# Patient Record
Sex: FEMALE | Race: WHITE | Employment: FULL TIME | URBAN - METROPOLITAN AREA
[De-identification: names, ages, dates, MRNs, and addresses within clinical notes are randomized per-mention and may not be internally consistent; named-entity substitution may affect disease eponyms.]

---

## 2021-07-24 ENCOUNTER — TRANSFERRED RECORDS (OUTPATIENT)
Dept: HEALTH INFORMATION MANAGEMENT | Facility: CLINIC | Age: 41
End: 2021-07-24

## 2021-07-28 ENCOUNTER — DOCUMENTATION ONLY (OUTPATIENT)
Dept: ONCOLOGY | Facility: CLINIC | Age: 41
End: 2021-07-28

## 2021-07-28 ENCOUNTER — PATIENT OUTREACH (OUTPATIENT)
Dept: SURGERY | Facility: CLINIC | Age: 41
End: 2021-07-28

## 2021-07-28 DIAGNOSIS — C16.9 GASTRIC CANCER (H): Primary | ICD-10-CM

## 2021-07-28 NOTE — PROGRESS NOTES
Action    Action Taken 7/28/2021 2:55pm HUEY   I called [facility] [phone] option 4 - requested the CT 7/24/2021 ( image was done at the [facility] in [place]) - they will send the CT soon!     I called [facility] to have an MRI pushed from April Ph: [phone]     I called [facility] to request the MRI IMG - they will send it over soon!    3:14pm HUEY   I resolved IMG in PACS.

## 2021-07-28 NOTE — PROGRESS NOTES
New Patient Oncology Nurse Navigator Note     Referring provider: Dr. Yaneth Oliveira     Referring Clinic/Organization: [facility]      Referred to: Surgical Oncology      Requested provider (if applicable): Dr. Jose Cruz Waters    Referral Received: 07/28/21       Evaluation for : Metastatic gastric cancer      Clinical Assessment / Barriers to Care (Per Nurse):    None at this time.     Records Location:     [facility] Everywhere     Records Needed:     ABDOMINAL IMAGING (CT SCANS, MRI, US)  DATING BACK TO 2021      Additional testing needed prior to consult:     NONE AT THIS TIME    Referral updates and Plan:       Consult with Surgical Oncology   Medical Oncology Consult     07/28/2021 2:43 PM - called and spoke with patients  regarding appointment and plan for consults with medical oncology and surgery. Informed him that we are working on finalized appointments but also need to make sure she is discharged prior to visits as well. Per previous dsicussion patient was to be discharged on Thursday or Friday pending no issues arise prior to that. Informed him I would connect with him by end of the day tomorrow to finalized appointment details.     Called and spoke with patients  and informed him I was able to confirm an appointment with Dr. Hernandez on Friday 7/30 at 2 pm for a video consult. Informed him that we would also continue with plan for consult with Dr. Waters when he returns to the office. Informed him that we would likely need the MRI of the liver to further evaluate. He stated they are scheduled at [facility] next week and have imaging scheduled as well. Informed him we will wait until we see what imaging is completed at [facility] and be sure to get that information prior to the visit with Dr. Waters but proceed with establishing care with oncology ASAP with Dr. Hernandez. Informed him our team would confirm appointment ASAP. He agreed with plan and was very appreciative of information and coordinating of  patients care.     Wanda Osorio, RN, BSN   Surgical Oncology New Patient Nurse Navigator  [facility]  [phone]

## 2021-07-29 NOTE — TELEPHONE ENCOUNTER
RECORDS STATUS - ALL OTHER DIAGNOSIS      RECORDS RECEIVED FROM: [facility]   DATE RECEIVED:    NOTES STATUS DETAILS   OFFICE NOTE from referring provider [facility]    OFFICE NOTE from medical oncologist     DISCHARGE SUMMARY from hospital [facility] 7/24/21   DISCHARGE REPORT from the ER     OPERATIVE REPORT [facility] 7/24/21: Exploratory Laporotomy, EGD w/. Bx of Gastric Mass   MEDICATION LIST [facility]   CLINICAL TRIAL TREATMENTS TO DATE     LABS     PATHOLOGY REPORTS [facility], Report in CE, Slides received 8/3 7/24/21: [id number]   ANYTHING RELATED TO DIAGNOSIS Epic/ 7/29/21   GENONOMIC TESTING     TYPE: Received 8/3    IMAGING (NEED IMAGES & REPORT)     CT SCANS PACS 7/24/21: [facility]   MRI Pacs 4/19/21: [facility]   MAMMO     ULTRASOUND     PET

## 2021-07-30 ENCOUNTER — PATIENT OUTREACH (OUTPATIENT)
Dept: ONCOLOGY | Facility: CLINIC | Age: 41
End: 2021-07-30

## 2021-07-30 ENCOUNTER — VIRTUAL VISIT (OUTPATIENT)
Dept: ONCOLOGY | Facility: CLINIC | Age: 41
End: 2021-07-30
Attending: INTERNAL MEDICINE
Payer: COMMERCIAL

## 2021-07-30 ENCOUNTER — PRE VISIT (OUTPATIENT)
Dept: ONCOLOGY | Facility: CLINIC | Age: 41
End: 2021-07-30

## 2021-07-30 DIAGNOSIS — C78.7 LIVER METASTASES: ICD-10-CM

## 2021-07-30 DIAGNOSIS — C78.6 PERITONEAL METASTASES: ICD-10-CM

## 2021-07-30 DIAGNOSIS — C16.2 MALIGNANT NEOPLASM OF BODY OF STOMACH (H): ICD-10-CM

## 2021-07-30 PROBLEM — K65.0 PERITONITIS, ACUTE GENERALIZED (H): Status: ACTIVE | Noted: 2021-07-24

## 2021-07-30 PROBLEM — K65.0 PERITONITIS, ACUTE GENERALIZED (H): Status: RESOLVED | Noted: 2021-07-24 | Resolved: 2021-07-30

## 2021-07-30 PROCEDURE — 99417 PROLNG OP E/M EACH 15 MIN: CPT | Performed by: INTERNAL MEDICINE

## 2021-07-30 PROCEDURE — 99205 OFFICE O/P NEW HI 60 MIN: CPT | Mod: 95 | Performed by: INTERNAL MEDICINE

## 2021-07-30 NOTE — NURSING NOTE
Patient has medications to add to chart as well as would like to discuss making  a medical proxy.

## 2021-07-30 NOTE — PROGRESS NOTES
Shira is a 41 year old who is being evaluated via a billable video visit.      How would you like to obtain your AVS? MyChart  If the video visit is dropped, the invitation should be resent by: Text to cell phone: [phone]  Will anyone else be joining your video visit? Yes: . How would they like to receive their invitation? Text to cell phone:         Shira Joy is a new patient with newly diagnosed metastatic gastric cancer.    She is a 41-year-old woman who last week had the fairly acute onset of abdominal pain and shortness of breath that led to hospitalization for a perforated stomach.  She underwent an urgent laparotomy for repair of the perforation and was identified to have tumor in the lesser curvature of her stomach with mesenteric nodularity including a small nodule in the left pelvic sidewall which on biopsy proved to be poorly differentiated adenocarcinoma.  She tells me that in retrospect she probably had 2 to 4 weeks of fatigue and vague abdominal discomfort leading up to this, but otherwise has been asymptomatic.  Several months ago on routine physical exam she was identified to have a large pelvic mass and underwent evaluation and this was felt to represent fibroids for which she was scheduled for hysterectomy this fall.  She has recovered quite quickly from her surgery and is back home now.  She is still ramping up her diet but is eating fairly well.    She has a history of a number of cancers in her family.  Her father had a melanoma diagnosed at about the age of 70.  Her paternal grandmother had colon cancer in her mid 70s.  On her mother's side of her family her grandmother had breast cancer at about the age of 45.  3 maternal aunts and 1 maternal uncle all had lung cancer-all of them were heavy smokers.  She is unaware of any other cancers in more distant relatives.    Ms. Joy is  and lives in [place] with her  who accompanies her on today's visit.  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  She has a 19-year-old stepson and a 10 and 8-year-old with her .  She is mostly a non-smoker having smoked only a little bit, quitting more than 20 years ago.    GENERAL: Healthy, alert and no distress  EYES: Eyes grossly normal to inspection.  No discharge or erythema, or obvious scleral/conjunctival abnormalities.  RESP: No audible wheeze, cough, or visible cyanosis.  No visible retractions or increased work of breathing.    SKIN: Visible skin clear. No significant rash, abnormal pigmentation or lesions.  NEURO: Cranial nerves grossly intact.  Mentation and speech appropriate for age.  PSYCH: Mentation appears normal, affect normal/bright, judgement and insight intact, normal speech and appearance well-groomed.    I personally reviewed her outside CT scan done preoperatively and reviewed the images with the patient and her .  In addition to the ascites and free air she has marked thickening of the stomach wall and mesenteric nodularity.  There is a 2 cm lesion in the right lobe of her liver consistent with metastases.  She has large complex mass(es) associated with her uterus.  I do not see any abnormality in the visualized lung bases.  Her outside lab work shows a CEA level of 205.  Her labs at discharge show mild normocytic anemia and otherwise normal blood counts.  Her electrolytes and renal function were normal.  She had a depressed albumin and normal liver enzymes.    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  The bulky disease in her pelvis may well be uterine fibroids rather than metastatic disease, and if so she otherwise has relatively minimal peritoneal disease.  I'm fairly sure the liver lesion represents a metastasis but will need a little further evaluation to clarify that.    I had a long discussion with the patient and her   discussing the status of her disease, her prognosis and treatment options I explained the need to complete her staging to clarify the extent of her disease.  She clearly has stage IV disease but I would like PET scanning to clarify the nature of her liver lesion and completion CT to clarify the status of the rest of her lungs.  I explained that regardless she has disease that I would consider incurable.  If she truly has only small volume peritoneal disease and no other metastatic disease it would be within the realm of reasonable choices to resect her stomach and peritoneal disease with HIPEC, but I told her I did not think that was probably going to be the case.    We spent most of our time discussing the need for systemic therapy to control her disease with the intent to maintain her quality of life and prolong her survival.  She wanted to know about her prognosis and I explained that without therapy the expectation is time measured in months with few people making it past a year.  I explained that with our best therapies we can extend the average survival out past a year with some doing better for much longer but also with some not having much of a response to treatment.  She still has molecular profiling pending on her tumor which will help guide our treatment choices and so we discussed only in general terms what some of the options might be.  We discussed the overall strategy of starting systemic therapy, making adjustments for side effects, and then continuing therapy so long as it was effective without excess toxicity, with evaluations every couple of months for response.  We discussed that there would likely be several different lines of therapy that would be appropriate for her.    We discussed in general terms the availability of clinical trials, and I explained I do not currently have an open frontline treatment trial would be appropriate for her, but that we would certainly have some trial  opportunities with subsequent lines of therapy.  We talked about her young age and the possibility of a familial cancer syndrome and I recommended genetic counseling and testing.    At the end of our long discussion she and her  had all their immediate questions answered.  She still needs some recovery time from her recent acute event and surgery before we get started on treatment.  She has a visit for another opinion down at [facility] next week and they have already scheduled her for PET scanning, which is the only additional testing that I think we need (along with the pending molecular profile.)  We will plan on further discussion in about 10 days or so once we have the results of the additional testing and she has had a visit with surgical oncology.    Total time by me today inclusive of the above visit, review of her outside imaging and records, coordination of care and documentation was approximately 90 minutes.

## 2021-07-30 NOTE — PROGRESS NOTES
RN Care Coordination Note  Placed call to patient and . Introduced self and role of RN Care Coordinator at [facility]. Provided my contact information, [facility] phone number (which has options to talk with a Nurse available 24/7 - triage and RNCC via this option during business hours).     Reviewed current adjustments in clinic flow due to Covid-19 pandemic including addition of telemedicine visits, visitor restrictions, RNCCs working remotely at varying intervals, and Covid testing.    Reviewed [facility] care team members including midlevel providers in oncology dept and Dr. Hernandez's usual clinic hours.    Patient questions if she can/should have COVID-19 vaccine. Discussed we do recommend patients be vaccinated. Reviewed infection prevention strategies.     Pt voiced understanding and appreciation of above information and denies any further questions.         Norma Connell RN, BSN, OCN   RN Care Coordinator   [facility]

## 2021-07-30 NOTE — LETTER
7/30/2021         RE: Shira Joy  [address]  [place] [address]        Dear Colleague,    Thank you for referring your patient, Shira Joy, to the [facility]. Please see a copy of my visit note below.    Shira is a 41 year old who is being evaluated via a billable video visit.      How would you like to obtain your AVS? MyChart  If the video visit is dropped, the invitation should be resent by: Text to cell phone: [phone]  Will anyone else be joining your video visit? Yes: . How would they like to receive their invitation? Text to cell phone:         Shira Joy is a new patient with newly diagnosed metastatic gastric cancer.    She is a 41-year-old woman who last week had the fairly acute onset of abdominal pain and shortness of breath that led to hospitalization for a perforated stomach.  She underwent an urgent laparotomy for repair of the perforation and was identified to have tumor in the lesser curvature of her stomach with mesenteric nodularity including a small nodule in the left pelvic sidewall which on biopsy proved to be poorly differentiated adenocarcinoma.  She tells me that in retrospect she probably had 2 to 4 weeks of fatigue and vague abdominal discomfort leading up to this, but otherwise has been asymptomatic.  Several months ago on routine physical exam she was identified to have a large pelvic mass and undergone evaluation and this was felt to represent fibroids for which she was scheduled for hysterectomy this fall.  She has recovered quite quickly from her surgery and is back home now.  She is still ramping up her diet but is eating fairly well.    She has a history of a number of cancers in her family.  Her father had a melanoma diagnosed at about the age of 70.  Her paternal grandmother had colon cancer in her mid 70s.  On her mother's side of her family her grandmother had breast cancer at about the age of 45.  3 maternal aunts  and 1 maternal uncle all had lung cancer-all of them were heavy smokers.  She is unaware of any other cancers in more distant relatives.    Ms. Joy is  and lives in [place] with her  who accompanies her on today's visit.  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  She has a 19-year-old stepson and a 10 and 8-year-old with her .  She is mostly a non-smoker having smoked only a little bit, quitting more than 20 years ago.    GENERAL: Healthy, alert and no distress  EYES: Eyes grossly normal to inspection.  No discharge or erythema, or obvious scleral/conjunctival abnormalities.  RESP: No audible wheeze, cough, or visible cyanosis.  No visible retractions or increased work of breathing.    SKIN: Visible skin clear. No significant rash, abnormal pigmentation or lesions.  NEURO: Cranial nerves grossly intact.  Mentation and speech appropriate for age.  PSYCH: Mentation appears normal, affect normal/bright, judgement and insight intact, normal speech and appearance well-groomed.    I personally her outside CT scan done preoperatively and reviewed the images with the patient and her .  In addition to the ascites and free air she has marked thickening of the stomach wall and mesenteric nodularity.  There is a 2 cm lesion in the right lobe of her liver consistent with metastases.  She has large complex mass(es) associated with her uterus.  I do not see any abnormality in the visualized lung bases.  Her outside lab work shows a CEA level of 205.  Her labs at discharge show mild normocytic anemia and otherwise normal blood counts.  Her electrolytes and renal function were normal.  She had a depressed albumin and normal liver enzymes.    Assessment/plan: Adenocarcinoma the stomach with peritoneal and probably liver metastases in a young woman with a normal performance status.  The bulky disease in her pelvis may well be uterine fibroids rather than metastatic disease,  and if so she otherwise has relatively minimal peritoneal disease.  In fairly sure the liver lesion represents a metastasis but will need a little further evaluation to clarify that.    I had a long discussion with the patient and her  discussing the status of her disease her prognosis and treatment options I explained the need to complete her staging to clarify the extent of her disease.  She clearly has stage IV disease there would like PET scanning to clarify the nature of her liver lesion and completion CT to clarify the status of the rest of her lungs.  I explained that regardless she has disease that I would consider incurable.  If she truly has only small volume peritoneal disease and no other metastatic disease it would be within the realm of reasonable choices to resect her stomach and peritoneal disease with HIPEC, but I told her I did not think that was probably going to be the case.    We spent most of our time discussing the need for systemic therapy to control her disease with the intent to maintain her quality of life and prolong her survival.  She wanted to know about her prognosis and I explained that without therapy the expectation is time measured in months with few people making it past a year.  I explained that with her best therapies we can extend the average survival out past a year with some doing better for much longer but also with some not having much of a response to treatment.  She still has molecular profiling pending on her tumor which will help guide her treatment choices and so we discussed only in general terms what some of the options might be.  We discussed the overall strategy of starting systemic therapy, making adjustments for side effects, and then continuing therapy so long as it was effective without excess toxicity with evaluations every couple of months for response.  We discussed that there would likely be several different lines of therapy that would be  appropriate for her.    We discussed in general terms the availability of clinical trials, and I explained I do not currently have an open frontline treatment trial would be appropriate for but that we would certainly have some trial opportunities with subsequent lines of therapy.  We talked about her young age and the possibility of a familial cancer syndrome and I recommended genetic counseling and testing.    At the end of our long discussion she and her  had all their immediate questions answered.  She still need some recovery time from her recent acute event and surgery before we get started on treatment.  She has a visit for another opinion done at [facility] next week and they have already scheduled her for PET scanning, which is the only additional testing that I think we need (along with the pending molecular profile.)  We will plan on further discussion in about 10 days or so once we have the results of the additional testing and she has had a visit with surgical oncology.    Total time by me today inclusive of the above visit, review of her outside imaging and records, coordination of care and documentation was approximately 90 minutes.      Again, thank you for allowing me to participate in the care of your patient.        Sincerely,        Kevin Hernandez MD

## 2021-08-05 ENCOUNTER — LAB (OUTPATIENT)
Dept: LAB | Facility: CLINIC | Age: 41
End: 2021-08-05
Payer: COMMERCIAL

## 2021-08-05 ENCOUNTER — TRANSFERRED RECORDS (OUTPATIENT)
Dept: HEALTH INFORMATION MANAGEMENT | Facility: CLINIC | Age: 41
End: 2021-08-05

## 2021-08-05 DIAGNOSIS — C16.9 GASTRIC CANCER (H): Primary | ICD-10-CM

## 2021-08-05 LAB
PATH REPORT.COMMENTS IMP SPEC: ABNORMAL
PATH REPORT.COMMENTS IMP SPEC: YES
PATH REPORT.FINAL DX SPEC: ABNORMAL
PATH REPORT.GROSS SPEC: ABNORMAL
PATH REPORT.MICROSCOPIC SPEC OTHER STN: ABNORMAL
PATH REPORT.RELEVANT HX SPEC: ABNORMAL
PATH REPORT.RELEVANT HX SPEC: ABNORMAL
PATH REPORT.SITE OF ORIGIN SPEC: ABNORMAL

## 2021-08-05 PROCEDURE — 88321 CONSLTJ&REPRT SLD PREP ELSWR: CPT | Performed by: PATHOLOGY

## 2021-08-13 ENCOUNTER — ONCOLOGY VISIT (OUTPATIENT)
Dept: ONCOLOGY | Facility: CLINIC | Age: 41
End: 2021-08-13
Attending: SURGERY
Payer: COMMERCIAL

## 2021-08-13 VITALS
WEIGHT: 102.8 LBS | HEIGHT: 63 IN | BODY MASS INDEX: 18.21 KG/M2 | DIASTOLIC BLOOD PRESSURE: 81 MMHG | RESPIRATION RATE: 16 BRPM | OXYGEN SATURATION: 98 % | SYSTOLIC BLOOD PRESSURE: 113 MMHG | TEMPERATURE: 97.5 F | HEART RATE: 84 BPM

## 2021-08-13 DIAGNOSIS — C16.9 GASTRIC CANCER (H): ICD-10-CM

## 2021-08-13 PROCEDURE — 99205 OFFICE O/P NEW HI 60 MIN: CPT | Performed by: SURGERY

## 2021-08-13 PROCEDURE — G0463 HOSPITAL OUTPT CLINIC VISIT: HCPCS

## 2021-08-13 RX ORDER — OXYCODONE HYDROCHLORIDE 5 MG/1
TABLET ORAL
Status: ON HOLD | COMMUNITY
Start: 2021-08-06 | End: 2021-12-07

## 2021-08-13 RX ORDER — ONDANSETRON 4 MG/1
TABLET, ORALLY DISINTEGRATING ORAL
Status: ON HOLD | COMMUNITY
Start: 2021-07-29 | End: 2021-12-07

## 2021-08-13 RX ORDER — ACETAMINOPHEN 500 MG
1000 TABLET ORAL EVERY 6 HOURS PRN
Status: ON HOLD | COMMUNITY
Start: 2021-07-29 | End: 2021-12-18

## 2021-08-13 RX ORDER — PANTOPRAZOLE SODIUM 40 MG/1
40 TABLET, DELAYED RELEASE ORAL DAILY
Status: ON HOLD | COMMUNITY
Start: 2021-07-29 | End: 2021-12-18

## 2021-08-13 ASSESSMENT — PAIN SCALES - GENERAL: PAINLEVEL: NO PAIN (0)

## 2021-08-13 ASSESSMENT — MIFFLIN-ST. JEOR: SCORE: 1114.04

## 2021-08-13 NOTE — LETTER
8/13/2021         RE: Shira Joy  [address]  [place] [address]        Dear Colleague,    Thank you for referring your patient, Shira Joy, to the [facility]. Please see a copy of my visit note below.    HISTORY OF PRESENT ILLNESS:  Today, I had an in-person visit with Shira Joy.  I was asked to see her at the request of Dr. Yaneth Oliveira for evaluation of stage IV gastric cancer.  The patient was in her usual state of good health until she developed severe abdominal pain and free air.  She was taken to surgery in [place] where she was found to have a perforation in her stomach with advanced gastric cancer.  She had a nodule along her gastrocolic ligament that was removed.  She had a lesion in her right pericolic gutter that was removed and another omental nodule that was removed.  These were all positive for poorly differentiated adenocarcinoma.  She had a Frank patch closure of her stomach and then had endoscopy, which demonstrated a mass in her lesser curvature, which was also positive for metastatic disease.  She had a PET/CT scan, which demonstrated a thickened stomach, particularly on the lesser curvature aspect and evidence of peritoneal nodules.  She had a 1.8 cm right liver mass that was consistent with metastasis and a 6 cm right ovarian mass that was also consistent with metastasis.  She has recovered well from her surgery.  She is eating better, has more energy and appetite.    PAST MEDICAL HISTORY:  Significant for no other major medical problems.    FAMILY HISTORY:  Significant for a maternal uncle who had gastric cancer and a maternal grandmother who had breast cancer.  She is undergoing genetic testing evaluation at [facility].    IMPRESSION:  Stage IV gastric cancer.    PLAN:  I talked to her about the various treatment options.  I told her that surgery is not indicated now and probably never.  I have recommended that she proceed with  systemic chemotherapy.  She has a couple of other opinions.  She has another pending at [facility] and another pending at [facility].  She has seen Dr. Bhupinder Hernandez here in Medical Oncology as well.  If she requires any palliative surgical procedures in the future I will be glad to take care of her, but there is no role for cancer directed therapy for her situation.    Total time 60 minutes, which included reviewing her imaging and then an in-person consultation with her and her .          Again, thank you for allowing me to participate in the care of your patient.        Sincerely,        Jose Cruz Waters MD

## 2021-08-13 NOTE — NURSING NOTE
"Oncology Rooming Note    August 13, 2021 11:24 AM   Shira Joy is a 41 year old female who presents for:    Chief Complaint   Patient presents with     Oncology Clinic Visit     GASTRIC CANCER     Initial Vitals: /81 (BP Location: Right arm, Patient Position: Sitting, Cuff Size: Adult Regular)   Pulse 84   Temp 97.5  F (36.4  C) (Oral)   Resp 16   Ht 1.606 m (5' 3.23\")   Wt 46.6 kg (102 lb 12.8 oz)   SpO2 98%   BMI 18.08 kg/m   Estimated body mass index is 18.08 kg/m  as calculated from the following:    Height as of this encounter: 1.606 m (5' 3.23\").    Weight as of this encounter: 46.6 kg (102 lb 12.8 oz). Body surface area is 1.44 meters squared.  No Pain (0) Comment: Data Unavailable   No LMP recorded.  Allergies reviewed: Yes  Medications reviewed: Yes    Medications: Medication refills not needed today.  Pharmacy name entered into [employer]: [facility] #[id number] - [place], [place] - [address] AT [address]     Clinical concerns: Intermittent sharp abdominal pain.       Wendy Mcgovern [facility]              "

## 2021-08-13 NOTE — PROGRESS NOTES
HISTORY OF PRESENT ILLNESS:  Today, I had an in-person visit with Shira Joy.  I was asked to see her at the request of Dr. Yaneth Oliveira for evaluation of stage IV gastric cancer.  The patient was in her usual state of good health until she developed severe abdominal pain and free air.  She was taken to surgery in [place] where she was found to have a perforation in her stomach with advanced gastric cancer.  She had a nodule along her gastrocolic ligament that was removed.  She had a lesion in her right pericolic gutter that was removed and another omental nodule that was removed.  These were all positive for poorly differentiated adenocarcinoma.  She had a Frank patch closure of her stomach and then had endoscopy, which demonstrated a mass in her lesser curvature, which was also positive for metastatic disease.  She had a PET/CT scan, which demonstrated a thickened stomach, particularly on the lesser curvature aspect and evidence of peritoneal nodules.  She had a 1.8 cm right liver mass that was consistent with metastasis and a 6 cm right ovarian mass that was also consistent with metastasis.  She has recovered well from her surgery.  She is eating better, has more energy and appetite.    PAST MEDICAL HISTORY:  Significant for no other major medical problems.    FAMILY HISTORY:  Significant for a maternal uncle who had gastric cancer and a maternal grandmother who had breast cancer.  She is undergoing genetic testing evaluation at [facility].    IMPRESSION:  Stage IV gastric cancer.    PLAN:  I talked to her about the various treatment options.  I told her that surgery is not indicated now and probably never.  I have recommended that she proceed with systemic chemotherapy.  She has a couple of other opinions.  She has another pending at [facility] and another pending at [facility].  She has seen Dr. Bhupinder Hernandez here in [facility] as well.  If she requires any palliative surgical procedures in the  future I will be glad to take care of her, but there is no role for cancer directed therapy for her situation.    Total time 60 minutes, which included reviewing her imaging and then an in-person consultation with her and her .

## 2021-08-22 ENCOUNTER — HEALTH MAINTENANCE LETTER (OUTPATIENT)
Age: 41
End: 2021-08-22

## 2021-10-14 ENCOUNTER — TRANSFERRED RECORDS (OUTPATIENT)
Dept: HEALTH INFORMATION MANAGEMENT | Facility: CLINIC | Age: 41
End: 2021-10-14
Payer: COMMERCIAL

## 2021-10-17 ENCOUNTER — HEALTH MAINTENANCE LETTER (OUTPATIENT)
Age: 41
End: 2021-10-17

## 2021-12-06 ENCOUNTER — APPOINTMENT (OUTPATIENT)
Dept: GENERAL RADIOLOGY | Facility: CLINIC | Age: 41
End: 2021-12-06
Attending: EMERGENCY MEDICINE
Payer: COMMERCIAL

## 2021-12-06 ENCOUNTER — HOSPITAL ENCOUNTER (INPATIENT)
Facility: CLINIC | Age: 41
LOS: 3 days | Discharge: HOME | End: 2021-12-09
Attending: INTERNAL MEDICINE | Admitting: HOSPITALIST
Payer: COMMERCIAL

## 2021-12-06 ENCOUNTER — APPOINTMENT (OUTPATIENT)
Dept: CT IMAGING | Facility: CLINIC | Age: 41
End: 2021-12-06
Attending: INTERNAL MEDICINE
Payer: COMMERCIAL

## 2021-12-06 DIAGNOSIS — C78.6 SECONDARY MALIGNANT NEOPLASM OF RETROPERITONEUM AND PERITONEUM (H): ICD-10-CM

## 2021-12-06 DIAGNOSIS — C16.9 MALIGNANT NEOPLASM OF STOMACH, UNSPECIFIED LOCATION (H): ICD-10-CM

## 2021-12-06 DIAGNOSIS — E08.9 DIABETES MELLITUS DUE TO UNDERLYING CONDITION WITHOUT COMPLICATION, UNSPECIFIED WHETHER LONG TERM INSULIN USE (H): Primary | ICD-10-CM

## 2021-12-06 DIAGNOSIS — K56.609 SBO (SMALL BOWEL OBSTRUCTION) (H): ICD-10-CM

## 2021-12-06 DIAGNOSIS — C78.6 PERITONEAL CARCINOMATOSIS (H): ICD-10-CM

## 2021-12-06 DIAGNOSIS — K56.609 SMALL BOWEL OBSTRUCTION (H): ICD-10-CM

## 2021-12-06 DIAGNOSIS — Z20.822 CONTACT WITH AND (SUSPECTED) EXPOSURE TO COVID-19: ICD-10-CM

## 2021-12-06 DIAGNOSIS — C78.7 SECONDARY MALIGNANT NEOPLASM OF LIVER (H): ICD-10-CM

## 2021-12-06 DIAGNOSIS — C79.60 MALIGNANT NEOPLASM METASTATIC TO OVARY, UNSPECIFIED LATERALITY (H): ICD-10-CM

## 2021-12-06 DIAGNOSIS — R10.84 ABDOMINAL PAIN, GENERALIZED: ICD-10-CM

## 2021-12-06 DIAGNOSIS — C78.7 LIVER METASTASES: ICD-10-CM

## 2021-12-06 LAB
ABO/RH(D): NORMAL
ALBUMIN SERPL-MCNC: 3 G/DL (ref 3.4–5)
ALP SERPL-CCNC: 84 U/L (ref 40–150)
ALT SERPL W P-5'-P-CCNC: 65 U/L (ref 0–50)
ANION GAP SERPL CALCULATED.3IONS-SCNC: 8 MMOL/L (ref 3–14)
ANTIBODY SCREEN: NEGATIVE
AST SERPL W P-5'-P-CCNC: 28 U/L (ref 0–45)
BASOPHILS # BLD AUTO: 0 10E3/UL (ref 0–0.2)
BASOPHILS NFR BLD AUTO: 0 %
BILIRUB SERPL-MCNC: 0.3 MG/DL (ref 0.2–1.3)
BUN SERPL-MCNC: 11 MG/DL (ref 7–30)
CALCIUM SERPL-MCNC: 8.9 MG/DL (ref 8.5–10.1)
CHLORIDE BLD-SCNC: 102 MMOL/L (ref 94–109)
CO2 SERPL-SCNC: 26 MMOL/L (ref 20–32)
CREAT SERPL-MCNC: 0.47 MG/DL (ref 0.52–1.04)
EOSINOPHIL # BLD AUTO: 0.1 10E3/UL (ref 0–0.7)
EOSINOPHIL NFR BLD AUTO: 1 %
ERYTHROCYTE [DISTWIDTH] IN BLOOD BY AUTOMATED COUNT: 14.6 % (ref 10–15)
GFR SERPL CREATININE-BSD FRML MDRD: >90 ML/MIN/1.73M2
GLUCOSE BLD-MCNC: 167 MG/DL (ref 70–99)
HCO3 BLDV-SCNC: 25 MMOL/L (ref 21–28)
HCT VFR BLD AUTO: 32.5 % (ref 35–47)
HGB BLD-MCNC: 10.7 G/DL (ref 11.7–15.7)
HOLD SPECIMEN: NORMAL
IMM GRANULOCYTES # BLD: 0.1 10E3/UL
IMM GRANULOCYTES NFR BLD: 1 %
INR PPP: 1.04 (ref 0.85–1.15)
LACTATE BLD-SCNC: 1.3 MMOL/L
LIPASE SERPL-CCNC: 113 U/L (ref 73–393)
LYMPHOCYTES # BLD AUTO: 1.1 10E3/UL (ref 0.8–5.3)
LYMPHOCYTES NFR BLD AUTO: 13 %
MCH RBC QN AUTO: 29.3 PG (ref 26.5–33)
MCHC RBC AUTO-ENTMCNC: 32.9 G/DL (ref 31.5–36.5)
MCV RBC AUTO: 89 FL (ref 78–100)
MONOCYTES # BLD AUTO: 0.3 10E3/UL (ref 0–1.3)
MONOCYTES NFR BLD AUTO: 3 %
NEUTROPHILS # BLD AUTO: 7.1 10E3/UL (ref 1.6–8.3)
NEUTROPHILS NFR BLD AUTO: 82 %
NRBC # BLD AUTO: 0 10E3/UL
NRBC BLD AUTO-RTO: 0 /100
PCO2 BLDV: 37 MM HG (ref 40–50)
PH BLDV: 7.44 [PH] (ref 7.32–7.43)
PLATELET # BLD AUTO: 304 10E3/UL (ref 150–450)
PO2 BLDV: 34 MM HG (ref 25–47)
POTASSIUM BLD-SCNC: 3.6 MMOL/L (ref 3.4–5.3)
PROT SERPL-MCNC: 6.7 G/DL (ref 6.8–8.8)
RADIOLOGIST FLAGS: ABNORMAL
RBC # BLD AUTO: 3.65 10E6/UL (ref 3.8–5.2)
SAO2 % BLDV: 68 % (ref 94–100)
SARS-COV-2 RNA RESP QL NAA+PROBE: NEGATIVE
SODIUM SERPL-SCNC: 136 MMOL/L (ref 133–144)
SPECIMEN EXPIRATION DATE: NORMAL
WBC # BLD AUTO: 8.7 10E3/UL (ref 4–11)

## 2021-12-06 PROCEDURE — 250N000011 HC RX IP 250 OP 636: Performed by: INTERNAL MEDICINE

## 2021-12-06 PROCEDURE — 36415 COLL VENOUS BLD VENIPUNCTURE: CPT | Performed by: INTERNAL MEDICINE

## 2021-12-06 PROCEDURE — 74177 CT ABD & PELVIS W/CONTRAST: CPT

## 2021-12-06 PROCEDURE — 74177 CT ABD & PELVIS W/CONTRAST: CPT | Mod: 26 | Performed by: RADIOLOGY

## 2021-12-06 PROCEDURE — 86900 BLOOD TYPING SEROLOGIC ABO: CPT | Performed by: INTERNAL MEDICINE

## 2021-12-06 PROCEDURE — 250N000009 HC RX 250: Performed by: INTERNAL MEDICINE

## 2021-12-06 PROCEDURE — 74018 RADEX ABDOMEN 1 VIEW: CPT | Mod: 26 | Performed by: RADIOLOGY

## 2021-12-06 PROCEDURE — 96375 TX/PRO/DX INJ NEW DRUG ADDON: CPT | Performed by: INTERNAL MEDICINE

## 2021-12-06 PROCEDURE — 83690 ASSAY OF LIPASE: CPT | Performed by: EMERGENCY MEDICINE

## 2021-12-06 PROCEDURE — 85610 PROTHROMBIN TIME: CPT | Performed by: INTERNAL MEDICINE

## 2021-12-06 PROCEDURE — 96376 TX/PRO/DX INJ SAME DRUG ADON: CPT | Performed by: INTERNAL MEDICINE

## 2021-12-06 PROCEDURE — 96374 THER/PROPH/DIAG INJ IV PUSH: CPT | Mod: 59 | Performed by: INTERNAL MEDICINE

## 2021-12-06 PROCEDURE — 258N000003 HC RX IP 258 OP 636: Performed by: INTERNAL MEDICINE

## 2021-12-06 PROCEDURE — 93005 ELECTROCARDIOGRAM TRACING: CPT | Performed by: INTERNAL MEDICINE

## 2021-12-06 PROCEDURE — 250N000011 HC RX IP 250 OP 636: Performed by: EMERGENCY MEDICINE

## 2021-12-06 PROCEDURE — 258N000003 HC RX IP 258 OP 636: Performed by: HOSPITALIST

## 2021-12-06 PROCEDURE — 96361 HYDRATE IV INFUSION ADD-ON: CPT | Performed by: INTERNAL MEDICINE

## 2021-12-06 PROCEDURE — 99285 EMERGENCY DEPT VISIT HI MDM: CPT | Performed by: INTERNAL MEDICINE

## 2021-12-06 PROCEDURE — 99223 1ST HOSP IP/OBS HIGH 75: CPT | Mod: AI | Performed by: HOSPITALIST

## 2021-12-06 PROCEDURE — 99285 EMERGENCY DEPT VISIT HI MDM: CPT | Mod: 25 | Performed by: INTERNAL MEDICINE

## 2021-12-06 PROCEDURE — 120N000002 HC R&B MED SURG/OB UMMC

## 2021-12-06 PROCEDURE — 99221 1ST HOSP IP/OBS SF/LOW 40: CPT | Performed by: SURGERY

## 2021-12-06 PROCEDURE — 85025 COMPLETE CBC W/AUTO DIFF WBC: CPT | Performed by: INTERNAL MEDICINE

## 2021-12-06 PROCEDURE — 999N000065 XR ABDOMEN PORT 1 VIEWS

## 2021-12-06 PROCEDURE — U0003 INFECTIOUS AGENT DETECTION BY NUCLEIC ACID (DNA OR RNA); SEVERE ACUTE RESPIRATORY SYNDROME CORONAVIRUS 2 (SARS-COV-2) (CORONAVIRUS DISEASE [COVID-19]), AMPLIFIED PROBE TECHNIQUE, MAKING USE OF HIGH THROUGHPUT TECHNOLOGIES AS DESCRIBED BY CMS-2020-01-R: HCPCS | Performed by: EMERGENCY MEDICINE

## 2021-12-06 PROCEDURE — 82803 BLOOD GASES ANY COMBINATION: CPT

## 2021-12-06 PROCEDURE — 80053 COMPREHEN METABOLIC PANEL: CPT | Performed by: INTERNAL MEDICINE

## 2021-12-06 RX ORDER — SODIUM CHLORIDE 9 MG/ML
INJECTION, SOLUTION INTRAVENOUS CONTINUOUS
Status: DISCONTINUED | OUTPATIENT
Start: 2021-12-06 | End: 2021-12-06

## 2021-12-06 RX ORDER — ONDANSETRON 2 MG/ML
4 INJECTION INTRAMUSCULAR; INTRAVENOUS ONCE
Status: COMPLETED | OUTPATIENT
Start: 2021-12-06 | End: 2021-12-06

## 2021-12-06 RX ORDER — MORPHINE SULFATE 4 MG/ML
4 INJECTION, SOLUTION INTRAMUSCULAR; INTRAVENOUS ONCE
Status: COMPLETED | OUTPATIENT
Start: 2021-12-06 | End: 2021-12-06

## 2021-12-06 RX ORDER — HYDROMORPHONE HYDROCHLORIDE 1 MG/ML
0.5 INJECTION, SOLUTION INTRAMUSCULAR; INTRAVENOUS; SUBCUTANEOUS ONCE
Status: COMPLETED | OUTPATIENT
Start: 2021-12-06 | End: 2021-12-06

## 2021-12-06 RX ORDER — HYDROMORPHONE HYDROCHLORIDE 1 MG/ML
0.5 INJECTION, SOLUTION INTRAMUSCULAR; INTRAVENOUS; SUBCUTANEOUS
Status: DISCONTINUED | OUTPATIENT
Start: 2021-12-06 | End: 2021-12-09 | Stop reason: HOSPADM

## 2021-12-06 RX ORDER — SODIUM CHLORIDE, SODIUM LACTATE, POTASSIUM CHLORIDE, CALCIUM CHLORIDE 600; 310; 30; 20 MG/100ML; MG/100ML; MG/100ML; MG/100ML
INJECTION, SOLUTION INTRAVENOUS CONTINUOUS
Status: DISCONTINUED | OUTPATIENT
Start: 2021-12-06 | End: 2021-12-09 | Stop reason: HOSPADM

## 2021-12-06 RX ORDER — METOCLOPRAMIDE HYDROCHLORIDE 5 MG/ML
5 INJECTION INTRAMUSCULAR; INTRAVENOUS ONCE
Status: DISCONTINUED | OUTPATIENT
Start: 2021-12-06 | End: 2021-12-06

## 2021-12-06 RX ORDER — IOPAMIDOL 755 MG/ML
57 INJECTION, SOLUTION INTRAVASCULAR ONCE
Status: COMPLETED | OUTPATIENT
Start: 2021-12-06 | End: 2021-12-06

## 2021-12-06 RX ORDER — MORPHINE SULFATE 4 MG/ML
4 INJECTION, SOLUTION INTRAMUSCULAR; INTRAVENOUS
Status: COMPLETED | OUTPATIENT
Start: 2021-12-06 | End: 2021-12-07

## 2021-12-06 RX ADMIN — SODIUM CHLORIDE: 900 INJECTION, SOLUTION INTRAVENOUS at 16:05

## 2021-12-06 RX ADMIN — HYDROMORPHONE HYDROCHLORIDE 0.5 MG: 1 INJECTION, SOLUTION INTRAMUSCULAR; INTRAVENOUS; SUBCUTANEOUS at 16:50

## 2021-12-06 RX ADMIN — SODIUM CHLORIDE, POTASSIUM CHLORIDE, SODIUM LACTATE AND CALCIUM CHLORIDE: 600; 310; 30; 20 INJECTION, SOLUTION INTRAVENOUS at 23:34

## 2021-12-06 RX ADMIN — SODIUM CHLORIDE, PRESERVATIVE FREE 64 ML: 5 INJECTION INTRAVENOUS at 19:27

## 2021-12-06 RX ADMIN — SODIUM CHLORIDE 1000 ML: 900 INJECTION, SOLUTION INTRAVENOUS at 15:05

## 2021-12-06 RX ADMIN — MORPHINE SULFATE 4 MG: 4 INJECTION INTRAVENOUS at 21:56

## 2021-12-06 RX ADMIN — ONDANSETRON 4 MG: 2 INJECTION INTRAMUSCULAR; INTRAVENOUS at 16:55

## 2021-12-06 RX ADMIN — ONDANSETRON 4 MG: 2 INJECTION INTRAMUSCULAR; INTRAVENOUS at 19:16

## 2021-12-06 RX ADMIN — HYDROMORPHONE HYDROCHLORIDE 0.5 MG: 1 INJECTION, SOLUTION INTRAMUSCULAR; INTRAVENOUS; SUBCUTANEOUS at 17:59

## 2021-12-06 RX ADMIN — PROCHLORPERAZINE EDISYLATE 5 MG: 5 INJECTION INTRAMUSCULAR; INTRAVENOUS at 23:34

## 2021-12-06 RX ADMIN — MORPHINE SULFATE 4 MG: 4 INJECTION, SOLUTION INTRAMUSCULAR; INTRAVENOUS at 20:01

## 2021-12-06 RX ADMIN — HYDROMORPHONE HYDROCHLORIDE 0.5 MG: 1 INJECTION, SOLUTION INTRAMUSCULAR; INTRAVENOUS; SUBCUTANEOUS at 15:37

## 2021-12-06 RX ADMIN — IOPAMIDOL 57 ML: 755 INJECTION, SOLUTION INTRAVENOUS at 19:27

## 2021-12-06 ASSESSMENT — ENCOUNTER SYMPTOMS
ABDOMINAL DISTENTION: 1
SHORTNESS OF BREATH: 0
EYE REDNESS: 0
HEADACHES: 0
ABDOMINAL PAIN: 1
NECK STIFFNESS: 0
CONFUSION: 0
FEVER: 0
COLOR CHANGE: 0
DIFFICULTY URINATING: 0
ARTHRALGIAS: 0

## 2021-12-06 ASSESSMENT — MIFFLIN-ST. JEOR: SCORE: 1065.98

## 2021-12-06 ASSESSMENT — ACTIVITIES OF DAILY LIVING (ADL)
ADLS_ACUITY_SCORE: 7
ADLS_ACUITY_SCORE: 7

## 2021-12-06 NOTE — ED PROVIDER NOTES
ED Provider Note  [facility]      History     Chief Complaint   Patient presents with     Abdominal Pain     The history is provided by the patient, medical records and the spouse.     Shira Joy is a 41 year old female with history of metastatic gastric adenocarcinoma with liver and ovarian mets and peritoneal carcinomatosis and newly diagnosed autoimmune DMI d/t Keytruda presenting to the ED with complaint of abdominal pain. Patient reports new and gradually worsening abdominal pain in the last week. Her last chemo treatment was 1 week ago today. She has never had pain like this after treatment. She describes it as a constant, throbbing pain. It is diffuse, but worse on the left side. She reports that her abdomen becomes hard and warm, especially after eating. Her  notes that it becomes visibly distended. BMs do not relieve pain. She had blood in her stool last night. She had a normal, non-bloody BM this morning. No fevers. She notes that she took 5 units of insulin prior to eating today and was unable to eat much, so her BG is likely low. She was just diagnosed with DM on Thanksgiving.      Past Medical History  No past medical history on file.  No past surgical history on file.  acetaminophen 500 MG CAPS  ondansetron (ZOFRAN-ODT) 4 MG ODT tab  oxyCODONE (ROXICODONE) 5 MG tablet  pantoprazole (PROTONIX) 40 MG EC tablet      No Known Allergies  Family History  No family history on file.  Social History   Social History     Tobacco Use     Smoking status: Never Smoker     Smokeless tobacco: Never Used   Substance Use Topics     Alcohol use: Not on file     Drug use: Not on file      Past medical history, past surgical history, medications, allergies, family history, and social history were reviewed with the patient. No additional pertinent items.       Review of Systems   Constitutional: Negative for fever.   HENT: Negative for congestion.    Eyes: Negative for redness.  "  Respiratory: Negative for shortness of breath.    Cardiovascular: Negative for chest pain.   Gastrointestinal: Positive for abdominal distention and abdominal pain.   Genitourinary: Negative for difficulty urinating.   Musculoskeletal: Negative for arthralgias and neck stiffness.   Skin: Negative for color change.   Neurological: Negative for headaches.   Psychiatric/Behavioral: Negative for confusion.     A complete review of systems was performed with pertinent positives and negatives noted in the HPI, and all other systems negative.    Physical Exam   BP: 106/85  Pulse: 103  Temp: 98  F (36.7  C)  Resp: 16  Height: 160 cm (5' 3\")  Weight: 42.2 kg (93 lb)  SpO2: 99 %  Physical Exam  Constitutional:       General: She is not in acute distress.     Appearance: She is not diaphoretic.   HENT:      Head: Atraumatic.      Mouth/Throat:      Mouth: Mucous membranes are dry.      Pharynx: No oropharyngeal exudate.   Eyes:      General: No scleral icterus.     Pupils: Pupils are equal, round, and reactive to light.   Cardiovascular:      Rate and Rhythm: Regular rhythm. Tachycardia present.      Heart sounds: Normal heart sounds. No murmur heard.  No friction rub. No gallop.    Pulmonary:      Effort: Pulmonary effort is normal. No respiratory distress.      Breath sounds: Normal breath sounds. No stridor. No wheezing, rhonchi or rales.   Chest:      Chest wall: No tenderness.   Abdominal:      General: Bowel sounds are normal. There is distension.      Palpations: Abdomen is soft.      Tenderness: There is generalized abdominal tenderness. There is no right CVA tenderness, left CVA tenderness, guarding or rebound. Negative signs include Ba's sign, Rovsing's sign, McBurney's sign, psoas sign and obturator sign.      Hernia: No hernia is present.       Musculoskeletal:         General: No tenderness.      Cervical back: Neck supple.   Skin:     General: Skin is warm.      Findings: No rash.   Neurological:      " General: No focal deficit present.      Cranial Nerves: No cranial nerve deficit.         ED Course      Procedures                Results for orders placed or performed during the hospital encounter of 12/06/21   INR     Status: Normal   Result Value Ref Range    INR 1.04 0.85 - 1.15   Comprehensive metabolic panel     Status: Abnormal   Result Value Ref Range    Sodium 136 133 - 144 mmol/L    Potassium 3.6 3.4 - 5.3 mmol/L    Chloride 102 94 - 109 mmol/L    Carbon Dioxide (CO2) 26 20 - 32 mmol/L    Anion Gap 8 3 - 14 mmol/L    Urea Nitrogen 11 7 - 30 mg/dL    Creatinine 0.47 (L) 0.52 - 1.04 mg/dL    Calcium 8.9 8.5 - 10.1 mg/dL    Glucose 167 (H) 70 - 99 mg/dL    Alkaline Phosphatase 84 40 - 150 U/L    AST 28 0 - 45 U/L    ALT 65 (H) 0 - 50 U/L    Protein Total 6.7 (L) 6.8 - 8.8 g/dL    Albumin 3.0 (L) 3.4 - 5.0 g/dL    Bilirubin Total 0.3 0.2 - 1.3 mg/dL    GFR Estimate >90 >60 mL/min/1.73m2   CBC with platelets and differential     Status: Abnormal   Result Value Ref Range    WBC Count 8.7 4.0 - 11.0 10e3/uL    RBC Count 3.65 (L) 3.80 - 5.20 10e6/uL    Hemoglobin 10.7 (L) 11.7 - 15.7 g/dL    Hematocrit 32.5 (L) 35.0 - 47.0 %    MCV 89 78 - 100 fL    MCH 29.3 26.5 - 33.0 pg    MCHC 32.9 31.5 - 36.5 g/dL    RDW 14.6 10.0 - 15.0 %    Platelet Count 304 150 - 450 10e3/uL    % Neutrophils 82 %    % Lymphocytes 13 %    % Monocytes 3 %    % Eosinophils 1 %    % Basophils 0 %    % Immature Granulocytes 1 %    NRBCs per 100 WBC 0 <1 /100    Absolute Neutrophils 7.1 1.6 - 8.3 10e3/uL    Absolute Lymphocytes 1.1 0.8 - 5.3 10e3/uL    Absolute Monocytes 0.3 0.0 - 1.3 10e3/uL    Absolute Eosinophils 0.1 0.0 - 0.7 10e3/uL    Absolute Basophils 0.0 0.0 - 0.2 10e3/uL    Absolute Immature Granulocytes 0.1 <=0.4 10e3/uL    Absolute NRBCs 0.0 10e3/uL   Extra Red Top Tube     Status: None   Result Value Ref Range    Hold Specimen JIC    iStat Gases (lactate) venous, POCT     Status: Abnormal   Result Value Ref Range    Lactic Acid  POCT 1.3 <=2.0 mmol/L    Bicarbonate Venous POCT 25 21 - 28 mmol/L    O2 Sat, Venous POCT 68 (L) 94 - 100 %    pCO2V Venous POCT 37 (L) 40 - 50 mm Hg    pH Venous POCT 7.44 (H) 7.32 - 7.43    pO2 Venous POCT 34 25 - 47 mm Hg   CBC with platelets differential     Status: Abnormal    Narrative    The following orders were created for panel order CBC with platelets differential.  Procedure                               Abnormality         Status                     ---------                               -----------         ------                     CBC with platelets and d...[[id number]]  Abnormal            Final result                 Please view results for these tests on the individual orders.   ABO/Rh type and screen     Status: None (In process)    Narrative    The following orders were created for panel order ABO/Rh type and screen.  Procedure                               Abnormality         Status                     ---------                               -----------         ------                     Adult Type and Screen[[id number]]                            In process                   Please view results for these tests on the individual orders.   [place] Draw     Status: None    Narrative    The following orders were created for panel order [place] Draw.  Procedure                               Abnormality         Status                     ---------                               -----------         ------                     Extra Red Top Tube[[id number]]                               Final result                 Please view results for these tests on the individual orders.     Medications   0.9% sodium chloride BOLUS (has no administration in time range)     Followed by   sodium chloride 0.9% infusion (has no administration in time range)   CT scan flush (has no administration in time range)   iopamidol (ISOVUE-370) solution 57 mL (has no administration in time range)   HYDROmorphone (PF) (DILAUDID)  injection 0.5 mg (has no administration in time range)   ondansetron (ZOFRAN) injection 4 mg (has no administration in time range)   HYDROmorphone (PF) (DILAUDID) injection 0.5 mg (0.5 mg Intravenous Given 12/6/21 2057)        Assessments & Plan (with Medical Decision Making)  Increasing generalized abd pain in the pt with peritoneal carcinomatosis from gastric ca on chemo per [facility] but [facility] here, labs ok but intractable pain-two doses of dilaudid so far, awaiting CT. Will sign off to incoming EP.       I have reviewed the nursing notes. I have reviewed the findings, diagnosis, plan and need for follow up with the patient.    New Prescriptions    No medications on file       Final diagnoses:   Abdominal pain, generalized   Peritoneal carcinomatosis (H)   I, Marzena Domingo, am serving as a trained medical scribe to document services personally performed by Mario Ma Md, based on the provider's statements to me.     I, Mario Ma Md, was physically present and have reviewed and verified the accuracy of this note documented by Marzena Domingo.      --  Mario Ma Md  [facility] EMERGENCY DEPARTMENT  12/6/2021     Mario Ma MD  12/06/21 1743

## 2021-12-06 NOTE — ED TRIAGE NOTES
Triage Assessment & Note:    Patient presents with: PT reports abdominal pain that increases after PO intake. PT is being treated for stomach CA. PT reports the pain has been increasing over the last week.     Home Treatments/Remedies: None    Febrile / Afebrile? Afebrile     Duration of C/o:  1 week     Romie Azul RN  December 6, 2021

## 2021-12-06 NOTE — ED NOTES
Bed: [facility]  Expected date:   Expected time:   Means of arrival:   Comments:  Avail - stretcher

## 2021-12-07 ENCOUNTER — APPOINTMENT (OUTPATIENT)
Dept: GENERAL RADIOLOGY | Facility: CLINIC | Age: 41
End: 2021-12-07
Attending: HOSPITALIST
Payer: COMMERCIAL

## 2021-12-07 LAB
ANION GAP SERPL CALCULATED.3IONS-SCNC: 9 MMOL/L (ref 3–14)
ATRIAL RATE - MUSE: 80 BPM
BUN SERPL-MCNC: 13 MG/DL (ref 7–30)
CALCIUM SERPL-MCNC: 8.9 MG/DL (ref 8.5–10.1)
CHLORIDE BLD-SCNC: 100 MMOL/L (ref 94–109)
CO2 SERPL-SCNC: 26 MMOL/L (ref 20–32)
CREAT SERPL-MCNC: 0.6 MG/DL (ref 0.52–1.04)
DIASTOLIC BLOOD PRESSURE - MUSE: NORMAL MMHG
ERYTHROCYTE [DISTWIDTH] IN BLOOD BY AUTOMATED COUNT: 15 % (ref 10–15)
GFR SERPL CREATININE-BSD FRML MDRD: >90 ML/MIN/1.73M2
GLUCOSE BLD-MCNC: 256 MG/DL (ref 70–99)
GLUCOSE BLDC GLUCOMTR-MCNC: 119 MG/DL (ref 70–99)
GLUCOSE BLDC GLUCOMTR-MCNC: 138 MG/DL (ref 70–99)
GLUCOSE BLDC GLUCOMTR-MCNC: 189 MG/DL (ref 70–99)
GLUCOSE BLDC GLUCOMTR-MCNC: 215 MG/DL (ref 70–99)
GLUCOSE BLDC GLUCOMTR-MCNC: 242 MG/DL (ref 70–99)
GLUCOSE BLDC GLUCOMTR-MCNC: 274 MG/DL (ref 70–99)
GLUCOSE BLDC GLUCOMTR-MCNC: 292 MG/DL (ref 70–99)
GLUCOSE BLDC GLUCOMTR-MCNC: 298 MG/DL (ref 70–99)
HBA1C MFR BLD: 7.1 % (ref 0–5.6)
HCT VFR BLD AUTO: 29.9 % (ref 35–47)
HGB BLD-MCNC: 9.7 G/DL (ref 11.7–15.7)
HOLD SPECIMEN: NORMAL
INTERPRETATION ECG - MUSE: NORMAL
MCH RBC QN AUTO: 29.4 PG (ref 26.5–33)
MCHC RBC AUTO-ENTMCNC: 32.4 G/DL (ref 31.5–36.5)
MCV RBC AUTO: 91 FL (ref 78–100)
P AXIS - MUSE: 79 DEGREES
PLATELET # BLD AUTO: 283 10E3/UL (ref 150–450)
POTASSIUM BLD-SCNC: 4 MMOL/L (ref 3.4–5.3)
PR INTERVAL - MUSE: 140 MS
QRS DURATION - MUSE: 74 MS
QT - MUSE: 374 MS
QTC - MUSE: 431 MS
R AXIS - MUSE: 79 DEGREES
RBC # BLD AUTO: 3.3 10E6/UL (ref 3.8–5.2)
SODIUM SERPL-SCNC: 135 MMOL/L (ref 133–144)
SYSTOLIC BLOOD PRESSURE - MUSE: NORMAL MMHG
T AXIS - MUSE: 68 DEGREES
VENTRICULAR RATE- MUSE: 80 BPM
WBC # BLD AUTO: 5.4 10E3/UL (ref 4–11)

## 2021-12-07 PROCEDURE — 85027 COMPLETE CBC AUTOMATED: CPT | Performed by: STUDENT IN AN ORGANIZED HEALTH CARE EDUCATION/TRAINING PROGRAM

## 2021-12-07 PROCEDURE — 120N000002 HC R&B MED SURG/OB UMMC

## 2021-12-07 PROCEDURE — 36591 DRAW BLOOD OFF VENOUS DEVICE: CPT | Performed by: HOSPITALIST

## 2021-12-07 PROCEDURE — 36592 COLLECT BLOOD FROM PICC: CPT | Performed by: HOSPITALIST

## 2021-12-07 PROCEDURE — 80048 BASIC METABOLIC PNL TOTAL CA: CPT | Performed by: STUDENT IN AN ORGANIZED HEALTH CARE EDUCATION/TRAINING PROGRAM

## 2021-12-07 PROCEDURE — 250N000011 HC RX IP 250 OP 636: Performed by: HOSPITALIST

## 2021-12-07 PROCEDURE — 250N000011 HC RX IP 250 OP 636: Performed by: EMERGENCY MEDICINE

## 2021-12-07 PROCEDURE — 87040 BLOOD CULTURE FOR BACTERIA: CPT | Performed by: HOSPITALIST

## 2021-12-07 PROCEDURE — 99223 1ST HOSP IP/OBS HIGH 75: CPT | Mod: GC | Performed by: STUDENT IN AN ORGANIZED HEALTH CARE EDUCATION/TRAINING PROGRAM

## 2021-12-07 PROCEDURE — 74018 RADEX ABDOMEN 1 VIEW: CPT | Mod: 26 | Performed by: RADIOLOGY

## 2021-12-07 PROCEDURE — 99233 SBSQ HOSP IP/OBS HIGH 50: CPT | Performed by: STUDENT IN AN ORGANIZED HEALTH CARE EDUCATION/TRAINING PROGRAM

## 2021-12-07 PROCEDURE — 999N000065 XR ABDOMEN PORT 1 VIEWS

## 2021-12-07 PROCEDURE — 99223 1ST HOSP IP/OBS HIGH 75: CPT | Performed by: INTERNAL MEDICINE

## 2021-12-07 PROCEDURE — 83036 HEMOGLOBIN GLYCOSYLATED A1C: CPT | Performed by: HOSPITALIST

## 2021-12-07 PROCEDURE — 99233 SBSQ HOSP IP/OBS HIGH 50: CPT | Performed by: SURGERY

## 2021-12-07 PROCEDURE — 250N000012 HC RX MED GY IP 250 OP 636 PS 637: Performed by: HOSPITALIST

## 2021-12-07 PROCEDURE — 36415 COLL VENOUS BLD VENIPUNCTURE: CPT | Performed by: HOSPITALIST

## 2021-12-07 PROCEDURE — 258N000003 HC RX IP 258 OP 636: Performed by: HOSPITALIST

## 2021-12-07 RX ORDER — ONDANSETRON 2 MG/ML
4 INJECTION INTRAMUSCULAR; INTRAVENOUS EVERY 6 HOURS PRN
Status: DISCONTINUED | OUTPATIENT
Start: 2021-12-07 | End: 2021-12-09 | Stop reason: HOSPADM

## 2021-12-07 RX ORDER — NICOTINE POLACRILEX 4 MG
15-30 LOZENGE BUCCAL
Status: DISCONTINUED | OUTPATIENT
Start: 2021-12-07 | End: 2021-12-07

## 2021-12-07 RX ORDER — INSULIN LISPRO 100 [IU]/ML
0-30 INJECTION, SOLUTION INTRAVENOUS; SUBCUTANEOUS
Status: ON HOLD | COMMUNITY
End: 2021-12-18

## 2021-12-07 RX ORDER — PROCHLORPERAZINE MALEATE 5 MG
5 TABLET ORAL EVERY 6 HOURS PRN
Status: DISCONTINUED | OUTPATIENT
Start: 2021-12-07 | End: 2021-12-09 | Stop reason: HOSPADM

## 2021-12-07 RX ORDER — DEXTROSE MONOHYDRATE 25 G/50ML
25-50 INJECTION, SOLUTION INTRAVENOUS
Status: DISCONTINUED | OUTPATIENT
Start: 2021-12-07 | End: 2021-12-09 | Stop reason: HOSPADM

## 2021-12-07 RX ORDER — LORAZEPAM 0.5 MG/1
0.5 TABLET ORAL EVERY 4 HOURS PRN
COMMUNITY

## 2021-12-07 RX ORDER — ACETAMINOPHEN 325 MG/1
650 TABLET ORAL EVERY 4 HOURS PRN
Status: DISCONTINUED | OUTPATIENT
Start: 2021-12-07 | End: 2021-12-09 | Stop reason: HOSPADM

## 2021-12-07 RX ORDER — NICOTINE POLACRILEX 4 MG
15-30 LOZENGE BUCCAL
Status: DISCONTINUED | OUTPATIENT
Start: 2021-12-07 | End: 2021-12-09 | Stop reason: HOSPADM

## 2021-12-07 RX ORDER — DEXTROSE MONOHYDRATE 25 G/50ML
25-50 INJECTION, SOLUTION INTRAVENOUS
Status: DISCONTINUED | OUTPATIENT
Start: 2021-12-07 | End: 2021-12-07

## 2021-12-07 RX ADMIN — SODIUM CHLORIDE, POTASSIUM CHLORIDE, SODIUM LACTATE AND CALCIUM CHLORIDE: 600; 310; 30; 20 INJECTION, SOLUTION INTRAVENOUS at 14:32

## 2021-12-07 RX ADMIN — INSULIN ASPART 2 UNITS: 100 INJECTION, SOLUTION INTRAVENOUS; SUBCUTANEOUS at 09:12

## 2021-12-07 RX ADMIN — INSULIN ASPART 2 UNITS: 100 INJECTION, SOLUTION INTRAVENOUS; SUBCUTANEOUS at 05:23

## 2021-12-07 RX ADMIN — MORPHINE SULFATE 4 MG: 4 INJECTION INTRAVENOUS at 01:28

## 2021-12-07 RX ADMIN — INSULIN GLARGINE 5 UNITS: 100 INJECTION, SOLUTION SUBCUTANEOUS at 09:09

## 2021-12-07 RX ADMIN — ENOXAPARIN SODIUM 40 MG: 40 INJECTION SUBCUTANEOUS at 09:09

## 2021-12-07 RX ADMIN — SODIUM CHLORIDE, POTASSIUM CHLORIDE, SODIUM LACTATE AND CALCIUM CHLORIDE: 600; 310; 30; 20 INJECTION, SOLUTION INTRAVENOUS at 07:26

## 2021-12-07 RX ADMIN — SODIUM CHLORIDE, POTASSIUM CHLORIDE, SODIUM LACTATE AND CALCIUM CHLORIDE: 600; 310; 30; 20 INJECTION, SOLUTION INTRAVENOUS at 22:15

## 2021-12-07 RX ADMIN — PROCHLORPERAZINE EDISYLATE 5 MG: 5 INJECTION INTRAMUSCULAR; INTRAVENOUS at 01:21

## 2021-12-07 RX ADMIN — INSULIN ASPART 1 UNITS: 100 INJECTION, SOLUTION INTRAVENOUS; SUBCUTANEOUS at 13:18

## 2021-12-07 ASSESSMENT — ACTIVITIES OF DAILY LIVING (ADL)
ADLS_ACUITY_SCORE: 9
ADLS_ACUITY_SCORE: 7
ADLS_ACUITY_SCORE: 9
ADLS_ACUITY_SCORE: 9
ADLS_ACUITY_SCORE: 7
ADLS_ACUITY_SCORE: 9

## 2021-12-07 NOTE — CONSULTS
[facility] Oncology Consultation    Shira Joy MRN# [id number]   Age: 41 year old YOB: 1980     Date of Admission:  12/6/2021    Reason for consult: Metastatic gastric adenocarcinoma, c/f malignant SBO       Requesting physician: Dr. Fofana       Level of consult: Consult, follow and place orders           Assessment and Recommendations:   Assessment:    Ms. Shira Joy is a 42yo with metastatic gastric adenocarcinoma with metastases to the liver, ovaries, and peritoneum most recently treated with FOLFOX + trastuzumab + pembrolizumab who presented with progressive abdominal pain and was found to have evidence of partial SBO.    Diagnoses:  # Metastatic gastric adenocarcinoma  # Malignant bowel obstruction, ?improving  # Peritoneal carcinomatosis  # DM1, related to immunotherapy  # At risk for malnutrition    Unfortunate situation with new malignant bowel obstruction r/t gastric adenocarcinoma c/b peritoneal carcinomatosis. Her oncologic care is through [facility] and [facility]. Her imaging findings at OSH were somewhat mixed with improved hepatic disease burden but possibly worsening peritoneal disease. Our imaging here is difficult to compare given we do not have her 10/2021 imaging, but clinically, this is concerning for progressive disease. Nevertheless, I am somewhat encouraged that her obstruction does appear very much partial and likely improved after NG decompression (perhaps spontaneously). Her pain is completely resolved and she continues to pass flatus (and had been having bowel movements very recently). The hope is that this will resolve spontaneously with conservative management, but certainly she remains at very high risk for recurrent issues.     Recommendations:  - Agree with ongoing conservative management of SBO  - Agree with endocrinology involvement  - Would hope to avoid surgical interventions at this point unless no evidence of resolution in the coming days  - Patient  should continue to maintain appts with [facility] and [facility] on discharge  - Oncology will follow    The patient was seen by and discussed with Dr. Rony Charlton MD PhD  Hematology/Oncology Fellow  Pgr: [phone]         History of Present Illness:     Ms. Shira Joy is a 40yo with metastatic gastric adenocarcinoma with metastases to the liver, ovaries, and peritoneum most recently treated with FOLFOX + trastuzumab + pembrolizumab who presented with progressive abdominal pain and was found to have evidence of partial SBO.    She has had progressive somewhat diffuse abdominal pain over the last week or so. Prior to that, minimal discomfort apart from when eating. She was not using any chronic opioids or other analgesics apart from acetaminophen. She has continued, of late, to pass flatus and have bowel movements. Last BM was yesterday AM, and before that she had a large BM two evenings ago as well. She has not had a BM since, but continues to pass flatus currently. Her abdominal pain has resolved this AM. This occurred after placement of NG for decompression. She does have bilious output, but it appears quite minimal. Her main long-term concern remains ability to tolerate PO intake given ongoing post-prandial pain.    Her CT A/P shows overall a suggestion of progressive disease. She has a RLQ small bowel transition point along with moderate volume ascites. She has had NG placement and conservative management recommended by surgery.     Of note, she recently developed DM1 in the setting pembrolizumab. It does not appear that she has had other immune-related side effects.         Cancer Treatment History:   Oncologists  [place]: Dr. Peterson  [facility]: Dr. Schmidt    Diagnosis: Metastatic gastric adenocarcinoma    Treatment history (copied from last [facility] document in [facility] on 10/14/21)    7/24/2021 Initial Diagnosis   She presented with few weeks of abdominal pain, which she attributed to her  known fibroids. On 07/24/2021, she developed acute onset of severe abdominal pain prompting emergency department evaluation. CT of the abdomen revealed free air concerning for viscus perforation. She underwent exploratory laparotomy which revealed a perforated gastric ulcer. This was repaired with a Frank patch. Additionally, she was noted to have omental and peritoneal nodules which were biopsied. Intraoperative EGD revealed a large gastric mass which was also biopsied.    7/24/2021 Biopsy/Pathology   Gastric biopsy: Poorly differentiated adenocarcinoma with focal Signet ring formation. Immunohistochemical stain for Helicobacter pylori is negative.     Additional biopsies from the mesentery, gastrocolic ligament, and a left pelvic sidewall nodule were all consistent with metastatic poorly differentiated adenocarcinoma.    HER2 FISH was positive for HER2 amplification (HER2/D17Z! Ratio: 2.75)    PD-L1 and MMR testing were pending at the time of initial consultation.     9/3/2021 Biopsy/Pathology   PDL1, mismatch repair, and HER2 IHC on the initial 7/24 biopsy were delayed due to inadvertent cancellation of the tests initially.     Results were ultimately reported 9/2021:     PD-L1 CPS 10 (PD-L1 clone 22C3: tumor cells 0%, TILs 10%)    Mismatch repair intact (IHC)    HER2 1+ (IHC)    9/13/2021 Genetic Testing and Tumor Genotyping   Strong family history esophageal, gastric, colon, and other cancer. Clinical genetics consult obtained, and Common Hereditary Cancers Panel ([facility]) sent     Started on FOLFOX + trastuzumab + pembrolizumab in 08/2021. Plan at 10/14/21 follow-up was to continue this for an additional two months given difficulty in interpreting her restaging scans (given lack of ideal timing for her initial scans).          Past Medical History:   No past medical history on file.     Metastatic gastric adenocarcinoma  DM related to immunotherapy          Past Surgical History:   No past surgical  history on file.          Social History:     Social History     Tobacco Use     Smoking status: Never Smoker     Smokeless tobacco: Never Used   Substance Use Topics     Alcohol use: Not on file             Family History:   No family history on file.            Immunizations:     Immunization History   Administered Date(s) Administered     COVID-19,PF,Pfizer (12+ Yrs) 08/06/2021, 09/02/2021             Allergies:   No Known Allergies          Medications:     Current Facility-Administered Medications   Medication     acetaminophen (TYLENOL) tablet 650 mg     glucose gel 15-30 g    Or     dextrose 50 % injection 25-50 mL    Or     glucagon injection 1 mg     enoxaparin ANTICOAGULANT (LOVENOX) injection 40 mg     HYDROmorphone (PF) (DILAUDID) injection 0.5 mg     insulin aspart (NovoLOG) injection (RAPID ACTING)     insulin glargine (LANTUS PEN) injection 5 Units     lactated ringers infusion     ondansetron (ZOFRAN) injection 4 mg     prochlorperazine (COMPAZINE) tablet 5 mg    Or     prochlorperazine (COMPAZINE) injection 5 mg             Review of Systems:   The Review of Systems is negative other than noted in the HPI          Physical Exam:   Temp: 97.6  F (36.4  C) Temp src: Temporal BP: 114/69 Pulse: 73   Resp: 18 SpO2: 98 % O2 Device: None (Room air)       General: Thin, but reasonably-well-nourished appearing, fatigued but no acute distress.  HEENT: NCAT.Anicteric sclera. MMM. NG in place on LIS with dark bilious output.  Neck: Supple. No LAD.  Lungs: Full and equal expansion. CTAB, no wheezes, rhonchi or rales.  CV: RRR. Normal S1, S2. No m/r/g.  Abd: NABS. Soft, mildly tender in RLQ without rebound or guarding.  Extremities: Warm and well-perfused. No gross malformations. No joint swelling. No edema.  Skin: No rashes or lesions.  Neuro: AOx3, answers questions appropriately. Face symmetric. Moves extremities equally and independently.          Data:     Results for orders placed or performed during the  hospital encounter of 12/06/21 (from the past 24 hour(s))   CBC with platelets differential    Narrative    The following orders were created for panel order CBC with platelets differential.  Procedure                               Abnormality         Status                     ---------                               -----------         ------                     CBC with platelets and d...[[id number]]  Abnormal            Final result                 Please view results for these tests on the individual orders.   ABO/Rh type and screen    Narrative    The following orders were created for panel order ABO/Rh type and screen.  Procedure                               Abnormality         Status                     ---------                               -----------         ------                     Adult Type and Screen[[id number]]                            Final result                 Please view results for these tests on the individual orders.   INR   Result Value Ref Range    INR 1.04 0.85 - 1.15   Comprehensive metabolic panel   Result Value Ref Range    Sodium 136 133 - 144 mmol/L    Potassium 3.6 3.4 - 5.3 mmol/L    Chloride 102 94 - 109 mmol/L    Carbon Dioxide (CO2) 26 20 - 32 mmol/L    Anion Gap 8 3 - 14 mmol/L    Urea Nitrogen 11 7 - 30 mg/dL    Creatinine 0.47 (L) 0.52 - 1.04 mg/dL    Calcium 8.9 8.5 - 10.1 mg/dL    Glucose 167 (H) 70 - 99 mg/dL    Alkaline Phosphatase 84 40 - 150 U/L    AST 28 0 - 45 U/L    ALT 65 (H) 0 - 50 U/L    Protein Total 6.7 (L) 6.8 - 8.8 g/dL    Albumin 3.0 (L) 3.4 - 5.0 g/dL    Bilirubin Total 0.3 0.2 - 1.3 mg/dL    GFR Estimate >90 >60 mL/min/1.73m2   CBC with platelets and differential   Result Value Ref Range    WBC Count 8.7 4.0 - 11.0 10e3/uL    RBC Count 3.65 (L) 3.80 - 5.20 10e6/uL    Hemoglobin 10.7 (L) 11.7 - 15.7 g/dL    Hematocrit 32.5 (L) 35.0 - 47.0 %    MCV 89 78 - 100 fL    MCH 29.3 26.5 - 33.0 pg    MCHC 32.9 31.5 - 36.5 g/dL    RDW 14.6 10.0 - 15.0 %    Platelet  Count 304 150 - 450 10e3/uL    % Neutrophils 82 %    % Lymphocytes 13 %    % Monocytes 3 %    % Eosinophils 1 %    % Basophils 0 %    % Immature Granulocytes 1 %    NRBCs per 100 WBC 0 <1 /100    Absolute Neutrophils 7.1 1.6 - 8.3 10e3/uL    Absolute Lymphocytes 1.1 0.8 - 5.3 10e3/uL    Absolute Monocytes 0.3 0.0 - 1.3 10e3/uL    Absolute Eosinophils 0.1 0.0 - 0.7 10e3/uL    Absolute Basophils 0.0 0.0 - 0.2 10e3/uL    Absolute Immature Granulocytes 0.1 <=0.4 10e3/uL    Absolute NRBCs 0.0 10e3/uL   Adult Type and Screen   Result Value Ref Range    ABO/RH(D) A POS     Antibody Screen Negative Negative    SPECIMEN EXPIRATION DATE [id number]    Lipase   Result Value Ref Range    Lipase 113 73 - 393 U/L   Mobile Draw    Narrative    The following orders were created for panel order Mobile Draw.  Procedure                               Abnormality         Status                     ---------                               -----------         ------                     Extra Red Top Tube[[id number]]                               Final result                 Please view results for these tests on the individual orders.   Extra Red Top Tube   Result Value Ref Range    Hold Specimen JIC    iStat Gases (lactate) venous, POCT   Result Value Ref Range    Lactic Acid POCT 1.3 <=2.0 mmol/L    Bicarbonate Venous POCT 25 21 - 28 mmol/L    O2 Sat, Venous POCT 68 (L) 94 - 100 %    pCO2V Venous POCT 37 (L) 40 - 50 mm Hg    pH Venous POCT 7.44 (H) 7.32 - 7.43    pO2 Venous POCT 34 25 - 47 mm Hg   CT Abdomen Pelvis w Contrast   Result Value Ref Range    Radiologist flags (Urgent)      Small bowel obstruction transition point located in    Narrative    EXAMINATION: CT ABDOMEN PELVIS W CONTRAST, 12/6/2021 7:41 PM    INDICATION: Abdominal distension; Abdominal pain, acute, nonlocalized.  Patient has a HISTORY of metastatic gastric adenocarcinoma to the  liver, ovaries, and peritoneal carcinomatosis.    COMPARISON STUDY: CT 7/24/2021.  PET CT 8/5/2021.    TECHNIQUE: CT scan of the abdomen and pelvis was performed on  multidetector CT scanner using volumetric acquisition technique and  images were reconstructed in multiple planes with variable thickness  and reviewed on dedicated workstations.     CONTRAST: iopamidol (ISOVUE-370) solution 57 mL injected IV     CT scan radiation dose is optimized to minimum requisite dose using  automated dose modulation techniques.    FINDINGS:    Lower thorax: Heart size is normal. No pericardial effusion.  The lung  bases are clear.    Liver: 1.5 x 1.5 cm hypoattenuating mass in hepatic segment 7/8 not  substantially changed from prior. Unchanged hypoattenuating  subcentimeter lesion of the dome of the right hepatic lobe. No  intrahepatic biliary ductal dilation.    Biliary System: Cholelithiasis without secondary evidence of acute  cholecystitis.. The common bile duct measures up to 10 mm, previously  measuring up to 8 mm on 7/24/2021.    Pancreas: No mass or pancreatic ductal dilation.    Adrenal glands: No mass or nodules    Spleen: Normal.    Kidneys: No suspicious mass, obstructing calculus or hydronephrosis.    Gastrointestinal tract: Normal appendix. There are multiple stacked  distended loops of distal small bowel measuring up to 3.7 cm in the  right lower quadrant and central abdomen with focal moderate grade  transition point in the right lower quadrant (series 5 image 319). The  immediate upstream dilated small bowel contains some fecalized  material.  The large bowel is nondilated. There is diffuse  hypoattenuation and thickening of the stomach wall with continued  perigastric soft tissue abnormality along the greater curvature of the  stomach extending inferomedially towards the umbilicus encasing the  transverse colon, increased since 7/24/2021.      Mesentery/peritoneum/retroperitoneum: Moderate volume ascites. No  evidence of free air.    Lymph nodes: No significant  lymphadenopathy.    Vasculature: Patent major abdominal vasculature.    Pelvis: The urinary bladder is displaced to the right pelvis.  Increased multilobulated, heterogeneously enhancing pelvic masses  without definable ovaries, though a new cystic structure in the right  pelvis may represent an ovarian cyst. Increased heterogeneous  enhancement of the myometrium, likely related to tumor involvement.  Mild narrowing of the rectosigmoid junction related to adjacent soft  tissue abnormality.    Osseous structures: No aggressive or acute osseous lesion.      Soft tissues: Within normal limits.  The bilateral breast implants.        Impression    IMPRESSION: This patient with a history of metastatic gastric  adenocarcinoma:  1. Distal small bowel obstruction with transition point in the right  lower quadrant, potentially due to extrinsic narrowing of the small  bowel related to peritoneal disease.  2. Increased perigastric soft tissue abnormality and metastatic  ovarian/pelvic lesions and no significant change in at least one liver  metastasis.   3. Moderate volume ascites.  4. Cholelithiasis and dilated common bile duct without evidence of  acute cholecystitis.      [Urgent Result: Small bowel obstruction transition point located in  the left upper quadrant.]    Finding was identified on 12/6/2021 7:44 PM.     Dr. Ma was contacted by Dr. Berman at 12/6/2021 8:02 PM and  verbalized understanding of the urgent finding.     I have personally reviewed the examination and initial interpretation  and I agree with the findings.    KATELYN SILVA DO         SYSTEM ID:  [id number]   XR Abdomen Port 1 View    Narrative    XR ABDOMEN PORT 1 VIEWS on 12/6/2021 9:09 PM.    INDICATION: NG tube placement verification.    COMPARISON: CT dated 12/6/2021.    FINDINGS:   Portable AP upright radiograph of the abdomen. Gastric tube sidehole  projects immediately inferior to the gastroesophageal junction. There  are dilated loops of  small bowel in the low central abdomen. No  definite pneumatosis or portal venous gas. No intra-abdominal free  air. Excreted contrast within the renal collecting systems  bilaterally. The lung bases are clear.      Impression    IMPRESSION:   Gastric tube sidehole projects immediately inferior to the  gastroesophageal junction. Consider advancing.    I have personally reviewed the examination and initial interpretation  and I agree with the findings.    KATELYN SILVA DO         SYSTEM ID:  [id number]   Asymptomatic COVID-19 Virus (Coronavirus) by PCR Nasopharyngeal    Specimen: Nasopharyngeal; Swab   Result Value Ref Range    SARS CoV2 PCR Negative Negative, Testing sent to reference lab. Results will be returned via unsolicited result    Narrative    Testing was performed using the Xpert Xpress SARS-CoV-2 Assay on the  Cepheid Gene-Xpert Instrument Systems. Additional information about  this Emergency Use Authorization (EUA) assay can be found via the Lab  Guide. This test should be ordered for the detection of SARS-CoV-2 in  individuals who meet SARS-CoV-2 clinical and/or epidemiological  criteria. Test performance is unknown in asymptomatic patients. This  test is for in vitro diagnostic use under the FDA EUA for  laboratories certified under CLIA to perform high complexity testing.  This test has not been FDA cleared or approved. A negative result  does not rule out the presence of PCR inhibitors in the specimen or  target RNA in concentration below the limit of detection for the  assay. The possibility of a false negative should be considered if  the patient's recent exposure or clinical presentation suggests  COVID-19. This test was validated by the [facility]. This laboratory is certified under  the Clinical Laboratory Improvement Amendments of 1988 (CLIA-88) as  qualified to perform high complexity laboratory testing.     EKG 12-lead, tracing only   Result  Value Ref Range    Systolic Blood Pressure  mmHg    Diastolic Blood Pressure  mmHg    Ventricular Rate 80 BPM    Atrial Rate 80 BPM    NY Interval 140 ms    QRS Duration 74 ms     ms    QTc 431 ms    P Axis 79 degrees    R AXIS 79 degrees    T Axis 68 degrees    Interpretation ECG       Sinus rhythm  Normal ECG  Unconfirmed report - interpretation of this ECG is computer generated - see medical record for final interpretation  Confirmed by - EMERGENCY ROOM, PHYSICIAN ([id number]),  JUANA MARIE ([id number]) on 12/7/2021 8:13:03 AM     XR Abdomen Port 1 View    Narrative    EXAM: XR ABDOMEN PORT 1 VIEWS, 12/7/2021 2:39 AM    INDICATION: NG advancement    COMPARISON: Abdominal x-ray 12/6/2021, abdomen/pelvis CT 12/6/2021    FINDINGS  Technique: Upright AP view of the abdomen.      Devices: Gastric tube passes below the left hemidiaphragm with tip and  side port projected over the left upper quadrant. Partially visualized  catheter projected over the high right atrium.    No dilated bowel or definite pneumatosis visualized.      Impression    IMPRESSION:   Gastric tube tip and sidehole project over the stomach.    I have personally reviewed the examination and initial interpretation  and I agree with the findings.    OZ LANDIS MD         SYSTEM ID:  [id number]   Glucose by meter   Result Value Ref Range    GLUCOSE BY METER POCT 292 (H) 70 - 99 mg/dL   Glucose by meter   Result Value Ref Range    GLUCOSE BY METER POCT 298 (H) 70 - 99 mg/dL   Hemoglobin A1c   Result Value Ref Range    Hemoglobin A1C 7.1 (H) 0.0 - 5.6 %   Extra Tube    Narrative    The following orders were created for panel order Extra Tube.  Procedure                               Abnormality         Status                     ---------                               -----------         ------                     Extra Green Top (Lithium...[[id number]]                      Final result                 Please view results for these tests on the  individual orders.   Extra Green Top (Lithium Heparin) Tube   Result Value Ref Range    Hold Specimen JIC    CBC with platelets   Result Value Ref Range    WBC Count 5.4 4.0 - 11.0 10e3/uL    RBC Count 3.30 (L) 3.80 - 5.20 10e6/uL    Hemoglobin 9.7 (L) 11.7 - 15.7 g/dL    Hematocrit 29.9 (L) 35.0 - 47.0 %    MCV 91 78 - 100 fL    MCH 29.4 26.5 - 33.0 pg    MCHC 32.4 31.5 - 36.5 g/dL    RDW 15.0 10.0 - 15.0 %    Platelet Count 283 150 - 450 10e3/uL   Basic metabolic panel   Result Value Ref Range    Sodium 135 133 - 144 mmol/L    Potassium 4.0 3.4 - 5.3 mmol/L    Chloride 100 94 - 109 mmol/L    Carbon Dioxide (CO2) 26 20 - 32 mmol/L    Anion Gap 9 3 - 14 mmol/L    Urea Nitrogen 13 7 - 30 mg/dL    Creatinine 0.60 0.52 - 1.04 mg/dL    Calcium 8.9 8.5 - 10.1 mg/dL    Glucose 256 (H) 70 - 99 mg/dL    GFR Estimate >90 >60 mL/min/1.73m2   Glucose by meter   Result Value Ref Range    GLUCOSE BY METER POCT 242 (H) 70 - 99 mg/dL

## 2021-12-07 NOTE — H&P
[facility]    History and Physical - Hospitalist Service, Gold Night 2       Date of Admission:  12/6/2021    Assessment & Plan      Shira Joy is a 41 year old female admitted on 12/6/2021. She has PMHx of metastatic gastric adenocarcinoma with liver and ovarian metastases and peritoneal carcinomatosis (diagnosed 7/2021, on treatment with FOLFOX plus trastuzumab and keydtruda since 8/25/21). She is admitted with abdominal pain.      #Small Mitali Obstruction  #Gastric Adenocarcinoma  CT abdomen showed distal small bowel obstruction with transition point in the right lower quadrant, potentially due to extrinsic narrowing of the small  bowel related to peritoneal disease. Cholelithiasis and dilated common bile duct without evidence of acute cholecystitis. Lactic acid normal. Hemodynamically stable. Surgery recommendation appreciated.  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 A/W autoimmune related hypothalamic, adrenal, thyroid, diabetes.  Normal adrenal, thyroid labs on last admission at [facility]. Discharged on lantus 15 once daily at bedtime, humalog ICR 10, ISF 50, NPH 5 units PRN with steroids chemotherapy. Patient has CGM and gets low alarms.  - Starting on 5 units Lantus with ISS will adjust as needed       Diet: NPO for Medical/Clinical Reasons Except for: Meds    DVT Prophylaxis: Enoxaparin (Lovenox) SQ  Magana Catheter: Not present  Central Lines: None  Code Status: Full Code      Clinically Significant Risk Factors Present on Admission                   Disposition Plan   Expected discharge:  2-3 recommended to prior living arrangement once adequate pain management/ tolerating PO medications.     The patient's care was discussed with the Patient.    Hemanth Fofana MD  [facility]  Securely message with the Vocera Web Console (learn more here)  Text page via [facility] Paging/Directory    Please see sign in/sign out for up to date coverage information    ______________________________________________________________________    Chief Complaint   Abdominal pain, N/V    History is obtained from the patient    History of Present Illness   Shira Joy is a 41 year old female who with history of metastatic gastric adenocarcinoma (diagnosed 7/2021, on treatment with FOLFOX plus trastuzumab and pembrolizumab (Keytruda) since 8/25/21 with liver and ovarian mets and peritoneal carcinomatosis and newly diagnosed autoimmune DMI d/t Keytruda presenting to the ED with complaint of abdominal pain.      Recently seen by Dr. Waters in clinic in 7/21 and deemed not a surgical candidate, also recently admitted to [facility] for nausea/vomitting/DM ketosis presumed Autoimmune type 1 DM. She is currently on Lantus 15 but do get hypoglycemic at times. She also endorsed weight loss and fatigue from chemotherapy.     Last BM this AM. Passing gas. Nausea / dry heaving. Gradually worsening abdominal pain in the last week. Her last chemo treatment was 1 week ago today, but has had recurrent abdominal pain over the past few months. She describes it as a constant, throbbing,diffuse pain worse on the left side. BMs do not relieve pain. She had blood in her stool last night. She had a normal, non-bloody BM this morning. No fevers. S  Patient states that she is not sure if she would want surgery if needed, but  and her would want to discuss available options, including surgery if needed.     Here in the ED, . Vitals otherwise WNL. Hgb 10.7. INR 1.0. Prior Hgb in October was 10.2. She has no leukocytosis. Electrolytes are within normal limits. ALT is mildly elevated at 65, remainder of LFTs are within normal limits. Lactate is 1.3. UA pending.  CT of the abdomen demonstrates a bowel obstruction with transition  point in the lower right quadrant.  NG tube placed with XRA confirming positioning      Review of Systems    The 10 point Review of Systems is negative other than noted in the HPI or here.     Past Medical History    I have reviewed this patient's medical history and updated it with pertinent information if needed.   No past medical history on file.    Past Surgical History   I have reviewed this patient's surgical history and updated it with pertinent information if needed.  No past surgical history on file.    Social History   I have reviewed this patient's social history and updated it with pertinent information if needed.  Social History     Tobacco Use     Smoking status: Never Smoker     Smokeless tobacco: Never Used   Substance Use Topics     Alcohol use: Not on file     Drug use: Not on file       Family History     Family history of multiple cancers. Lung, Melanoma and breast     Prior to Admission Medications   Prior to Admission Medications   Prescriptions Last Dose Informant Patient Reported? Taking?   acetaminophen 500 MG CAPS   Yes No   Sig: Take 1,000 mg by mouth   ondansetron (ZOFRAN-ODT) 4 MG ODT tab   Yes No   Patient not taking: Reported on 8/13/2021   oxyCODONE (ROXICODONE) 5 MG tablet   Yes No   Patient not taking: Reported on 8/13/2021   pantoprazole (PROTONIX) 40 MG EC tablet   Yes No      Facility-Administered Medications: None     Allergies   No Known Allergies    Physical Exam   Vital Signs: Temp: 97.5  F (36.4  C) Temp src: Oral BP: 115/69 Pulse: 68   Resp: 20 SpO2: 94 % O2 Device: None (Room air)    Weight: 93 lbs 0 oz  General Appearance: Pleasant, NAD, cachectic   Eyes: PERRLA  HEENT: ATNC  Respiratory: CTA B/L, no crackles or wheezing  Cardiovascular: S1, S2, no gallop  GI: Soft, generalized tenderness, no rebound or guarding, mildly distended LLQ, Bowel sounds active in all quadrants  Lymph/Hematologic: Normal  Genitourinary: Normal  Skin: No rashes  Musculoskeletal:  Normal  Neurologic: Gross motor normal  Psychiatric: AAA X 3, normal mood and affect      Data   Data reviewed today: I reviewed all medications, new labs and imaging results over the last 24 hours. I personally reviewed the abdominal CT image(s) showing SBO.    Recent Labs   Lab 12/07/21  0251 12/06/21  1538   WBC  --  8.7   HGB  --  10.7*   MCV  --  89   PLT  --  304   INR  --  1.04   NA  --  136   POTASSIUM  --  3.6   CHLORIDE  --  102   CO2  --  26   BUN  --  11   CR  --  0.47*   ANIONGAP  --  8   JORGE L  --  8.9   * 167*   ALBUMIN  --  3.0*   PROTTOTAL  --  6.7*   BILITOTAL  --  0.3   ALKPHOS  --  84   ALT  --  65*   AST  --  28   LIPASE  --  113

## 2021-12-07 NOTE — PROGRESS NOTES
General Surgery Progress Note  12/07/2021   ------------------------------------------------------------------------------------------------  Subjective:  This AM, patient states she has been passing gas, last BM yesterday. Minimal NG output. Nausea controlled with minimal anti-emetics. Not reporting pain. Overnight, NG tube was repositioned   ------------------------------------------------------------------------------------------------  Objective:  Temp:  [97.5  F (36.4  C)-98  F (36.7  C)] 97.5  F (36.4  C)  Pulse:  [] 68  Resp:  [16-20] 20  BP: (106-133)/() 115/69  SpO2:  [94 %-99 %] 94 %    No intake/output data recorded.      Gen: Awake, interactive, NAD, NG in place  Resp: breathing comfortably on room air  CV: regular rate, appears well perfused  Abd: soft, slightly distended, nontender  Ext: palpable pulses, no edema     Labs:  Recent Labs   Lab 12/06/21  1538   WBC 8.7   HGB 10.7*          Recent Labs   Lab 12/07/21  0508 12/07/21  0251 12/06/21  1538   NA  --   --  136   POTASSIUM  --   --  3.6   CHLORIDE  --   --  102   CO2  --   --  26   BUN  --   --  11   CR  --   --  0.47*   * 292* 167*   JORGE L  --   --  8.9       Imaging:  Results for orders placed or performed during the hospital encounter of 12/06/21   CT Abdomen Pelvis w Contrast   Result Value Ref Range    Radiologist flags (Urgent)      Small bowel obstruction transition point located in    Impression    IMPRESSION: This patient with a history of metastatic gastric  adenocarcinoma:  1. Distal small bowel obstruction with transition point in the right  lower quadrant, potentially due to extrinsic narrowing of the small  bowel related to peritoneal disease.  2. Increased perigastric soft tissue abnormality and metastatic  ovarian/pelvic lesions and no significant change in at least one liver  metastasis.   3. Moderate volume ascites.  4. Cholelithiasis and dilated common bile duct without evidence of  acute  cholecystitis.      [Urgent Result: Small bowel obstruction transition point located in  the left upper quadrant.]    Finding was identified on 12/6/2021 7:44 PM.     Dr. Ma was contacted by Dr. Berman at 12/6/2021 8:02 PM and  verbalized understanding of the urgent finding.     I have personally reviewed the examination and initial interpretation  and I agree with the findings.    KATELYN SILVA DO         SYSTEM ID:  [id number]   XR Abdomen Port 1 View    Impression    IMPRESSION:   Gastric tube sidehole projects immediately inferior to the  gastroesophageal junction. Consider advancing.    I have personally reviewed the examination and initial interpretation  and I agree with the findings.    KATELYN SILVA DO         SYSTEM ID:  [id number]   XR Abdomen Port 1 View    Impression    IMPRESSION:   Gastric tube tip and sidehole project over the stomach.    I have personally reviewed the examination and initial interpretation  and I agree with the findings.    OZ LANDIS MD         SYSTEM ID:  [id number]          =======================================================  Assessment/Plan:   41 year old female with metastatic gastric adenocarcinoma presenting with increasing abdominal pain and hard stools with CT of the abdomen demonstrates a bowel obstruction with transition point in the left upper quadrant, with moderate volume ascites.  Patient has been passing gas, nausea controlled with minimal usage of anti-emetics, pain improving.    - NG tube in place  - Recommend pulling NG, if patient tolerates and passes gas, OK to ADAT  - IVF LR @ 125  - Medical management of SBO per primary team  - Encourage ambulation 4-5x/day  - PPx: Lovenox  - Not a candidate while inpatient for palliative G tube placement given her moderate volume ascites.  - General surgery to continue to follow     Seen, examined, and discussed with chief resident Dr. Georges, who will discuss with staff.    Julia José MD PGY-1  she/her

## 2021-12-07 NOTE — PROVIDER NOTIFICATION
Patient tolerated NG clamp for more than 2 hours. NG removed. Patient requested something to eat.Inhouse Webtext sent to provider @[phone].

## 2021-12-07 NOTE — ED PROVIDER NOTES
Sign out Provider: Anil  Sign out Plan: 41-year-old female with metastatic gastric adenocarcinoma presenting with increasing abdominal pain and one episode body stool. Currently awaiting CT of the abdomen for further evaluation.    Reassessment: I have reviewed the patient's labs which demonstrate a hemoglobin of 10.7. Prior hemoglobin in October was 10.2. She has no leukocytosis. Electrolytes are within normal limits. ALT is mildly elevated at 65, remainder of LFTs are within normal limits. Lactate is 1.3. UA pending.  CT of the abdomen demonstrates a bowel obstruction of the distal small bowel at the right lower quadrant.  Reviewed with surgery and recommended NG tube placement.  Patient was seen and evaluated by surgery and recommended medical management.  She will be seen tomorrow with possible palliative Gtube offered.  Will admit to medicine for further symptom management.      Disposition: Admit           Jeri Garces MD  12/07/21 9864

## 2021-12-07 NOTE — PROGRESS NOTES
[facility]    Medicine Progress Note - Hospitalist Service, Gold 8       Date of Admission:  12/6/2021    Assessment & Plan          Shira Joy is a 41 year old female admitted on 12/6/2021. She has PMHx of metastatic gastric adenocarcinoma with liver and ovarian metastases and peritoneal carcinomatosis (diagnosed 7/2021, on treatment with FOLFOX plus trastuzumab and keydtruda since 8/25/21). She is admitted with abdominal pain.      #Small Mitali Obstruction  #Gastric Adenocarcinoma with metastases to liver and ovaries, peritoneal carcinomatosis  #Abdominal pain, improving  CT abdomen showed distal small bowel obstruction with transition point in the right lower quadrant, potentially due to extrinsic narrowing of the small bowel related to peritoneal disease. Cholelithiasis and dilated common bile duct without evidence of acute cholecystitis. Lactic acid normal. Hemodynamically stable. Surgery recommendation for conservative management currently.  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 A/W autoimmune related hypothalamic, adrenal, thyroid, diabetes.  Normal adrenal, thyroid labs on last admission at [facility]. Discharged on lantus 15 once daily at bedtime, humalog ICR 10, ISF 50, NPH 5 units PRN with steroids chemotherapy. Patient has CGM and gets low alarms.  -Consult endocrinology, appreciate recs  - Started on 5 units Lantus with ISS, will adjust as needed  -Hypoglycemia protocol       Diet: NPO for Medical/Clinical Reasons Except for: Meds    DVT Prophylaxis: Enoxaparin (Lovenox) SQ  Magana Catheter: Not present  Central Lines: None  Code  Status: Full Code      Disposition Plan   Expected discharge:   12/8/21 recommended to prior living arrangement once adequate pain management/ tolerating PO medications and safe disposition plan/ TCU bed available.     The patient's care was discussed with the Bedside Nurse and Patient.    Moe Ridley MD  Hospitalist Service, [address]  Securely message with the Vocera Web Console (learn more here)  Text page via [employer] Paging/Directory    Please see sign in/sign out for up to date coverage information    Clinically Significant Risk Factors Present on Admission                       ______________________________________________________________________    Interval History   Patient is doing okay overall.  Abdominal pain has improved.  No chest pain or shortness of breath.  No fever or chills.  She is passing flatus but has not had a bowel movement yet.  No current nausea or vomiting.  Four-point ROS otherwise negative.    Data reviewed today: I reviewed all medications, new labs and imaging results over the last 24 hours. I personally reviewed no images or EKG's today.    Physical Exam   Vital Signs: Temp: 97.6  F (36.4  C) Temp src: Temporal BP: 114/69 Pulse: 73   Resp: 18 SpO2: 98 % O2 Device: None (Room air)    Weight: 93 lbs 0 oz  General Appearance: Pleasant, NAD, cachectic   Respiratory: CTA B/L, no crackles or wheezing  Cardiovascular: S1, S2, no gallop  GI: Soft, mild generalized tenderness on palpation, no rebound or guarding, mildly distended LLQ, Bowel sounds active in all quadrants  Skin: No rashes  Musculoskeletal: Normal  Neurologic: Gross motor normal  Psychiatric: AAA X 3, normal mood and affect    Data   Recent Labs   Lab 12/07/21  0852 12/07/21  0508 12/07/21  0251 12/06/21  1538   WBC  --   --   --  8.7   HGB  --   --   --  10.7*   MCV  --   --   --  89   PLT  --   --   --  304   INR  --   --   --  1.04   NA  --   --   --  136   POTASSIUM   --   --   --  3.6   CHLORIDE  --   --   --  102   CO2  --   --   --  26   BUN  --   --   --  11   CR  --   --   --  0.47*   ANIONGAP  --   --   --  8   JORGE L  --   --   --  8.9   * 298* 292* 167*   ALBUMIN  --   --   --  3.0*   PROTTOTAL  --   --   --  6.7*   BILITOTAL  --   --   --  0.3   ALKPHOS  --   --   --  84   ALT  --   --   --  65*   AST  --   --   --  28   LIPASE  --   --   --  113     Recent Results (from the past 24 hour(s))   CT Abdomen Pelvis w Contrast   Result Value    Radiologist flags (Urgent)     Small bowel obstruction transition point located in    Narrative    EXAMINATION: CT ABDOMEN PELVIS W CONTRAST, 12/6/2021 7:41 PM    INDICATION: Abdominal distension; Abdominal pain, acute, nonlocalized.  Patient has a HISTORY of metastatic gastric adenocarcinoma to the  liver, ovaries, and peritoneal carcinomatosis.    COMPARISON STUDY: CT 7/24/2021. PET CT 8/5/2021.    TECHNIQUE: CT scan of the abdomen and pelvis was performed on  multidetector CT scanner using volumetric acquisition technique and  images were reconstructed in multiple planes with variable thickness  and reviewed on dedicated workstations.     CONTRAST: iopamidol (ISOVUE-370) solution 57 mL injected IV     CT scan radiation dose is optimized to minimum requisite dose using  automated dose modulation techniques.    FINDINGS:    Lower thorax: Heart size is normal. No pericardial effusion.  The lung  bases are clear.    Liver: 1.5 x 1.5 cm hypoattenuating mass in hepatic segment 7/8 not  substantially changed from prior. Unchanged hypoattenuating  subcentimeter lesion of the dome of the right hepatic lobe. No  intrahepatic biliary ductal dilation.    Biliary System: Cholelithiasis without secondary evidence of acute  cholecystitis.. The common bile duct measures up to 10 mm, previously  measuring up to 8 mm on 7/24/2021.    Pancreas: No mass or pancreatic ductal dilation.    Adrenal glands: No mass or nodules    Spleen:  Normal.    Kidneys: No suspicious mass, obstructing calculus or hydronephrosis.    Gastrointestinal tract: Normal appendix. There are multiple stacked  distended loops of distal small bowel measuring up to 3.7 cm in the  right lower quadrant and central abdomen with focal moderate grade  transition point in the right lower quadrant (series 5 image 319). The  immediate upstream dilated small bowel contains some fecalized  material.  The large bowel is nondilated. There is diffuse  hypoattenuation and thickening of the stomach wall with continued  perigastric soft tissue abnormality along the greater curvature of the  stomach extending inferomedially towards the umbilicus encasing the  transverse colon, increased since 7/24/2021.      Mesentery/peritoneum/retroperitoneum: Moderate volume ascites. No  evidence of free air.    Lymph nodes: No significant lymphadenopathy.    Vasculature: Patent major abdominal vasculature.    Pelvis: The urinary bladder is displaced to the right pelvis.  Increased multilobulated, heterogeneously enhancing pelvic masses  without definable ovaries, though a new cystic structure in the right  pelvis may represent an ovarian cyst. Increased heterogeneous  enhancement of the myometrium, likely related to tumor involvement.  Mild narrowing of the rectosigmoid junction related to adjacent soft  tissue abnormality.    Osseous structures: No aggressive or acute osseous lesion.      Soft tissues: Within normal limits.  The bilateral breast implants.        Impression    IMPRESSION: This patient with a history of metastatic gastric  adenocarcinoma:  1. Distal small bowel obstruction with transition point in the right  lower quadrant, potentially due to extrinsic narrowing of the small  bowel related to peritoneal disease.  2. Increased perigastric soft tissue abnormality and metastatic  ovarian/pelvic lesions and no significant change in at least one liver  metastasis.   3. Moderate volume  ascites.  4. Cholelithiasis and dilated common bile duct without evidence of  acute cholecystitis.      [Urgent Result: Small bowel obstruction transition point located in  the left upper quadrant.]    Finding was identified on 12/6/2021 7:44 PM.     Dr. Ma was contacted by Dr. Berman at 12/6/2021 8:02 PM and  verbalized understanding of the urgent finding.     I have personally reviewed the examination and initial interpretation  and I agree with the findings.    KATELYN SILVA DO         SYSTEM ID:  [id number]   XR Abdomen Port 1 View    Narrative    XR ABDOMEN PORT 1 VIEWS on 12/6/2021 9:09 PM.    INDICATION: NG tube placement verification.    COMPARISON: CT dated 12/6/2021.    FINDINGS:   Portable AP upright radiograph of the abdomen. Gastric tube sidehole  projects immediately inferior to the gastroesophageal junction. There  are dilated loops of small bowel in the low central abdomen. No  definite pneumatosis or portal venous gas. No intra-abdominal free  air. Excreted contrast within the renal collecting systems  bilaterally. The lung bases are clear.      Impression    IMPRESSION:   Gastric tube sidehole projects immediately inferior to the  gastroesophageal junction. Consider advancing.    I have personally reviewed the examination and initial interpretation  and I agree with the findings.    KATELYN SILVA DO         SYSTEM ID:  [id number]   XR Abdomen Port 1 View    Narrative    EXAM: XR ABDOMEN PORT 1 VIEWS, 12/7/2021 2:39 AM    INDICATION: NG advancement    COMPARISON: Abdominal x-ray 12/6/2021, abdomen/pelvis CT 12/6/2021    FINDINGS  Technique: Upright AP view of the abdomen.      Devices: Gastric tube passes below the left hemidiaphragm with tip and  side port projected over the left upper quadrant. Partially visualized  catheter projected over the high right atrium.    No dilated bowel or definite pneumatosis visualized.      Impression    IMPRESSION:   Gastric tube tip and sidehole project over  the stomach.    I have personally reviewed the examination and initial interpretation  and I agree with the findings.    OZ LANDIS MD         SYSTEM ID:  [id number]     Medications     lactated ringers 125 mL/hr at 12/07/21 0726       enoxaparin ANTICOAGULANT  40 mg Subcutaneous Q24H     insulin aspart  1-4 Units Subcutaneous Q4H     insulin glargine  5 Units Subcutaneous QAM AC

## 2021-12-07 NOTE — PROGRESS NOTES
Admitted/transferred from: ED  2 RN skin assessment completed by Melissa Chavez, RN and Carolyn Cristobal RN.  Skin assessment finding: Small red area on bilateral thighs. No other skin deficits noted.   Interventions/actions: Encourage repositioning.     Will continue to monitor.

## 2021-12-07 NOTE — CONSULTS
Emergency General Surgery Consultation Note  [facility]    Shira Joy MRN# [id number]   Age: 41 year old YOB: 1980     Date of Admission:  12/6/21  Reason for consult: SBO in setting of metastatic gastric cancer   Requesting physician: Dr. Garces   Level of consult: Consult, follow and place orders          Assessment and Recommendations   41-year-old female with metastatic gastric adenocarcinoma presenting with increasing abdominal pain and hard stools with CT of the abdomen demonstrates a bowel obstruction with transition point in the left upper quadrant, with moderate volume ascites.     - Recommend admission to Primary Medicine  - Recommend Oncology consult - pain control  - Recommend Endocrinology consult for ongoing DMI - autoimmune  - No acute surgical intervention  - NG tube placement  - NPO with IVF  - AM labs, electrolyte repletion  - Monitor abdominal exam  - Consideration of palliative G tube placement pending EGS evaluation in AM; likely not a candidate given moderate volume ascites    EGS team to see in AM    Discussed with chief resident Dr. Villasenor; d/w EGS staff Dr. Brianna Cano MD  Surgery PGY-2          History of Present Illness:   CC: abdominal pain, nausea, vomiting     History is obtained from the patient and EMR    41 year old female with PMH of metastatic gastric adenocarcinoma (diagnosed 7/2021, on treatment with FOLFOX plus trastuzumab and pembrolizumab (Keytruda) since 8/25/21 with liver and ovarian mets and peritoneal carcinomatosis and newly diagnosed autoimmune DMI d/t Keytruda presenting to the ED with complaint of abdominal pain.     Recently seen by Dr. Waters in clinic in 7/21 and deemed not a surgical candidate, also recently admitted to [facility] for nausea/vomitting/DM ketosis with BG was 423, beta hydroxybutyrate 4.3, and HgbA1c 7.2. 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 Endocrine was consulted and pt received DM education. She also endorsed weight loss and fatigue from chemotherapy. Outside labs with significant hyperglycemia and she was directed to the ER. She was discharged on Lantus 15 units daily with prandial insulin sliding scale and daily NPH when she is getting steroids. Freestyle Ronn Sensor was placed. Pt seemed to have a good understanding of insulin at discharge, completed DM education with follow-up with Oncology.     On review, patient notes having hard stools, straining. last BM this AM. Passing gas. Yesterday one hard BM with blood streaks. Passing gas. Nausea / dry heaving. Patient and patient's  reports new and gradually worsening abdominal pain in the last week. Her last chemo treatment was 1 week ago today, but has had recurrent abdominal pain over the past few months. She describes it as a constant, throbbing pain. It is diffuse, but worse on the left side. She reports that her abdomen becomes hard and warm, especially after eating. Her  notes that it becomes visibly distended. BMs do not relieve pain. She had blood in her stool last night. She had a normal, non-bloody BM this morning. No fevers. She notes that she took 5 units of insulin prior to eating today and was unable to each much. Patient states that she is not sure if she would want surgery if needed, but  and her would want to discuss available options, including surgery if needed.    Here in the ED, . Vitals otherwise WNL. Hgb 10.7. INR 1.0. Prior Hgb in October was 10.2. She has no leukocytosis. Electrolytes are within normal limits. ALT is mildly elevated at 65, remainder of LFTs are within normal limits. Lactate is 1.3. UA pending.  CT of the abdomen demonstrates a bowel obstruction with transition point in the lower right quadrant.  NG tube placed with XRA confirming positioning          Past Medical History:   Gastric adenocarcinoma with  "liver and ovarian metastasis on Keytruda, Herceptin, Steroids  Autoimmune DMI          Past Surgical History:   Prior midline ex-lap   x 2          Social History:     , three kids, 19,11,9; two daughters, one son  Lives in [place], [place]    Social History     Socioeconomic History     Marital status:      Spouse name: Not on file     Number of children: Not on file     Years of education: Not on file     Highest education level: Not on file   Occupational History     Not on file   Tobacco Use     Smoking status: Never Smoker     Smokeless tobacco: Never Used   Substance and Sexual Activity     Alcohol use: Not on file     Drug use: Not on file     Sexual activity: Not on file   Other Topics Concern     Not on file   Social History Narrative     Not on file     Social Determinants of Health     Financial Resource Strain: Not on file   Food Insecurity: Not on file   Transportation Needs: Not on file   Physical Activity: Not on file   Stress: Not on file   Social Connections: Not on file   Intimate Partner Violence: Not on file   Housing Stability: Not on file             Family History:   No family history on file.          Allergies:    No Known Allergies          Medications:   No current facility-administered medications on file prior to encounter.  acetaminophen 500 MG CAPS, Take 1,000 mg by mouth  ondansetron (ZOFRAN-ODT) 4 MG ODT tab,   oxyCODONE (ROXICODONE) 5 MG tablet,   pantoprazole (PROTONIX) 40 MG EC tablet,               Review of Systems:      All other review of systems negative, except for what is mentioned above        Physical Exam:   /85   Pulse 103   Temp 98  F (36.7  C) (Oral)   Resp 16   Ht 1.6 m (5' 3\")   Wt 42.2 kg (93 lb)   LMP 2021   SpO2 99%   BMI 16.47 kg/m    General: Alert, interactive, cachectic, uncomfortable appearing  Resp: NLB on RA  NG tube in place with yellow-green output  Cardiac: RRR  Abdomen: Soft, moderately tender to palpation LLQ, " nondistended. No rebound or guarding.  Well healed midline incision (verticle)  Extremities: No LE edema or obvious joint abnormalities  Skin: Warm and dry, no jaundice or rash  Neuro: A&Ox3, CN 2-12 intact, VILLALOBOS          Data:   All laboratory data reviewed     XR Abdomen Port 1 View    Result Date: 12/6/2021  XR ABDOMEN PORT 1 VIEWS on 12/6/2021 9:09 PM. INDICATION: NG tube placement verification. COMPARISON: CT dated 12/6/2021. FINDINGS: Portable AP upright radiograph of the abdomen. Gastric tube sidehole projects immediately inferior to the gastroesophageal junction. There are dilated loops of small bowel in the low central abdomen. No definite pneumatosis or portal venous gas. No intra-abdominal free air. Excreted contrast within the renal collecting systems bilaterally. The lung bases are clear.     IMPRESSION: Gastric tube sidehole projects immediately inferior to the gastroesophageal junction. Consider advancing. I have personally reviewed the examination and initial interpretation and I agree with the findings. KATELYN SILVA DO   SYSTEM ID:  [id number]    CT Abdomen Pelvis w Contrast    Result Date: 12/6/2021  EXAMINATION: CT ABDOMEN PELVIS W CONTRAST, 12/6/2021 7:41 PM INDICATION: Abdominal distension; Abdominal pain, acute, nonlocalized. Patient has a HISTORY of metastatic gastric adenocarcinoma to the liver, ovaries, and peritoneal carcinomatosis. COMPARISON STUDY: CT 7/24/2021. PET CT 8/5/2021. TECHNIQUE: CT scan of the abdomen and pelvis was performed on multidetector CT scanner using volumetric acquisition technique and images were reconstructed in multiple planes with variable thickness and reviewed on dedicated workstations. CONTRAST: iopamidol (ISOVUE-370) solution 57 mL injected IV CT scan radiation dose is optimized to minimum requisite dose using automated dose modulation techniques. FINDINGS: Lower thorax: Heart size is normal. No pericardial effusion.  The lung bases are clear. Liver: 1.5 x 1.5  cm hypoattenuating mass in hepatic segment 7/8 not substantially changed from prior. Unchanged hypoattenuating subcentimeter lesion of the dome of the right hepatic lobe. No intrahepatic biliary ductal dilation. Biliary System: Cholelithiasis without secondary evidence of acute cholecystitis.. The common bile duct measures up to 10 mm, previously measuring up to 8 mm on 7/24/2021. Pancreas: No mass or pancreatic ductal dilation. Adrenal glands: No mass or nodules Spleen: Normal. Kidneys: No suspicious mass, obstructing calculus or hydronephrosis. Gastrointestinal tract: Normal appendix. There are multiple stacked distended loops of distal small bowel measuring up to 3.7 cm in the right lower quadrant and central abdomen with focal moderate grade transition point in the right lower quadrant (series 5 image 319). The immediate upstream dilated small bowel contains some fecalized material.  The large bowel is nondilated. There is diffuse hypoattenuation and thickening of the stomach wall with continued perigastric soft tissue abnormality along the greater curvature of the stomach extending inferomedially towards the umbilicus encasing the transverse colon, increased since 7/24/2021.  Mesentery/peritoneum/retroperitoneum: Moderate volume ascites. No evidence of free air. Lymph nodes: No significant lymphadenopathy. Vasculature: Patent major abdominal vasculature. Pelvis: The urinary bladder is displaced to the right pelvis. Increased multilobulated, heterogeneously enhancing pelvic masses without definable ovaries, though a new cystic structure in the right pelvis may represent an ovarian cyst. Increased heterogeneous enhancement of the myometrium, likely related to tumor involvement. Mild narrowing of the rectosigmoid junction related to adjacent soft tissue abnormality. Osseous structures: No aggressive or acute osseous lesion.   Soft tissues: Within normal limits.  The bilateral breast implants.      IMPRESSION: This  patient with a history of metastatic gastric adenocarcinoma: 1. Distal small bowel obstruction with transition point in the right lower quadrant, potentially due to extrinsic narrowing of the small bowel related to peritoneal disease. 2. Increased perigastric soft tissue abnormality and metastatic ovarian/pelvic lesions and no significant change in at least one liver metastasis. 3. Moderate volume ascites. 4. Cholelithiasis and dilated common bile duct without evidence of acute cholecystitis. [Urgent Result: Small bowel obstruction transition point located in the left upper quadrant.] Finding was identified on 12/6/2021 7:44 PM. Dr. Ma was contacted by Dr. Berman at 12/6/2021 8:02 PM and verbalized understanding of the urgent finding. I have personally reviewed the examination and initial interpretation and I agree with the findings. KATELYN SILVA,    SYSTEM ID:  [id number]

## 2021-12-07 NOTE — CONSULTS
Diabetes/Hyperglycemia Management Consult    Chief Complaint Abdominal pain  Consult requested by: Dr Ridley  History of Present Illness   41 year old female with PMHx of metastatic gastric adenocarcinoma with liver and ovarian metastases and peritoneal carcinomatosis (diagnosed 7/2021, on treatment with FOLFOX plus trastuzumab and keytruda since 8/25/21). She is admitted for partial SBO being managed conservatively. Diabetes is consulted for autoimmune DM management    Pertinent History:   She was admitted to [facility] on 11/23/2021 after presenting to [facility] ED with increased thirst, dizziness, hot flashes, nausea with vomiting, and abdominal pain that onset the week of admission. She also endorsed weight loss and fatigue from chemotherapy. Her last steroid treatment was 11/8, but she remains on chemo treatment. She had her Pembrolizumab and Herceptin infusion 11/22 where she had labs done. Outside labs with significant hyperglycemia . In the ED, BG was 423, beta hydroxybutyrate 4.3, and HgbA1c 7.2. Anion gap 13. 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 She was discharged on Lantus 15 units daily with prandial insulin sliding scale and daily NPH when she is getting steroids. Freestyle Ronn Sensor was placed.     She reports that she has been doing lantus 15 units at night and would get low BG overnight, she also was doing 1: 10 for carb coverage with melas and snacks and 1/50 above 150 mg/dl TID AC , she wasn't taking much for at bedtime as she would eat late and she would just do her dinnertime at that point. She feels low as being sweaty and feeling off. She also mentions she is anxious that she will go low  As sometimes she would give herself humalog and then not being able to eat      She also  mentions she has vision changes since being on chemotherapy, no visual field defects, no headaches, has nausea, abdominal distension from the SBO, denies any change in voice, difficulty swallowing, pain in anterior neck, tremors, palpitations, has been having heavy periods when on chemotherapy, uses lorazepam/ Marijuana  for sleep. She denies any excessive thirst or excessive urination. She reports having dry mouth after chemo. She also reports having intermittent tingling and numbness in her finger after starting chemotherapy.  Dneies any light headedness. She also gets decadron with her chemotherapy and uses NPH 5 units that day. No purple striae or bruises        Recent Labs   Lab 12/07/21  0852 12/07/21  0752 12/07/21  0508 12/07/21  0251 12/06/21  1538   * 256* 298* 292* 167*         Diabetes Type: Autoimmune type 1 DM 2/2 to keytruda  Diabetes Duration: 2-3 weeks  Usual Diabetes Regimen:   Continuous BG monitoring frequency - david  NPH 5 units prn with steroids for chemo   Lantus 15 units at bedtime  Lispro 1: 10 gm CHO with meals and snacks and 1/50 above 150 TIC AC and QHS      Ability to [place] Prescribed Regimen: yes  Diabetes Control:   Lab Results   Component Value Date    A1C 7.1 12/07/2021     Diabetes Complications: unknown  History of DKA: yes on diagnosis in 11/2021  Able to Detect Hypoglycemia: yes  Usual Diabetes Care Provider: Carly Lopez  Factors Impacting Glucose Control: Diet changes    FH:No history of diabetes or thyroid disorder    SH: Doesn't drink alcohol anymore, doesn't smoke cigarettes, uses marijuana for the chemotherapy but has stopped it recently    Review of Systems  10 point ROS completed with pertinent positives and negatives noted in the HPI    Past medical, family and social histories are reviewed and updated.    Past Medical History  No past medical history on file.    Family History  No family history on file.       Social History  Social History  "    Socioeconomic History     Marital status:      Spouse name: Not on file     Number of children: Not on file     Years of education: Not on file     Highest education level: Not on file   Occupational History     Not on file   Tobacco Use     Smoking status: Never Smoker     Smokeless tobacco: Never Used   Substance and Sexual Activity     Alcohol use: Not on file     Drug use: Not on file     Sexual activity: Not on file   Other Topics Concern     Not on file   Social History Narrative     Not on file     Social Determinants of Health     Financial Resource Strain: Not on file   Food Insecurity: Not on file   Transportation Needs: Not on file   Physical Activity: Not on file   Stress: Not on file   Social Connections: Not on file   Intimate Partner Violence: Not on file   Housing Stability: Not on file         Physical Exam  /69 (BP Location: Left arm)   Pulse 73   Temp 97.6  F (36.4  C) (Temporal)   Resp 18   Ht 1.6 m (5' 3\")   Wt 42.2 kg (93 lb)   LMP 11/06/2021   SpO2 98%   BMI 16.47 kg/m      General:  pleasant  resting in bed, in no distress.   HEENT:NC/AT, PER and anicteric, non-injected, oral mucous membranes moist. No thyromegaly or LN. No stare  Lungs: unlabored respiration,NGT  ABD: Soft, non distended  Skin: warm and dry, no obvious lesions  MSK:  fluid movement of all extremities  Lymp: no LE edema   Mental status: alert, oriented x3, communicating clearly, no tremors on outstretched hands  Psych: calm, even mood    Laboratory    Recent Labs   Lab Test 12/07/21  0852 12/07/21  0752 12/07/21  0251 12/06/21  1538   NA  --  135  --  136   POTASSIUM  --  4.0  --  3.6   CHLORIDE  --  100  --  102   CO2  --  26  --  26   ANIONGAP  --  9  --  8   * 256*   < > 167*   BUN  --  13  --  11   CR  --  0.60  --  0.47*   JORGE L  --  8.9  --  8.9    < > = values in this interval not displayed.     CBC RESULTS:   Recent Labs   Lab Test 12/07/21  0752   WBC 5.4   RBC 3.30*   HGB 9.7*   HCT " 29.9*   MCV 91   MCH 29.4   MCHC 32.4   RDW 15.0          Liver Function Studies -   Recent Labs   Lab Test 12/06/21  1538   PROTTOTAL 6.7*   ALBUMIN 3.0*   BILITOTAL 0.3   ALKPHOS 84   AST 28   ALT 65*     Narrative  Performed by [id number]  EXAM:  PET MR ABDOMEN FOCUSED WITHOUT AND WITH IV CONTRAST     COMPARISON:  CT performed 07/24/2021.     Imaging performed in conjunction with a survey PET/MRI of the body. For further   details regarding the PET portion of this exam, and all findings outside of the   abdomen please see separate report.     FINDINGS:  Thickening of the gastric body is consistent with the history of   gastric carcinoma. Moderate distention of the stomach.     Approximately 2 cm hypoenhancing lesion in the posterior right hepatic dome   (series 14 image 7) is likely a metastasis.   A smaller 5 mm lesion more anteriorly in the hepatic dome (series 14 image 6 is   incompletely characterized, but is probably a cyst or hemangioma.   At least 2 small enhancing implants along the lateral margin of the right   hepatic lobe (series 7 images 45 and 71).   Patent portal and hepatic veins.     Nodular thickening and enhancement of the omentum was better visualized on the   previous CT, and is suspicious for carcinomatosis. Enhancement and mild soft   tissue thickening along the midline incision in the anterior abdominal wall.     Small-moderate abdominal pelvic ascites. Partially visualized pelvic masses with   corresponding PET activity. Very small bilateral pleural effusions. Kidneys,   pancreas, and adrenals are unremarkable. Cholelythiasis. Bilateral breast   implants. Uterine fibroid. Sacral Tarlov cysts. Drain in the upper abdomen.    EXAM: PET MR SKULL TO THIGH SURVEY FDG     Serum glucose at time of F-18 FDG injection was 76 mg/dL.     RADIOPHARMACEUTICAL/MEDS:   Route: intravenous   fludeoxyglucose F 18 injection USP (FDG F-18),10.76 millicurie     TECHNIQUE: F-18 FDG PET/MR scan was  performed from the vertex through the thighs   with MR fusion imaging for attenuation correction, anatomic coregistration and   respiratory gating only, with imaging beginning at approximately 60 minutes   after radiotracer injection.     COMPARISON: None     Imaging performed in conjunction with focused PET/MRI. For further details   regarding the focused MRI portion of this exam please see separate dictated   report.     INDICATION: Evaluation for gastric cancer. Status post exploratory laparotomy,   patch repair of a perforated gastric ulcer, excision of peritoneal and omental   nodules 07/24/2021. Initial treatment strategy.     FINDINGS:     Hypermetabolic wall thickening at the medial aspect of the stomach likely   correlates with the known gastric neoplasm (SUV max 13.5).     Hypermetabolic multifocal nodules in the perihepatic,  mesenteric, omental and   pelvic regions likely represent peritoneal implants.     There is a 6.2 cm mass in the right pelvis with avid FDG uptake (SUV max 24.8,   PET image 279), suspicious for right ovarian metastasis.     An enlarged nodular uterus measuring about 10 cm shows no elevated FDG uptake.   These are presumably exuberant fibroids of the greater volume than typically   observed in an individual of this age.     A hypermetabolic 1.6 cm mass in hepatic segment 7 is likely hepatic metastasis   (SUV max 10.6). A 0.4 cm lesion in hepatic segment 8 with peripherally   enhancement on comparison CT shows no abnormal FDG uptake, likely hepatic   hemangioma (PET image 170).     Postoperative change in the midline anterior abdomen with mild FDG uptake.     Subtle activity in several bilateral internal mammary nodes may be reactive.     Injection related tracer uptake in the left forearm.     Noted on nondiagnostic quality survey MRI images of the torso: Breast implants.    Procedure Note      Active Medications  Current Facility-Administered Medications   Medication      acetaminophen (TYLENOL) tablet 650 mg     glucose gel 15-30 g    Or     dextrose 50 % injection 25-50 mL    Or     glucagon injection 1 mg     enoxaparin ANTICOAGULANT (LOVENOX) injection 40 mg     HYDROmorphone (PF) (DILAUDID) injection 0.5 mg     insulin aspart (NovoLOG) injection (RAPID ACTING)     insulin glargine (LANTUS PEN) injection 5 Units     lactated ringers infusion     ondansetron (ZOFRAN) injection 4 mg     prochlorperazine (COMPAZINE) tablet 5 mg    Or     prochlorperazine (COMPAZINE) injection 5 mg     No current outpatient medications on file.       Current Diet  Orders Placed This Encounter      NPO for Medical/Clinical Reasons Except for: Meds        Assessment  41 year old female with PMHx of metastatic gastric adenocarcinoma with liver and ovarian metastases and peritoneal carcinomatosis (diagnosed 7/2021, on treatment with FOLFOX plus trastuzumab and keytruda since 8/25/21). She is admitted for partial SBO being managed conservatively      # Diabetes Mellitus from ICI therapy likely Autoimmune DM    Plan  - Hb A1c: 7.2  - Recommend lantus 12 units daily - ordered  - Recommend aspart 1: 50 above 140 mg/dl TID AC, at bedtime- ordered  - BG checks Q 4hrs while NPO- ordered   - If started on diet , recommend 1: 20 gm CHO coverage with meals and snacks PRN  - Hypoglycemia protocol  - Check BRYANT, insulin Ab, islet cell Ab -  Ordered for you    - Education needs identified: TBD  - Prescriptions needed on discharge:TBD  - Outpatient follow up: Dr Lopez at [facility]    # Monitor Endocrinopathies from ICI therapy  - 11/24- TSH: 1.28 and free T4: 1.13  - 11/24: IGF-1 :105  - 11/24 Cortisol : 10.1, ACTH: 8  - 11/24 Prolactin 13.3  - 12/7: Na and K : Within normal range      Patient seen and examined , plan of care discussed with staff endocrinologist Dr Ronnie Pompa MD.  Endocrinology fellow  Pager [phone]    To contact Endocrine Diabetes service:   From 8AM-4PM: page  inpatient diabetes provider that is following the patient that day (see filed or incomplete progress notes/consult notes).  If uncertain of provider assignment: page job code [phone] or review in [facility].  For questions or updates from 4PM-8AM: page the diabetes job code for on call fellow: [phone]    Please notify inpatient diabetes service if changes are planned to steroids/enteral feeding/parenteral feeding, or if procedures are planned requiring prolonged NPO status.    I was present with the fellow who participated in the service and in the documentation of the note. I have verified the history and personally performed the physical exam and medical decision making. I agree with the assessment and plan of care as documented in the note.     Josh Trujillo MD  Division of Diabetes and Endocrinology  Department of Medicine  [phone]

## 2021-12-07 NOTE — PHARMACY-ADMISSION MEDICATION HISTORY
Admission Medication History Completed by Pharmacy    See [facility] Admission Navigator for allergy information, preferred outpatient pharmacy, prior to admission medications and immunization status.     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    Prior to Admission medications    Medication Sig Last Dose Taking? Auth Provider   insulin glargine (LANTUS PEN) 100 UNIT/ML pen Inject 15 Units Subcutaneous At Bedtime  Yes Unknown, Entered By History   insulin lispro (HUMALOG KWIKPEN) 100 UNIT/ML (1 unit dial) KWIKPEN Inject 0-30 Units Subcutaneous 3 times daily (before meals) Per sliding scale, Max TDD = 45 units  Yes Unknown, Entered By History   insulin  UNIT/ML injection Inject 5 Units Subcutaneous daily as needed for other (Take with steroid dose prior to chemotherapy)  Yes Unknown, Entered By History   LORazepam (ATIVAN) 0.5 MG tablet Take 0.5 mg by mouth every 4 hours as needed for anxiety  Yes Unknown, Entered By History   acetaminophen 500 MG CAPS Take 1,000 mg by mouth   Reported, Patient   pantoprazole (PROTONIX) 40 MG EC tablet    Reported, Patient       Date completed: 12/07/21    Medication history completed by: Kendra Greer, PharmD

## 2021-12-07 NOTE — PLAN OF CARE
Time:      Reason for Admission: SBO    Activity: Independent    Neuro: A&O x4. Calm and cooperative.     GI/: Voiding spontaneously.     Diet: NPO    Incisions/Drains: NG in place to LIS.     IV Access: R Port infusing LR at 125mL/hr.     Labs: B and 298.    Vitals: VSS on RA    Pain: Pt reporting pain in throat due to NG. PRN IV morphine given x1.     New changes this shift: Pt admitted to unit around 0030. 2 RN skin assessment completed.     Plan: Continue with plan of care.

## 2021-12-07 NOTE — PLAN OF CARE
Pt has been NPO with NG tube in with LIS for decompression. Pt reported LBM was yesterday, passing flatus, VSS, mild abd pain intermittently rated pain at 2/10, no pain med needed. There was very minimal amt of output from NG this shift. Pt was clamped for a period of 1 hr this am for ambulation with , denied any increased abd pain or nausea while NG was clamped. Medicine put in order to clamp NG for 2 hrs, starting at 1350, if no nausea or abd pain, then NG can be removed and nursing staff will page Medicine team for diet order. Although NPO, BG values up in the 200s. Insulin administered per protocol. Pt has been up ambulating multiple times outside of room. Stable, able to make needs known.

## 2021-12-08 LAB
ANION GAP SERPL CALCULATED.3IONS-SCNC: 7 MMOL/L (ref 3–14)
BASOPHILS # BLD AUTO: 0 10E3/UL (ref 0–0.2)
BASOPHILS NFR BLD AUTO: 0 %
BUN SERPL-MCNC: 6 MG/DL (ref 7–30)
CALCIUM SERPL-MCNC: 8.6 MG/DL (ref 8.5–10.1)
CHLORIDE BLD-SCNC: 104 MMOL/L (ref 94–109)
CO2 SERPL-SCNC: 27 MMOL/L (ref 20–32)
CREAT SERPL-MCNC: 0.56 MG/DL (ref 0.52–1.04)
EOSINOPHIL # BLD AUTO: 0.1 10E3/UL (ref 0–0.7)
EOSINOPHIL NFR BLD AUTO: 2 %
ERYTHROCYTE [DISTWIDTH] IN BLOOD BY AUTOMATED COUNT: 14.7 % (ref 10–15)
GFR SERPL CREATININE-BSD FRML MDRD: >90 ML/MIN/1.73M2
GLUCOSE BLD-MCNC: 132 MG/DL (ref 70–99)
GLUCOSE BLDC GLUCOMTR-MCNC: 133 MG/DL (ref 70–99)
GLUCOSE BLDC GLUCOMTR-MCNC: 135 MG/DL (ref 70–99)
GLUCOSE BLDC GLUCOMTR-MCNC: 155 MG/DL (ref 70–99)
GLUCOSE BLDC GLUCOMTR-MCNC: 242 MG/DL (ref 70–99)
GLUCOSE BLDC GLUCOMTR-MCNC: 77 MG/DL (ref 70–99)
HCT VFR BLD AUTO: 28.7 % (ref 35–47)
HGB BLD-MCNC: 9.2 G/DL (ref 11.7–15.7)
IMM GRANULOCYTES # BLD: 0 10E3/UL
IMM GRANULOCYTES NFR BLD: 1 %
LYMPHOCYTES # BLD AUTO: 1 10E3/UL (ref 0.8–5.3)
LYMPHOCYTES NFR BLD AUTO: 19 %
MCH RBC QN AUTO: 29.4 PG (ref 26.5–33)
MCHC RBC AUTO-ENTMCNC: 32.1 G/DL (ref 31.5–36.5)
MCV RBC AUTO: 92 FL (ref 78–100)
MONOCYTES # BLD AUTO: 0.3 10E3/UL (ref 0–1.3)
MONOCYTES NFR BLD AUTO: 7 %
NEUTROPHILS # BLD AUTO: 3.5 10E3/UL (ref 1.6–8.3)
NEUTROPHILS NFR BLD AUTO: 71 %
NRBC # BLD AUTO: 0 10E3/UL
NRBC BLD AUTO-RTO: 0 /100
PLATELET # BLD AUTO: 239 10E3/UL (ref 150–450)
POTASSIUM BLD-SCNC: 3.7 MMOL/L (ref 3.4–5.3)
RBC # BLD AUTO: 3.13 10E6/UL (ref 3.8–5.2)
SODIUM SERPL-SCNC: 138 MMOL/L (ref 133–144)
WBC # BLD AUTO: 4.9 10E3/UL (ref 4–11)

## 2021-12-08 PROCEDURE — 36591 DRAW BLOOD OFF VENOUS DEVICE: CPT | Performed by: HOSPITALIST

## 2021-12-08 PROCEDURE — 86341 ISLET CELL ANTIBODY: CPT | Performed by: STUDENT IN AN ORGANIZED HEALTH CARE EDUCATION/TRAINING PROGRAM

## 2021-12-08 PROCEDURE — 120N000002 HC R&B MED SURG/OB UMMC

## 2021-12-08 PROCEDURE — 99232 SBSQ HOSP IP/OBS MODERATE 35: CPT | Performed by: INTERNAL MEDICINE

## 2021-12-08 PROCEDURE — 85004 AUTOMATED DIFF WBC COUNT: CPT | Performed by: HOSPITALIST

## 2021-12-08 PROCEDURE — 258N000003 HC RX IP 258 OP 636: Performed by: HOSPITALIST

## 2021-12-08 PROCEDURE — 99233 SBSQ HOSP IP/OBS HIGH 50: CPT | Performed by: STUDENT IN AN ORGANIZED HEALTH CARE EDUCATION/TRAINING PROGRAM

## 2021-12-08 PROCEDURE — 82374 ASSAY BLOOD CARBON DIOXIDE: CPT | Performed by: HOSPITALIST

## 2021-12-08 PROCEDURE — 250N000011 HC RX IP 250 OP 636: Performed by: HOSPITALIST

## 2021-12-08 PROCEDURE — 258N000003 HC RX IP 258 OP 636: Performed by: STUDENT IN AN ORGANIZED HEALTH CARE EDUCATION/TRAINING PROGRAM

## 2021-12-08 PROCEDURE — 86337 INSULIN ANTIBODIES: CPT | Performed by: STUDENT IN AN ORGANIZED HEALTH CARE EDUCATION/TRAINING PROGRAM

## 2021-12-08 RX ADMIN — ENOXAPARIN SODIUM 40 MG: 40 INJECTION SUBCUTANEOUS at 09:36

## 2021-12-08 RX ADMIN — SODIUM CHLORIDE, POTASSIUM CHLORIDE, SODIUM LACTATE AND CALCIUM CHLORIDE: 600; 310; 30; 20 INJECTION, SOLUTION INTRAVENOUS at 15:44

## 2021-12-08 RX ADMIN — SODIUM CHLORIDE, POTASSIUM CHLORIDE, SODIUM LACTATE AND CALCIUM CHLORIDE: 600; 310; 30; 20 INJECTION, SOLUTION INTRAVENOUS at 06:23

## 2021-12-08 ASSESSMENT — ACTIVITIES OF DAILY LIVING (ADL)
ADLS_ACUITY_SCORE: 9
ADLS_ACUITY_SCORE: 9
ADLS_ACUITY_SCORE: 5
DOING_ERRANDS_INDEPENDENTLY_DIFFICULTY: NO
ADLS_ACUITY_SCORE: 5
WEAR_GLASSES_OR_BLIND: NO
ADLS_ACUITY_SCORE: 9
DIFFICULTY_COMMUNICATING: NO
ADLS_ACUITY_SCORE: 5
ADLS_ACUITY_SCORE: 9
ADLS_ACUITY_SCORE: 9
PATIENT_/_FAMILY_COMMUNICATION_STYLE: SPOKEN LANGUAGE (ENGLISH OR BILINGUAL)
HEARING_DIFFICULTY_OR_DEAF: NO
ADLS_ACUITY_SCORE: 9
ADLS_ACUITY_SCORE: 5
ADLS_ACUITY_SCORE: 9
ADLS_ACUITY_SCORE: 5
ADLS_ACUITY_SCORE: 5
ADLS_ACUITY_SCORE: 9
FALL_HISTORY_WITHIN_LAST_SIX_MONTHS: NO
DRESSING/BATHING_DIFFICULTY: NO
ADLS_ACUITY_SCORE: 9
WALKING_OR_CLIMBING_STAIRS_DIFFICULTY: NO
CONCENTRATING,_REMEMBERING_OR_MAKING_DECISIONS_DIFFICULTY: NO
ADLS_ACUITY_SCORE: 9
ADLS_ACUITY_SCORE: 5
ADLS_ACUITY_SCORE: 9
ADLS_ACUITY_SCORE: 9
DIFFICULTY_EATING/SWALLOWING: NO
ADLS_ACUITY_SCORE: 5
TOILETING_ISSUES: NO
ADLS_ACUITY_SCORE: 5

## 2021-12-08 NOTE — PROGRESS NOTES
[facility]    Medicine Progress Note - Hospitalist Service, Gold 8       Date of Admission:  12/6/2021    Assessment & Plan          Shira Joy is a 41 year old female admitted on 12/6/2021. She has PMHx of metastatic gastric adenocarcinoma with liver and ovarian metastases and peritoneal carcinomatosis (diagnosed 7/2021, on treatment with FOLFOX plus trastuzumab and keydtruda since 8/25/21). She is admitted with abdominal pain.      #Small Mitali Obstruction  #Gastric Adenocarcinoma with metastases to liver and ovaries, peritoneal carcinomatosis  #Abdominal pain, improving  CT abdomen showed distal small bowel obstruction with transition point in the right lower quadrant, potentially due to extrinsic narrowing of the small bowel related to peritoneal disease. Cholelithiasis and dilated common bile duct without evidence of acute cholecystitis. Lactic acid normal. Hemodynamically stable. Surgery recommendation for conservative management currently.  -Consulted surgery, appreciate recs   -Advancing diet as tolerated  -Consult oncology, appreciate recs   -No changes and will need to follow-up with [facility] oncology for ongoing management  -No surgical management at this time  - IVF: Decrease LR to 75 cc/h, may be able to further decrease once p.o. improves   -NG tube removed  - Serial abdominal exam  -Tylenol as needed pain    #DM  Likely autoimmune DM1 given concurrent pembrolizumab treatment and low C-Peptide , slender body habitus. A/W autoimmune related hypothalamic, adrenal, thyroid, diabetes.  Normal adrenal, thyroid labs on last admission at [facility]. Discharged on lantus 15 once daily at bedtime, humalog ICR 10, ISF 50, NPH 5 units PRN with steroids chemotherapy. Patient has CGM and gets low alarms.  -Consult endocrinology, appreciate recs  -Continue Lantus 12 units daily  -Continue sliding scale insulin  -Continue 1 unit per 20 g of carbohydrate coverage  -Follow  insulin, BRYANT, and islet cell antibodies  -Hypoglycemia protocol    Underweight  Severe malnutrition  BMI of 16.47 likely in setting of her cancer, will get nutrition consult.       Diet: Consistent Carbohydrate Diet Moderate Consistent Carb (60 g CHO per Meal) Diet    DVT Prophylaxis: Enoxaparin (Lovenox) SQ  Magana Catheter: Not present  Central Lines: None  Code Status: Full Code      Disposition Plan   Expected Discharge: 12/09/2021     Anticipated discharge location:  Awaiting care coordination huddle  Delays:   improvement in abdominal pain and tolerating PO        The patient's care was discussed with the Patient and Oncology Team.    Moe Ridley MD  Hospitalist Service, [address]  Securely message with the Vocera Web Console (learn more here)  Text page via [employer] Paging/Directory    Please see sign in/sign out for up to date coverage information    Clinically Significant Risk Factors Present on Admission                 ______________________________________________________________________    Interval History   Patient is doing okay overall.  She did have some overnight pain/discomfort on the left side of her abdomen after eating.  No fever or chills.  No chest pain or shortness of breath.  Patient is passing flatus and had a bowel movement (note bowel movement did not improve left-sided pain/discomfort).  No current nausea vomiting.  Patient would like to stay in hospital overnight to ensure that her abdominal pain improves.  She is okay with advancing to full liquid diet and potentially regular diet later 12/8.  Four-point ROS otherwise negative.    Data reviewed today: I reviewed all medications, new labs and imaging results over the last 24 hours. I personally reviewed no images or EKG's today.    Physical Exam   Vital Signs: Temp: 97.1  F (36.2  C) Temp src: Temporal BP: 133/81 Pulse: 70   Resp: 16 SpO2: 99 % O2 Device: None (Room air)    Weight:  93 lbs 0 oz  General Appearance: Pleasant, NAD, cachectic   Respiratory: CTA B/L, no crackles or wheezing  Cardiovascular: S1, S2, no gallop  GI: Soft, mild generalized tenderness on palpation, no rebound or guarding, bowel sounds present  Skin: No rashes  Musculoskeletal: Normal  Neurologic: Gross motor normal  Psychiatric: AAA X 3, normal mood and affect    Data   Recent Labs   Lab 12/08/21  0805 12/08/21  0743 12/08/21  0220 12/07/21  0852 12/07/21  0752 12/07/21  0251 12/06/21  1538   WBC  --  4.9  --   --  5.4  --  8.7   HGB  --  9.2*  --   --  9.7*  --  10.7*   MCV  --  92  --   --  91  --  89   PLT  --  239  --   --  283  --  304   INR  --   --   --   --   --   --  1.04   NA  --  138  --   --  135  --  136   POTASSIUM  --  3.7  --   --  4.0  --  3.6   CHLORIDE  --  104  --   --  100  --  102   CO2  --  27  --   --  26  --  26   BUN  --  6*  --   --  13  --  11   CR  --  0.56  --   --  0.60  --  0.47*   ANIONGAP  --  7  --   --  9  --  8   JORGE L  --  8.6  --   --  8.9  --  8.9   * 132* 135*   < > 256*   < > 167*   ALBUMIN  --   --   --   --   --   --  3.0*   PROTTOTAL  --   --   --   --   --   --  6.7*   BILITOTAL  --   --   --   --   --   --  0.3   ALKPHOS  --   --   --   --   --   --  84   ALT  --   --   --   --   --   --  65*   AST  --   --   --   --   --   --  28   LIPASE  --   --   --   --   --   --  113    < > = values in this interval not displayed.     No results found for this or any previous visit (from the past 24 hour(s)).  Medications     lactated ringers 125 mL/hr at 12/08/21 0623       enoxaparin ANTICOAGULANT  40 mg Subcutaneous Q24H     insulin aspart  1-7 Units Subcutaneous TID AC     insulin aspart  1-5 Units Subcutaneous At Bedtime     insulin aspart   Subcutaneous QAM AC     insulin aspart   Subcutaneous Daily with lunch     insulin aspart   Subcutaneous Daily with supper     insulin glargine  12 Units Subcutaneous QAM AC

## 2021-12-08 NOTE — PROGRESS NOTES
CLINICAL NUTRITION SERVICES - ASSESSMENT NOTE     Nutrition Prescription    RECOMMENDATIONS FOR MDs/PROVIDERS TO ORDER:  Continue to encourage PO intakes    Malnutrition Status:    Severe malnutrition in the context of acute on chronic illness    Recommendations already ordered by Registered Dietitian (RD):  None at this time    Future/Additional Recommendations:  Continue to monitor Po adequacy and need to begin supplements/calorie counts as appropriate      REASON FOR ASSESSMENT  Shira Joy is a/an 41 year old female assessed by the dietitian for Provider Order - underweight, metastatic cancer    PMH: She has PMHx of metastatic gastric adenocarcinoma with liver and ovarian metastases and peritoneal carcinomatosis (diagnosed 7/2021, on treatment with FOLFOX plus trastuzumab and keydtruda since 8/25/21). She is admitted with abdominal pain.     NUTRITION HISTORY  GI: CT abdomen showed distal small bowel obstruction with transition point in the right lower quadrant, potentially due to extrinsic narrowing of the small bowel related to peritoneal disease. Advancing diet as tolerated. NG tube removed. No surgical management at this time. DM1- Endocrine following.    Patient reports her UBW was always around 112# and she has been this weight and stature for her entire life. She tates she has always been on the lower end of a normal BMI. She lost about 5# prior to her cancer diagnosis but was not very concerned about this at the time. She was then diagnosed and began chemo and she lost another 5# and then another 5# and reports her lowest weight has been 89#. At this time she was then diagnosed weight DM1, which she was unware of. She states she would lose weight and then gain it back between chemo and would try to aim for a weight of 99# and ~ 2000 kcals/day. States she was drinking supplements, smoothies, kefir, oils, protein bars, eggs, meat, ect. She was able to get her weight back upto 97# after she began  "insulin , but then it dropped again prior to this admissiom. She was going OK with her weight and then began having abdominal pain and was admitted with a SBO. It has currently resolved and she was able to consume food today with no nausea and was able to stool/pass gas.       CURRENT NUTRITION ORDERS  Diet: Consistent Carbohydrate Diet Moderate Consistent Carb (60 g CHO per Meal) Diet    Intake/Tolerance:   - Patient had cream of wheat for breakfast. Tolerating so far.  - Bacons, eggs, no nausea     LABS  Labs reviewed    MEDICATIONS  Medications reviewed  -Continue Lantus 12 units daily  -Continue sliding scale insulin  -Continue 1 unit per 20 g of carbohydrate coverage  -Follow insulin, BRYANT, and islet cell antibodies    ANTHROPOMETRICS  Height: 160 cm (5' 3\")  Most Recent Weight: 42.2 kg (93 lb)    IBW: 52.3 kg  BMI: Underweight BMI <18.5  Weight History: 9% wt loss in 4 months. Weight fluctuations per patient 2/2 to chemo, new DM1 and a SBO all hindering PO intakes.   Wt Readings from Last 10 Encounters:   12/06/21 42.2 kg (93 lb)   08/13/21 46.6 kg (102 lb 12.8 oz)     Dosing Weight: 42 kg (actual)     ASSESSED NUTRITION NEEDS  Estimated Energy Needs: 8654-0849 kcals/day (35 - 40 kcals/kg)  Justification: Increased needs, Repletion and Underweight  Estimated Protein Needs: 63-84 grams protein/day (1.5 - 2 grams of pro/kg)  Justification: Hypercatabolism with critical illness, Increased needs and Repletion  Estimated Fluid Needs: 1 mL/kcal/day   Justification: Maintenance and Per provider pending fluid status    PHYSICAL FINDINGS  See malnutrition section below.    MALNUTRITION  % Intake: < 75% for >/= 3 months (moderate)  % Weight Loss: > 7.5% in 3 months (severe)  Subcutaneous Fat Loss: Facial region:  mild, Upper arm:  mild and Lower arm:  mild  Muscle Loss: Temporal:  mild, Scapular bone:  mild, Thoracic region (clavicle, acromium bone, deltoid, trapezius, pectoral):  Mild-moderate, Upper arm (bicep, " tricep):  moderate, Lower arm  (forearm):  moderate, Upper leg (quadricep, hamstring):  moderate, Patellar region:  moderate and Posterior calf:  mild  Fluid Accumulation/Edema: None noted  Malnutrition Diagnosis: Severe malnutrition in the context of acute on chronic illness    NUTRITION DIAGNOSIS  Inadequate protein-energy intake related to chemotherapy, new DM1 diagnosis and SBO inhibiting PO intakes as evidenced by patient report and underweight BMI.       INTERVENTIONS  Implementation  Nutrition Education: Provided education on role of RD and nutrition POC.    Nutrition education for nutrition relationship to health/disease- provided handouts on ways to increase kcals/pro and a list of supplements     Goals  Patient to consume % of nutritionally adequate meal trays TID, or the equivalent with supplements/snacks.     Monitoring/Evaluation  Progress toward goals will be monitored and evaluated per protocol.      Samantha Huber RD, MS, LD  SICU: [id number]

## 2021-12-08 NOTE — PLAN OF CARE
Patient had cream of wheat for breakfast with brown sugar carb covered then saved her apple juice and pudding. Had some bites of pudding and apple juice when lunch sugar was taken 20 minutes before that is why her sugar was higher. Patient just covered for her sliding scale and will cover carbs if she eats any. Patient feels full no nausea or pain. Up independent in room . Possible discharge tomorrow.

## 2021-12-08 NOTE — PROGRESS NOTES
"Diabetes Progress Note  Patient: Shira Joy   MRN: [id number]  Date of Service: 12/08/2021    Subjective:  No acute events overnight, patient tolerating diet well, BG overnight were better on lantus 12 units, today  At 11:3 5 Am her BG in 230 after she got 1 unit(s) for cream of wheat and then she had 3/4 apple juice and pudding just before the BG check        Physical Examination:  BP (!) 134/95 (BP Location: Left arm)   Pulse 83   Temp 98.2  F (36.8  C) (Temporal)   Resp 16   Ht 1.6 m (5' 3\")   Wt 42.2 kg (93 lb)   LMP 11/06/2021   SpO2 98%   BMI 16.47 kg/m    General:  pleasant  resting in bed, in no distress.   HEENT:NC/AT, PER and anicteric, non-injected, oral mucous membranes moist. No stare  Lungs: unlabored respiration  ABD: Non distended  Skin:no obvious lesions  MSK:  free movement of all extremities  Lymp: no LE swelling  Mental status: alert, oriented x3, communicating clearly  Psych: calm, even mood    Medications:  Reviewed    Endocrine Labs:  reviewed    Assessment and plan:  Shira Joy is a 41 year old female with PMHx of metastatic gastric adenocarcinoma with liver and ovarian metastases and peritoneal carcinomatosis (diagnosed 7/2021, on treatment with FOLFOX plus trastuzumab and keytruda since 8/25/21). She is admitted for partial SBO being managed conservatively        Diabetes Mellitus from ICI therapy likely autoimmune DM     Plan  - HbA1c 7.2  - Recommend Lantus 12 units daily   - Recommend Aspart 1: 50 above 140 mg/dl TID AC and above 200 mg/dl at bedtime  -  Increase Aspart 1: 15 gm CHO coverage with meals and snacks PRN   - Hypoglycemia protocol  - BG checks  TID ACHS- ordered   - Check BRYANT, insulin Ab, islet cell Ab -  pending result     - Education needs identified:  none  - Prescriptions needed on discharge: Same as current regimen inpatient  - Outpatient follow up: Dr Lopez at [facility]     # Monitor Endocrinopathies from ICI therapy  - 11/24 TSH: 1.28 and free T4: " 1.13  - 11/24 IGF-1 :105  - 11/24 Cortisol : 10.1, ACTH: 8  - 11/24 Prolactin 13.3  - 12/7: Na and K within normal range      Patient seen and examined , plan of care discussed with staff endocrinologist Dr Ronnie Pompa MD.  Endocrinology fellow  Pager [phone]    I was present with the fellow who participated in the service and in the documentation of the note. I have verified the history and personally performed the physical exam and medical decision making. I agree with the assessment and plan of care as documented in the note.     Josh Trujillo MD  Division of Diabetes and Endocrinology  Department of Medicine

## 2021-12-08 NOTE — PROGRESS NOTES
"General Surgery Progress Note  12/8/2021    No acute events overnight. Tolerated clears after NG tube removal yesterday. Had an episode of cramping abdominal pain this morning after having a bowel movement. Passing gas. Denies nausea/vomiting.    /81 (BP Location: Left arm)   Pulse 70   Temp 97.1  F (36.2  C) (Temporal)   Resp 16   Ht 1.6 m (5' 3\")   Wt 42.2 kg (93 lb)   LMP 11/06/2021   SpO2 99%   BMI 16.47 kg/m    NAD, comfortable appearing  Non-labored breathing on room air  Nontachycardic  Abdomen soft, mildly tender at LLQ, distended loop of bowel is palpable in this area, otherwise nontender  Moves all extremities, no edema    Shira Joy is a 41 year old female with metastatic gastric adenocarcinoma with concern for bowel obstruction. Now having antegrade function and tolerating clear liquids.  -- Regular diet as tolerated, patient will attempt bites at first  -- Stop IVF when taking adequate PO  -- Encourage ambulation  -- Ok to discharge once tolerating diet    Romie Georges MD  PGY-5, General Surgery    "

## 2021-12-08 NOTE — PLAN OF CARE
"Time: 1900-0730.  Reason for admission: Abdominal pain.   VS: /72 (BP Location: Left arm)   Pulse 64   Temp 97.8  F (36.6  C) (Temporal)   Resp 16   Ht 1.6 m (5' 3\")   Wt 42.2 kg (93 lb)   LMP 11/06/2021   SpO2 100%   BMI 16.47 kg/m      Activity: Ind.   Neuros: A&Ox4. Denied N/T.   Cardiac: VSS. No edema. Denied chest pain.   Respiratory: LS clear. Sating 100% on RA denied cough or SOB.   GI/: Voids with no difficulty. Abd contour irregular. Old scars noted. Soft. +BS. +flatus. LBM 12/8. Denied N/V. Pt had a NG tube. NG was removed on 12/7.   Diet: Clear liquid. Pt is tolerating diet well.   Skin: Intact.   Lines: R chset wall port infusing NS.   Labs/Ednocrine: Reviewed. No replacement noted. - Lantus 7 units and Novolog carb coverage given with 2 units. HS . No sliding scale administered.   New changes this shift/Plan: Continue to follow POC and notify MD with changes.   Will continue to monitor & follow POC.     "

## 2021-12-08 NOTE — PROGRESS NOTES
BRIEF ONCOLOGY NOTE:    Ms. Shira Joy is a 42yo with metastatic gastric adenocarcinoma with metastases to the liver, ovaries, and peritoneum most recently treated with FOLFOX + trastuzumab + pembrolizumab who presented with progressive abdominal pain and was found to have evidence of partial malignant SBO which resolved with conservative management. NG tube removed yesterday and patient advancing diet today.     Agree with plan to advance diet as tolerated and discharge with close follow up with [facility] for ongoing oncology management. We will sign off at this time, please page if questions.     Savanah Gray PA-C ([place])    Hematology/Oncology   Pager: [phone]

## 2021-12-09 VITALS
HEART RATE: 74 BPM | DIASTOLIC BLOOD PRESSURE: 84 MMHG | TEMPERATURE: 99.1 F | BODY MASS INDEX: 16.48 KG/M2 | SYSTOLIC BLOOD PRESSURE: 123 MMHG | HEIGHT: 63 IN | WEIGHT: 93 LBS | RESPIRATION RATE: 18 BRPM | OXYGEN SATURATION: 99 %

## 2021-12-09 PROBLEM — E08.9 DIABETES MELLITUS DUE TO UNDERLYING CONDITION (H): Status: ACTIVE | Noted: 2021-12-09

## 2021-12-09 LAB
GLUCOSE BLDC GLUCOMTR-MCNC: 275 MG/DL (ref 70–99)
HGB BLD-MCNC: 9.3 G/DL (ref 11.7–15.7)
PANC ISLET CELL AB TITR SER: NORMAL {TITER}

## 2021-12-09 PROCEDURE — 99239 HOSP IP/OBS DSCHRG MGMT >30: CPT | Performed by: STUDENT IN AN ORGANIZED HEALTH CARE EDUCATION/TRAINING PROGRAM

## 2021-12-09 PROCEDURE — 85018 HEMOGLOBIN: CPT | Performed by: STUDENT IN AN ORGANIZED HEALTH CARE EDUCATION/TRAINING PROGRAM

## 2021-12-09 PROCEDURE — 250N000013 HC RX MED GY IP 250 OP 250 PS 637: Performed by: HOSPITALIST

## 2021-12-09 PROCEDURE — 250N000011 HC RX IP 250 OP 636: Performed by: STUDENT IN AN ORGANIZED HEALTH CARE EDUCATION/TRAINING PROGRAM

## 2021-12-09 PROCEDURE — 36591 DRAW BLOOD OFF VENOUS DEVICE: CPT | Performed by: STUDENT IN AN ORGANIZED HEALTH CARE EDUCATION/TRAINING PROGRAM

## 2021-12-09 PROCEDURE — 258N000003 HC RX IP 258 OP 636: Performed by: STUDENT IN AN ORGANIZED HEALTH CARE EDUCATION/TRAINING PROGRAM

## 2021-12-09 PROCEDURE — 99233 SBSQ HOSP IP/OBS HIGH 50: CPT | Performed by: SURGERY

## 2021-12-09 RX ORDER — HEPARIN SODIUM,PORCINE 10 UNIT/ML
5-10 VIAL (ML) INTRAVENOUS
Status: DISCONTINUED | OUTPATIENT
Start: 2021-12-09 | End: 2021-12-09 | Stop reason: HOSPADM

## 2021-12-09 RX ORDER — HEPARIN SODIUM (PORCINE) LOCK FLUSH IV SOLN 100 UNIT/ML 100 UNIT/ML
5-10 SOLUTION INTRAVENOUS
Status: DISCONTINUED | OUTPATIENT
Start: 2021-12-09 | End: 2021-12-09 | Stop reason: HOSPADM

## 2021-12-09 RX ORDER — INSULIN LISPRO 100 [IU]/ML
INJECTION, SOLUTION INTRAVENOUS; SUBCUTANEOUS
Qty: 15 ML | Refills: 0 | Status: SHIPPED | OUTPATIENT
Start: 2021-12-09 | End: 2021-12-09

## 2021-12-09 RX ORDER — HEPARIN SODIUM,PORCINE 10 UNIT/ML
5-10 VIAL (ML) INTRAVENOUS EVERY 24 HOURS
Status: DISCONTINUED | OUTPATIENT
Start: 2021-12-09 | End: 2021-12-09 | Stop reason: HOSPADM

## 2021-12-09 RX ORDER — INSULIN LISPRO 100 [IU]/ML
INJECTION, SOLUTION INTRAVENOUS; SUBCUTANEOUS
Qty: 15 ML | Refills: 0 | Status: ON HOLD | OUTPATIENT
Start: 2021-12-09 | End: 2021-12-18

## 2021-12-09 RX ADMIN — ACETAMINOPHEN 650 MG: 325 TABLET, FILM COATED ORAL at 04:59

## 2021-12-09 RX ADMIN — HEPARIN 5 ML: 100 SYRINGE at 10:27

## 2021-12-09 RX ADMIN — SODIUM CHLORIDE, POTASSIUM CHLORIDE, SODIUM LACTATE AND CALCIUM CHLORIDE: 600; 310; 30; 20 INJECTION, SOLUTION INTRAVENOUS at 04:57

## 2021-12-09 ASSESSMENT — ACTIVITIES OF DAILY LIVING (ADL)
ADLS_ACUITY_SCORE: 5

## 2021-12-09 NOTE — DISCHARGE SUMMARY
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  The following   labs/tests are recommended: BMP and CBC.    Please follow-up on BRYANT, insulin, islet cell antibodies.  Please follow-up   on abdominal pain, malnutrition (further nutrition support), and insulin   regimen.  Please follow-up on further oncology recs.  Patient would like   to talk about possible future G-tube placement with her primary   oncologist.             Unresulted Labs Ordered in the Past 30 Days of this Admission     Date and Time Order Name Status Description    12/8/2021  1:00 AM Insulin antibody In process     12/8/2021  1:00 AM Glutamic acid decarboxylase antibody In process     12/8/2021  1:00 AM Islet cell antibody IgG In process     12/7/2021 12:40 AM Blood Culture Peripheral Blood Preliminary     12/7/2021 12:40 AM Blood Culture Arm, Right Preliminary       These results will be followed up by PCP    Discharge Disposition   Discharged to home  Condition at discharge: Stable    Hospital Course             Shira Joy is a 41 year old female admitted on 12/6/2021. She has PMHx of metastatic gastric adenocarcinoma with liver and ovarian metastases and peritoneal carcinomatosis (diagnosed 7/2021, on treatment with FOLFOX plus trastuzumab and keydtruda since 8/25/21). She is admitted with abdominal pain.       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 Cholelithiasis and dilated common bile duct without evidence of acute cholecystitis. Lactic acid normal. Hemodynamically stable. Surgery recommendation for conservative management and patient was able to tolerate a regular diet with resolution of her SBO.  Oncology has seen and no further changes at this time with recommendation to follow-up with [facility] oncology for ongoing management.  Patient was discharged in stable condition to home.  She is planning on talking to her outpatient oncologist about future feeding/G-tube options.  Patient instructed to follow up at PCP clinic, regarding upcoming lab draws, and medication changes. Patient understood instructions and all questions answered.     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 A/W autoimmune related hypothalamic, adrenal, thyroid, diabetes.  Normal adrenal, thyroid labs on last admission at [facility]. Discharged on lantus 15 once daily at bedtime, humalog ICR 10, ISF 50, NPH 5 units PRN with steroids chemotherapy. Patient has CGM and gets low alarms.    Consulted endocrinology with recommendation to discharge patient on 12 units of Lantus daily, 1 unit of short acting insulin for every 15 g of carbohydrates with meals and snacks, and continuation of her home sliding scale.  Patient directed to change her blood glucose sensor at home.  Recommendation to follow-up with endocrine in 2 weeks discussed with patient.  Insulin, BRYANT, and islet cell antibodies will need to be followed as outpatient.    Underweight  Severe malnutrition  BMI of 16.47 likely in setting of her cancer,  outpatient nutrition referral placed.    Consultations This Hospital Stay   ONCOLOGY ADULT IP CONSULT  SURGICAL ONCOLOGY ADULT IP CONSULT  ENDOCRINE DIABETES ADULT IP CONSULT  NUTRITION SERVICES ADULT IP CONSULT    Code Status   Prior    Time Spent on this Encounter   IMoe MD, personally saw the patient today and spent greater than 30 minutes discharging this patient.       Moe Ridley MD  [facility] [address] [address]  Phone: [phone]  ______________________________________________________________________    Physical Exam   Vital Signs: Temp: 99.1  F (37.3  C) Temp src: Temporal BP: 123/84 Pulse: 74   Resp: 18 SpO2: 99 % O2 Device: None (Room air)    Weight: 93 lbs 0 oz  General Appearance: Pleasant, NAD, cachectic   Respiratory: CTA B/L, no crackles or wheezing  Cardiovascular: S1, S2, no gallop  GI: Soft, mild generalized tenderness on palpation (improved), no rebound or guarding, bowel sounds present  Skin: No rashes  Musculoskeletal: Normal  Neurologic: Gross motor normal  Psychiatric: AAA X 3, normal mood and affect       Primary Care Physician   Kae Parmar    Discharge Orders      Nutrition Referral      Activity    Your activity upon discharge: activity as tolerated     Reason for your hospital stay    You were hospitalized for small bowel obstruction and abdominal pain.    Please follow up with endocrine in 2 weeks and follow up with you oncologist as needed. Please follow up with your PCP within 7 days.     Follow Up and recommended labs and tests    Follow up with primary care provider, Kae Parmar, within 7 days for hospital follow- up and to follow up on results.  The following labs/tests are recommended: BMP and CBC.    Please follow-up on BRYANT, insulin, islet cell antibodies.  Please follow-up on abdominal pain, malnutrition (further nutrition support), and insulin regimen.  Please follow-up on further oncology recs.   Patient would like to talk about possible future G-tube placement with her primary oncologist.     Diet    Follow this diet upon discharge: Orders Placed This Encounter      Consistent Carbohydrate Diet Moderate Consistent Carb (60 g CHO per Meal) Diet       Significant Results and Procedures   Most Recent 3 CBC's:Recent Labs   Lab Test 12/09/21  0723 12/08/21  0743 12/07/21  0752 12/06/21  1538   WBC  --  4.9 5.4 8.7   HGB 9.3* 9.2* 9.7* 10.7*   MCV  --  92 91 89   PLT  --  239 283 304     Most Recent 3 BMP's:Recent Labs   Lab Test 12/09/21  0807 12/08/21  2244 12/08/21  1918 12/08/21  0805 12/08/21  0743 12/07/21  0852 12/07/21  0752 12/07/21  0251 12/06/21  1538   NA  --   --   --   --  138  --  135  --  136   POTASSIUM  --   --   --   --  3.7  --  4.0  --  3.6   CHLORIDE  --   --   --   --  104  --  100  --  102   CO2  --   --   --   --  27  --  26  --  26   BUN  --   --   --   --  6*  --  13  --  11   CR  --   --   --   --  0.56  --  0.60  --  0.47*   ANIONGAP  --   --   --   --  7  --  9  --  8   JORGE L  --   --   --   --  8.6  --  8.9  --  8.9   * 155* 77   < > 132*   < > 256*   < > 167*    < > = values in this interval not displayed.   ,   Results for orders placed or performed during the hospital encounter of 12/06/21   CT Abdomen Pelvis w Contrast     Value    Radiologist flags (Urgent)     Small bowel obstruction transition point located in    Narrative    EXAMINATION: CT ABDOMEN PELVIS W CONTRAST, 12/6/2021 7:41 PM    INDICATION: Abdominal distension; Abdominal pain, acute, nonlocalized.  Patient has a HISTORY of metastatic gastric adenocarcinoma to the  liver, ovaries, and peritoneal carcinomatosis.    COMPARISON STUDY: CT 7/24/2021. PET CT 8/5/2021.    TECHNIQUE: CT scan of the abdomen and pelvis was performed on  multidetector CT scanner using volumetric acquisition technique and  images were reconstructed in multiple planes with variable thickness  and reviewed on dedicated workstations.      CONTRAST: iopamidol (ISOVUE-370) solution 57 mL injected IV     CT scan radiation dose is optimized to minimum requisite dose using  automated dose modulation techniques.    FINDINGS:    Lower thorax: Heart size is normal. No pericardial effusion.  The lung  bases are clear.    Liver: 1.5 x 1.5 cm hypoattenuating mass in hepatic segment 7/8 not  substantially changed from prior. Unchanged hypoattenuating  subcentimeter lesion of the dome of the right hepatic lobe. No  intrahepatic biliary ductal dilation.    Biliary System: Cholelithiasis without secondary evidence of acute  cholecystitis.. The common bile duct measures up to 10 mm, previously  measuring up to 8 mm on 7/24/2021.    Pancreas: No mass or pancreatic ductal dilation.    Adrenal glands: No mass or nodules    Spleen: Normal.    Kidneys: No suspicious mass, obstructing calculus or hydronephrosis.    Gastrointestinal tract: Normal appendix. There are multiple stacked  distended loops of distal small bowel measuring up to 3.7 cm in the  right lower quadrant and central abdomen with focal moderate grade  transition point in the right lower quadrant (series 5 image 319). The  immediate upstream dilated small bowel contains some fecalized  material.  The large bowel is nondilated. There is diffuse  hypoattenuation and thickening of the stomach wall with continued  perigastric soft tissue abnormality along the greater curvature of the  stomach extending inferomedially towards the umbilicus encasing the  transverse colon, increased since 7/24/2021.      Mesentery/peritoneum/retroperitoneum: Moderate volume ascites. No  evidence of free air.    Lymph nodes: No significant lymphadenopathy.    Vasculature: Patent major abdominal vasculature.    Pelvis: The urinary bladder is displaced to the right pelvis.  Increased multilobulated, heterogeneously enhancing pelvic masses  without definable ovaries, though a new cystic structure in the right  pelvis may represent  an ovarian cyst. Increased heterogeneous  enhancement of the myometrium, likely related to tumor involvement.  Mild narrowing of the rectosigmoid junction related to adjacent soft  tissue abnormality.    Osseous structures: No aggressive or acute osseous lesion.      Soft tissues: Within normal limits.  The bilateral breast implants.        Impression    IMPRESSION: This patient with a history of metastatic gastric  adenocarcinoma:  1. Distal small bowel obstruction with transition point in the right  lower quadrant, potentially due to extrinsic narrowing of the small  bowel related to peritoneal disease.  2. Increased perigastric soft tissue abnormality and metastatic  ovarian/pelvic lesions and no significant change in at least one liver  metastasis.   3. Moderate volume ascites.  4. Cholelithiasis and dilated common bile duct without evidence of  acute cholecystitis.      [Urgent Result: Small bowel obstruction transition point located in  the left upper quadrant.]    Finding was identified on 12/6/2021 7:44 PM.     Dr. Ma was contacted by Dr. Berman at 12/6/2021 8:02 PM and  verbalized understanding of the urgent finding.     I have personally reviewed the examination and initial interpretation  and I agree with the findings.    KATELYN SILVA DO         SYSTEM ID:  [id number]   XR Abdomen Port 1 View    Narrative    XR ABDOMEN PORT 1 VIEWS on 12/6/2021 9:09 PM.    INDICATION: NG tube placement verification.    COMPARISON: CT dated 12/6/2021.    FINDINGS:   Portable AP upright radiograph of the abdomen. Gastric tube sidehole  projects immediately inferior to the gastroesophageal junction. There  are dilated loops of small bowel in the low central abdomen. No  definite pneumatosis or portal venous gas. No intra-abdominal free  air. Excreted contrast within the renal collecting systems  bilaterally. The lung bases are clear.      Impression    IMPRESSION:   Gastric tube sidehole projects immediately inferior  to the  gastroesophageal junction. Consider advancing.    I have personally reviewed the examination and initial interpretation  and I agree with the findings.    KATELYN SILAV DO         SYSTEM ID:  [id number]   XR Abdomen Port 1 View    Narrative    EXAM: XR ABDOMEN PORT 1 VIEWS, 12/7/2021 2:39 AM    INDICATION: NG advancement    COMPARISON: Abdominal x-ray 12/6/2021, abdomen/pelvis CT 12/6/2021    FINDINGS  Technique: Upright AP view of the abdomen.      Devices: Gastric tube passes below the left hemidiaphragm with tip and  side port projected over the left upper quadrant. Partially visualized  catheter projected over the high right atrium.    No dilated bowel or definite pneumatosis visualized.      Impression    IMPRESSION:   Gastric tube tip and sidehole project over the stomach.    I have personally reviewed the examination and initial interpretation  and I agree with the findings.    OZ LANDIS MD         SYSTEM ID:  [id number]       Discharge Medications   Discharge Medication List as of 12/9/2021 10:34 AM      CONTINUE these medications which have CHANGED    Details   insulin glargine (LANTUS PEN) 100 UNIT/ML pen Inject 12 Units Subcutaneous every morning, Disp-15 mL, R-0, HistoricalIf Lantus is not covered by insurance, may substitute Basaglar at same dose and frequency.        !! insulin lispro (HUMALOG KWIKPEN) 100 UNIT/ML (1 unit dial) KWIKPEN Inject 1 Unit of Humalog for every 15 grams of carbohydrates with meals and snacks as needed., Disp-15 mL, R-0, E-Prescribe       !! - Potential duplicate medications found. Please discuss with provider.      CONTINUE these medications which have NOT CHANGED    Details   !! insulin lispro (HUMALOG KWIKPEN) 100 UNIT/ML (1 unit dial) KWIKPEN Inject 0-30 Units Subcutaneous 3 times daily (before meals) Per sliding scale, Max TDD = 45 units, Historical      insulin  UNIT/ML injection Inject 5 Units Subcutaneous daily as needed for other (Take with steroid  dose prior to chemotherapy), Historical      LORazepam (ATIVAN) 0.5 MG tablet Take 0.5 mg by mouth every 4 hours as needed for anxiety, Historical      acetaminophen 500 MG CAPS Take 1,000 mg by mouth, Historical      pantoprazole (PROTONIX) 40 MG EC tablet Historical       !! - Potential duplicate medications found. Please discuss with provider.        Allergies   No Known Allergies

## 2021-12-09 NOTE — PLAN OF CARE
Pt had improved significantly. Tolerated diet well, denied any major abd discomfort with food. Medicine team put in order to discharge pt home and follow up with PCP and Endocrinology. R Port-a-cath Hep lock and de-accessed. Discharge instruction given. Pt was accompanied home by .

## 2021-12-09 NOTE — PLAN OF CARE
Time:      Reason for Admission: SBO    Activity: Independent    Neuro: A&O x4. Calm and cooperative.     GI/: Voiding spontaneously.     Diet: Moderate consistent carb, tolerating.     Incisions/Drains: None    IV Access: R Port infusing LR at 75mL/hr.     Labs: B and 155 .    Vitals: VSS on RA    Pain: Pt reported some back pain. PRN tylenol given x1.      New changes this shift: Pt stated discomfort in stomach after eating was starting to feel like what it did before coming to the hospital.     Plan: Continue with plan of care.

## 2021-12-09 NOTE — PROGRESS NOTES
General Surgery Progress Note  12/09/2021   ------------------------------------------------------------------------------------------------  Subjective:  This AM, patient states she has been passing gas, had a BM yesterday after dinner. No pain with full liquid diet; did notice discomfort when she started solids, which eventually subsided. Denies nausea, vomiting.  ------------------------------------------------------------------------------------------------  Objective:  Temp:  [98.2  F (36.8  C)-99.1  F (37.3  C)] 99.1  F (37.3  C)  Pulse:  [72-83] 74  Resp:  [16-18] 18  BP: (123-134)/(73-95) 123/84  SpO2:  [98 %-99 %] 99 %    I/O last 3 completed shifts:  In: 10 [I.V.:10]  Out: -       Gen: Awake, interactive, NAD  Resp: breathing comfortably on room air  CV: regular rate, appears well perfused  Abd: soft, non distended, non tender to palpation  Ext: moves all extremities, no edema     Labs:  Recent Labs   Lab 12/09/21  0723 12/08/21  0743 12/07/21  0752 12/06/21  1538   WBC  --  4.9 5.4 8.7   HGB 9.3* 9.2* 9.7* 10.7*   PLT  --  239 283 304       Recent Labs   Lab 12/09/21  0807 12/08/21  2244 12/08/21  1918 12/08/21  0805 12/08/21  0743 12/07/21  0852 12/07/21  0752 12/07/21  0251 12/06/21  1538   NA  --   --   --   --  138  --  135  --  136   POTASSIUM  --   --   --   --  3.7  --  4.0  --  3.6   CHLORIDE  --   --   --   --  104  --  100  --  102   CO2  --   --   --   --  27  --  26  --  26   BUN  --   --   --   --  6*  --  13  --  11   CR  --   --   --   --  0.56  --  0.60  --  0.47*   * 155* 77   < > 132*   < > 256*   < > 167*   JORGE L  --   --   --   --  8.6  --  8.9  --  8.9    < > = values in this interval not displayed.       Imaging:  No new imaging    =======================================================  Assessment/Plan:   41 year old female with metastatic gastric adenocarcinoma presenting with increasing abdominal pain and hard stools with CT of the abdomen demonstrates a bowel obstruction  with transition point in the left upper quadrant, with moderate volume ascites.  Patient has been passing gas, had a BM after dinner, tolerating FLD.     - Discussed with malignant bowel obstruction, she may not return to her baseline 3 meals a day and the problem may recur due to its etiology. Recommending she take smaller more consistent meals and consider staying on a softer/liquid diet for ease of passage.  - May discontinue IVF once adequate PO  - Encourage ambulation  - Okay to discharge once tolerating diet  - General surgery to sign off     Seen, examined, and discussed with chief resident Dr. Georges, who will discuss with staff.    Julia José MD PGY-1  she/her

## 2021-12-10 ENCOUNTER — PATIENT OUTREACH (OUTPATIENT)
Dept: ONCOLOGY | Facility: CLINIC | Age: 41
End: 2021-12-10
Payer: COMMERCIAL

## 2021-12-10 DIAGNOSIS — Z71.89 OTHER SPECIFIED COUNSELING: ICD-10-CM

## 2021-12-10 LAB — INSULIN AB SER IA-ACNC: <0.4 U/ML

## 2021-12-11 LAB — GAD65 AB SER IA-ACNC: <5 IU/ML

## 2021-12-12 ENCOUNTER — APPOINTMENT (OUTPATIENT)
Dept: CT IMAGING | Facility: CLINIC | Age: 41
End: 2021-12-12
Attending: EMERGENCY MEDICINE
Payer: COMMERCIAL

## 2021-12-12 ENCOUNTER — HOSPITAL ENCOUNTER (INPATIENT)
Facility: CLINIC | Age: 41
LOS: 5 days | Discharge: HOME HEALTH | End: 2021-12-18
Attending: EMERGENCY MEDICINE | Admitting: INTERNAL MEDICINE
Payer: COMMERCIAL

## 2021-12-12 DIAGNOSIS — K56.609 INTERMITTENT SMALL BOWEL OBSTRUCTION (H): ICD-10-CM

## 2021-12-12 DIAGNOSIS — C16.9 MALIGNANT NEOPLASM OF STOMACH, UNSPECIFIED LOCATION (H): ICD-10-CM

## 2021-12-12 DIAGNOSIS — C16.2 MALIGNANT NEOPLASM OF BODY OF STOMACH (H): ICD-10-CM

## 2021-12-12 DIAGNOSIS — C79.63: ICD-10-CM

## 2021-12-12 DIAGNOSIS — Z79.4 DIABETES MELLITUS DUE TO UNDERLYING CONDITION WITHOUT COMPLICATION, WITH LONG-TERM CURRENT USE OF INSULIN (H): ICD-10-CM

## 2021-12-12 DIAGNOSIS — Z20.822 CONTACT WITH AND (SUSPECTED) EXPOSURE TO COVID-19: ICD-10-CM

## 2021-12-12 DIAGNOSIS — K56.609 BOWEL OBSTRUCTION (H): Primary | ICD-10-CM

## 2021-12-12 DIAGNOSIS — K56.609 SMALL BOWEL OBSTRUCTION (H): ICD-10-CM

## 2021-12-12 DIAGNOSIS — E08.9 DIABETES MELLITUS DUE TO UNDERLYING CONDITION WITHOUT COMPLICATION, UNSPECIFIED WHETHER LONG TERM INSULIN USE (H): ICD-10-CM

## 2021-12-12 DIAGNOSIS — C78.7 SECONDARY MALIGNANT NEOPLASM OF LIVER (H): ICD-10-CM

## 2021-12-12 DIAGNOSIS — Z79.4 TYPE 2 DIABETES MELLITUS WITHOUT COMPLICATION, WITH LONG-TERM CURRENT USE OF INSULIN (H): ICD-10-CM

## 2021-12-12 DIAGNOSIS — E11.9 TYPE 2 DIABETES MELLITUS WITHOUT COMPLICATION, WITH LONG-TERM CURRENT USE OF INSULIN (H): ICD-10-CM

## 2021-12-12 DIAGNOSIS — C78.6 PERITONEAL CARCINOMATOSIS (H): ICD-10-CM

## 2021-12-12 DIAGNOSIS — E08.9 DIABETES MELLITUS DUE TO UNDERLYING CONDITION WITHOUT COMPLICATION, WITH LONG-TERM CURRENT USE OF INSULIN (H): ICD-10-CM

## 2021-12-12 LAB
ALBUMIN SERPL-MCNC: 3.3 G/DL (ref 3.4–5)
ALP SERPL-CCNC: 76 U/L (ref 40–150)
ALT SERPL W P-5'-P-CCNC: 32 U/L (ref 0–50)
ANION GAP SERPL CALCULATED.3IONS-SCNC: 10 MMOL/L (ref 3–14)
AST SERPL W P-5'-P-CCNC: 23 U/L (ref 0–45)
BACTERIA BLD CULT: NO GROWTH
BACTERIA BLD CULT: NO GROWTH
BASOPHILS # BLD AUTO: 0 10E3/UL (ref 0–0.2)
BASOPHILS NFR BLD AUTO: 0 %
BILIRUB SERPL-MCNC: 0.6 MG/DL (ref 0.2–1.3)
BUN SERPL-MCNC: 11 MG/DL (ref 7–30)
CALCIUM SERPL-MCNC: 9.2 MG/DL (ref 8.5–10.1)
CHLORIDE BLD-SCNC: 102 MMOL/L (ref 94–109)
CO2 SERPL-SCNC: 25 MMOL/L (ref 20–32)
CREAT SERPL-MCNC: 0.52 MG/DL (ref 0.52–1.04)
EOSINOPHIL # BLD AUTO: 0 10E3/UL (ref 0–0.7)
EOSINOPHIL NFR BLD AUTO: 0 %
ERYTHROCYTE [DISTWIDTH] IN BLOOD BY AUTOMATED COUNT: 15.1 % (ref 10–15)
GFR SERPL CREATININE-BSD FRML MDRD: >90 ML/MIN/1.73M2
GLUCOSE BLD-MCNC: 146 MG/DL (ref 70–99)
HCG SERPL QL: NEGATIVE
HCT VFR BLD AUTO: 30.9 % (ref 35–47)
HGB BLD-MCNC: 10.2 G/DL (ref 11.7–15.7)
IMM GRANULOCYTES # BLD: 0 10E3/UL
IMM GRANULOCYTES NFR BLD: 0 %
LYMPHOCYTES # BLD AUTO: 0.8 10E3/UL (ref 0.8–5.3)
LYMPHOCYTES NFR BLD AUTO: 11 %
MCH RBC QN AUTO: 29.1 PG (ref 26.5–33)
MCHC RBC AUTO-ENTMCNC: 33 G/DL (ref 31.5–36.5)
MCV RBC AUTO: 88 FL (ref 78–100)
MONOCYTES # BLD AUTO: 0.5 10E3/UL (ref 0–1.3)
MONOCYTES NFR BLD AUTO: 7 %
NEUTROPHILS # BLD AUTO: 5.9 10E3/UL (ref 1.6–8.3)
NEUTROPHILS NFR BLD AUTO: 82 %
NRBC # BLD AUTO: 0 10E3/UL
NRBC BLD AUTO-RTO: 0 /100
PLATELET # BLD AUTO: 250 10E3/UL (ref 150–450)
POTASSIUM BLD-SCNC: 3.8 MMOL/L (ref 3.4–5.3)
PROT SERPL-MCNC: 7.5 G/DL (ref 6.8–8.8)
RBC # BLD AUTO: 3.5 10E6/UL (ref 3.8–5.2)
SODIUM SERPL-SCNC: 137 MMOL/L (ref 133–144)
WBC # BLD AUTO: 7.3 10E3/UL (ref 4–11)

## 2021-12-12 PROCEDURE — 96374 THER/PROPH/DIAG INJ IV PUSH: CPT | Mod: 59 | Performed by: EMERGENCY MEDICINE

## 2021-12-12 PROCEDURE — 99285 EMERGENCY DEPT VISIT HI MDM: CPT | Performed by: EMERGENCY MEDICINE

## 2021-12-12 PROCEDURE — 85025 COMPLETE CBC W/AUTO DIFF WBC: CPT | Performed by: EMERGENCY MEDICINE

## 2021-12-12 PROCEDURE — 74177 CT ABD & PELVIS W/CONTRAST: CPT | Mod: 26 | Performed by: RADIOLOGY

## 2021-12-12 PROCEDURE — 84703 CHORIONIC GONADOTROPIN ASSAY: CPT | Performed by: EMERGENCY MEDICINE

## 2021-12-12 PROCEDURE — 36415 COLL VENOUS BLD VENIPUNCTURE: CPT | Performed by: EMERGENCY MEDICINE

## 2021-12-12 PROCEDURE — C9803 HOPD COVID-19 SPEC COLLECT: HCPCS | Performed by: EMERGENCY MEDICINE

## 2021-12-12 PROCEDURE — 74177 CT ABD & PELVIS W/CONTRAST: CPT

## 2021-12-12 PROCEDURE — 250N000011 HC RX IP 250 OP 636: Performed by: EMERGENCY MEDICINE

## 2021-12-12 PROCEDURE — 80053 COMPREHEN METABOLIC PANEL: CPT | Performed by: EMERGENCY MEDICINE

## 2021-12-12 PROCEDURE — 96375 TX/PRO/DX INJ NEW DRUG ADDON: CPT | Performed by: EMERGENCY MEDICINE

## 2021-12-12 PROCEDURE — 99285 EMERGENCY DEPT VISIT HI MDM: CPT | Mod: 25 | Performed by: EMERGENCY MEDICINE

## 2021-12-12 PROCEDURE — 96376 TX/PRO/DX INJ SAME DRUG ADON: CPT | Performed by: EMERGENCY MEDICINE

## 2021-12-12 RX ORDER — IOPAMIDOL 755 MG/ML
57 INJECTION, SOLUTION INTRAVASCULAR ONCE
Status: COMPLETED | OUTPATIENT
Start: 2021-12-12 | End: 2021-12-12

## 2021-12-12 RX ORDER — ONDANSETRON 2 MG/ML
4 INJECTION INTRAMUSCULAR; INTRAVENOUS ONCE
Status: COMPLETED | OUTPATIENT
Start: 2021-12-12 | End: 2021-12-12

## 2021-12-12 RX ORDER — HYDROMORPHONE HYDROCHLORIDE 1 MG/ML
0.5 INJECTION, SOLUTION INTRAMUSCULAR; INTRAVENOUS; SUBCUTANEOUS EVERY 30 MIN PRN
Status: DISCONTINUED | OUTPATIENT
Start: 2021-12-12 | End: 2021-12-13

## 2021-12-12 RX ADMIN — IOPAMIDOL 57 ML: 755 INJECTION, SOLUTION INTRAVENOUS at 22:55

## 2021-12-12 RX ADMIN — HYDROMORPHONE HYDROCHLORIDE 0.5 MG: 1 INJECTION, SOLUTION INTRAMUSCULAR; INTRAVENOUS; SUBCUTANEOUS at 21:23

## 2021-12-12 RX ADMIN — ONDANSETRON 4 MG: 2 INJECTION INTRAMUSCULAR; INTRAVENOUS at 20:28

## 2021-12-12 RX ADMIN — HYDROMORPHONE HYDROCHLORIDE 0.5 MG: 1 INJECTION, SOLUTION INTRAMUSCULAR; INTRAVENOUS; SUBCUTANEOUS at 20:29

## 2021-12-12 NOTE — LETTER
Transition Communication Hand-off for Care Transitions to Next Level of Care Provider    Name: Shira Joy  : 1980  MRN #: [id number]  Primary Care Provider: Kae Parmar     Primary Clinic: [facility] [address]  [place] [address]     Reason for Hospitalization:  Bowel obstruction (H) [K56.609]  Small bowel obstruction (H) [K56.609]  Admit Date/Time: 2021  7:43 PM  Discharge Date:   Discharge to home today 2021  Payor Source: Payor: BCBS / Plan: [employer] [place] / Product Type: Indemnity /   Discharge Plan:  Home with home TPN and new G-tube for decompression and home suction machine Discharge Needs Assessment:  Needs      Most Recent Value   Equipment Currently Used at Home none        Future Appointments   Date Time Provider Department Center   2022  9:30 AM Santa Mohan [facility]C [facility]       Any outstanding tests or procedures:        Referrals     Future Labs/Procedures    Home infusion referral     Comments:    Please fax discharge orders to [facility]    Ph:     Fax: [phone]    Skilled home care RN for initial home safety evaluation and 1-3 times a week to evaluate medication management, nutrition and hydration evaluation, endurance evaluation, and general status evaluation after discharge from the acute care hospital setting.    Skilled home care RN to assist with education reinforcement with home TPN via central line.  TPN labs and central line cares per home care agency routine.    Skilled home care RN to assist with education reinforcement with home NG Tube for decompression.          Supplies     Future Labs/Procedures    MISCELLANEOUS DME SUPPLY OR ACCESSORY, NOT OTHERWISE SPECIFIED     Comments:    Home DME Equipment for Goo Home Suction Pump:  [facility]     Ph:  [phone]     Fax:  [phone]     Goo Suction Machine     Suction Canisters (2)  Suction Connector tube (2)  5 in 1 Connector (2)         Katarzyna Juarez RN

## 2021-12-13 ENCOUNTER — APPOINTMENT (OUTPATIENT)
Dept: GENERAL RADIOLOGY | Facility: CLINIC | Age: 41
End: 2021-12-13
Attending: EMERGENCY MEDICINE
Payer: COMMERCIAL

## 2021-12-13 PROBLEM — K56.609 BOWEL OBSTRUCTION (H): Status: ACTIVE | Noted: 2021-12-13

## 2021-12-13 LAB
GLUCOSE BLDC GLUCOMTR-MCNC: 124 MG/DL (ref 70–99)
GLUCOSE BLDC GLUCOMTR-MCNC: 168 MG/DL (ref 70–99)
GLUCOSE BLDC GLUCOMTR-MCNC: 81 MG/DL (ref 70–99)
GLUCOSE BLDC GLUCOMTR-MCNC: 82 MG/DL (ref 70–99)
POTASSIUM BLD-SCNC: 3.8 MMOL/L (ref 3.4–5.3)
SARS-COV-2 RNA RESP QL NAA+PROBE: NEGATIVE

## 2021-12-13 PROCEDURE — 84132 ASSAY OF SERUM POTASSIUM: CPT | Performed by: INTERNAL MEDICINE

## 2021-12-13 PROCEDURE — 250N000011 HC RX IP 250 OP 636: Performed by: INTERNAL MEDICINE

## 2021-12-13 PROCEDURE — 99255 IP/OBS CONSLTJ NEW/EST HI 80: CPT | Mod: GC | Performed by: INTERNAL MEDICINE

## 2021-12-13 PROCEDURE — 99223 1ST HOSP IP/OBS HIGH 75: CPT | Mod: AI | Performed by: INTERNAL MEDICINE

## 2021-12-13 PROCEDURE — 99223 1ST HOSP IP/OBS HIGH 75: CPT | Performed by: NURSE PRACTITIONER

## 2021-12-13 PROCEDURE — 258N000003 HC RX IP 258 OP 636: Performed by: EMERGENCY MEDICINE

## 2021-12-13 PROCEDURE — 250N000012 HC RX MED GY IP 250 OP 636 PS 637: Performed by: INTERNAL MEDICINE

## 2021-12-13 PROCEDURE — 258N000001 HC RX 258: Performed by: NURSE PRACTITIONER

## 2021-12-13 PROCEDURE — 99221 1ST HOSP IP/OBS SF/LOW 40: CPT | Performed by: SURGERY

## 2021-12-13 PROCEDURE — 120N000002 HC R&B MED SURG/OB UMMC

## 2021-12-13 PROCEDURE — 999N000065 XR ABDOMEN PORT 1 VIEWS

## 2021-12-13 PROCEDURE — 36415 COLL VENOUS BLD VENIPUNCTURE: CPT | Performed by: INTERNAL MEDICINE

## 2021-12-13 PROCEDURE — 250N000011 HC RX IP 250 OP 636: Performed by: EMERGENCY MEDICINE

## 2021-12-13 PROCEDURE — U0003 INFECTIOUS AGENT DETECTION BY NUCLEIC ACID (DNA OR RNA); SEVERE ACUTE RESPIRATORY SYNDROME CORONAVIRUS 2 (SARS-COV-2) (CORONAVIRUS DISEASE [COVID-19]), AMPLIFIED PROBE TECHNIQUE, MAKING USE OF HIGH THROUGHPUT TECHNOLOGIES AS DESCRIBED BY CMS-2020-01-R: HCPCS | Performed by: EMERGENCY MEDICINE

## 2021-12-13 PROCEDURE — 96376 TX/PRO/DX INJ SAME DRUG ADON: CPT | Performed by: EMERGENCY MEDICINE

## 2021-12-13 PROCEDURE — 250N000011 HC RX IP 250 OP 636: Performed by: STUDENT IN AN ORGANIZED HEALTH CARE EDUCATION/TRAINING PROGRAM

## 2021-12-13 PROCEDURE — 74018 RADEX ABDOMEN 1 VIEW: CPT | Mod: 26 | Performed by: RADIOLOGY

## 2021-12-13 RX ORDER — PROCHLORPERAZINE MALEATE 5 MG
10 TABLET ORAL EVERY 6 HOURS PRN
Status: DISCONTINUED | OUTPATIENT
Start: 2021-12-13 | End: 2021-12-18 | Stop reason: HOSPADM

## 2021-12-13 RX ORDER — ONDANSETRON 2 MG/ML
4 INJECTION INTRAMUSCULAR; INTRAVENOUS EVERY 6 HOURS PRN
Status: DISCONTINUED | OUTPATIENT
Start: 2021-12-13 | End: 2021-12-18 | Stop reason: HOSPADM

## 2021-12-13 RX ORDER — DEXTROSE MONOHYDRATE 25 G/50ML
25-50 INJECTION, SOLUTION INTRAVENOUS
Status: DISCONTINUED | OUTPATIENT
Start: 2021-12-13 | End: 2021-12-18 | Stop reason: HOSPADM

## 2021-12-13 RX ORDER — LIDOCAINE HYDROCHLORIDE 40 MG/ML
5 SOLUTION TOPICAL
Status: DISCONTINUED | OUTPATIENT
Start: 2021-12-13 | End: 2021-12-18 | Stop reason: HOSPADM

## 2021-12-13 RX ORDER — HYDROMORPHONE HYDROCHLORIDE 1 MG/ML
0.5 INJECTION, SOLUTION INTRAMUSCULAR; INTRAVENOUS; SUBCUTANEOUS
Status: DISCONTINUED | OUTPATIENT
Start: 2021-12-13 | End: 2021-12-13

## 2021-12-13 RX ORDER — LORAZEPAM 2 MG/ML
0.5 INJECTION INTRAMUSCULAR EVERY 6 HOURS PRN
Status: DISCONTINUED | OUTPATIENT
Start: 2021-12-13 | End: 2021-12-18 | Stop reason: HOSPADM

## 2021-12-13 RX ORDER — SODIUM CHLORIDE 9 MG/ML
INJECTION, SOLUTION INTRAVENOUS ONCE
Status: COMPLETED | OUTPATIENT
Start: 2021-12-13 | End: 2021-12-13

## 2021-12-13 RX ORDER — LIDOCAINE 40 MG/G
CREAM TOPICAL
Status: DISCONTINUED | OUTPATIENT
Start: 2021-12-13 | End: 2021-12-18 | Stop reason: HOSPADM

## 2021-12-13 RX ORDER — NICOTINE POLACRILEX 4 MG
15-30 LOZENGE BUCCAL
Status: DISCONTINUED | OUTPATIENT
Start: 2021-12-13 | End: 2021-12-18 | Stop reason: HOSPADM

## 2021-12-13 RX ORDER — SODIUM CHLORIDE 9 MG/ML
INJECTION, SOLUTION INTRAVENOUS CONTINUOUS
Status: DISCONTINUED | OUTPATIENT
Start: 2021-12-13 | End: 2021-12-13

## 2021-12-13 RX ORDER — ONDANSETRON 2 MG/ML
4 INJECTION INTRAMUSCULAR; INTRAVENOUS ONCE
Status: COMPLETED | OUTPATIENT
Start: 2021-12-13 | End: 2021-12-13

## 2021-12-13 RX ORDER — HYDROMORPHONE HYDROCHLORIDE 1 MG/ML
0.5 INJECTION, SOLUTION INTRAMUSCULAR; INTRAVENOUS; SUBCUTANEOUS EVERY 30 MIN PRN
Status: DISCONTINUED | OUTPATIENT
Start: 2021-12-13 | End: 2021-12-13

## 2021-12-13 RX ORDER — OXYMETAZOLINE HYDROCHLORIDE 0.05 G/100ML
2 SPRAY NASAL
Status: DISCONTINUED | OUTPATIENT
Start: 2021-12-13 | End: 2021-12-17 | Stop reason: CLARIF

## 2021-12-13 RX ORDER — PROCHLORPERAZINE 25 MG
25 SUPPOSITORY, RECTAL RECTAL EVERY 12 HOURS PRN
Status: DISCONTINUED | OUTPATIENT
Start: 2021-12-13 | End: 2021-12-18 | Stop reason: HOSPADM

## 2021-12-13 RX ORDER — HYDROMORPHONE HCL IN WATER/PF 6 MG/30 ML
.2-.5 PATIENT CONTROLLED ANALGESIA SYRINGE INTRAVENOUS
Status: DISCONTINUED | OUTPATIENT
Start: 2021-12-13 | End: 2021-12-15

## 2021-12-13 RX ORDER — ONDANSETRON 4 MG/1
4 TABLET, ORALLY DISINTEGRATING ORAL EVERY 6 HOURS PRN
Status: DISCONTINUED | OUTPATIENT
Start: 2021-12-13 | End: 2021-12-18 | Stop reason: HOSPADM

## 2021-12-13 RX ADMIN — ENOXAPARIN SODIUM 40 MG: 40 INJECTION SUBCUTANEOUS at 08:16

## 2021-12-13 RX ADMIN — HYDROMORPHONE HYDROCHLORIDE 0.5 MG: 1 INJECTION, SOLUTION INTRAMUSCULAR; INTRAVENOUS; SUBCUTANEOUS at 07:02

## 2021-12-13 RX ADMIN — HYDROMORPHONE HYDROCHLORIDE 0.5 MG: 0.2 INJECTION, SOLUTION INTRAMUSCULAR; INTRAVENOUS; SUBCUTANEOUS at 09:13

## 2021-12-13 RX ADMIN — INSULIN GLARGINE 6 UNITS: 100 INJECTION, SOLUTION SUBCUTANEOUS at 08:14

## 2021-12-13 RX ADMIN — HYDROMORPHONE HYDROCHLORIDE 0.5 MG: 1 INJECTION, SOLUTION INTRAMUSCULAR; INTRAVENOUS; SUBCUTANEOUS at 00:44

## 2021-12-13 RX ADMIN — DEXTROSE AND SODIUM CHLORIDE: 5; 450 INJECTION, SOLUTION INTRAVENOUS at 14:18

## 2021-12-13 RX ADMIN — ONDANSETRON 4 MG: 2 INJECTION INTRAMUSCULAR; INTRAVENOUS at 00:43

## 2021-12-13 RX ADMIN — ONDANSETRON 4 MG: 2 INJECTION INTRAMUSCULAR; INTRAVENOUS at 09:13

## 2021-12-13 RX ADMIN — HYDROMORPHONE HYDROCHLORIDE 0.5 MG: 1 INJECTION, SOLUTION INTRAMUSCULAR; INTRAVENOUS; SUBCUTANEOUS at 04:09

## 2021-12-13 RX ADMIN — SODIUM CHLORIDE: 9 INJECTION, SOLUTION INTRAVENOUS at 08:56

## 2021-12-13 ASSESSMENT — ACTIVITIES OF DAILY LIVING (ADL)
ADLS_ACUITY_SCORE: 8
WEAR_GLASSES_OR_BLIND: YES
ADLS_ACUITY_SCORE: 8
ADLS_ACUITY_SCORE: 6
DIFFICULTY_EATING/SWALLOWING: NO
ADLS_ACUITY_SCORE: 6
ADLS_ACUITY_SCORE: 8
ADLS_ACUITY_SCORE: 8
DRESSING/BATHING_DIFFICULTY: NO
DIFFICULTY_COMMUNICATING: NO
CONCENTRATING,_REMEMBERING_OR_MAKING_DECISIONS_DIFFICULTY: NO
ADLS_ACUITY_SCORE: 10
ADLS_ACUITY_SCORE: 6
DOING_ERRANDS_INDEPENDENTLY_DIFFICULTY: NO
ADLS_ACUITY_SCORE: 6
ADLS_ACUITY_SCORE: 6
ADLS_ACUITY_SCORE: 8
ADLS_ACUITY_SCORE: 8
HEARING_DIFFICULTY_OR_DEAF: NO
ADLS_ACUITY_SCORE: 10
ADLS_ACUITY_SCORE: 6
ADLS_ACUITY_SCORE: 6
VISION_MANAGEMENT: GLASSES
ADLS_ACUITY_SCORE: 10
ADLS_ACUITY_SCORE: 8
ADLS_ACUITY_SCORE: 10
FALL_HISTORY_WITHIN_LAST_SIX_MONTHS: NO
TOILETING_ISSUES: NO
ADLS_ACUITY_SCORE: 6
ADLS_ACUITY_SCORE: 6
ADLS_ACUITY_SCORE: 8
WALKING_OR_CLIMBING_STAIRS_DIFFICULTY: NO

## 2021-12-13 ASSESSMENT — MIFFLIN-ST. JEOR: SCORE: 1056.9

## 2021-12-13 NOTE — ED NOTES
"CT calls, concerned urine HCG not complete and that there is additional needle fromt he 1\" power port access. Pt returned to ER, needle changed to 3/4\", serum HCG ordered   "

## 2021-12-13 NOTE — CONSULTS
Hematology / Oncology  Consultation Note   Date of Service: 12/13/2021  Patient: Shira Joy  MRN: [id number]  Admission Date: 12/12/2021  Hospital Day # 0  Cancer Diagnosis: Metastatic gastric adenocarcinoma  Primary Outpatient Oncologist: Dr. Peterson ([place])  Current Treatment Plan: FOLFOX + trastuzumab + pembrolizumab    Recommendations:   - Appreciate surgical consultants input, would ideally receive venting G-tube, but appears this is unlikely to be an option  - Please plan for NG maintenance with suctioning at home if venting G-tube is not an option  - Please consult nutrition for TPN initiation  - For now, likely can use port for TPN administration, but may need PICC placement in the future  - Oncology will follow    Assessment & Plan:   Ms. Shira Joy is a 40yo with metastatic gastric adenocarcinoma with metastases to the liver, ovaries, and peritoneum treated with FOLFOX + trastuzumab + pembrolizumab and c/b autoimmune diabetes and recent SBO who presented recurrent abdominal pain and new emesis found to have recurrent SBO.    Diagnoses:  # Metastatic gastric adenocarcinoma  # Malignant SBO  # Autoimmune DM    Unfortunately presents with only brief resolution of her symptoms associated with SBO. Goal and hope was that she would remain without obstruction until her follow-up with oncology at [place]. She has understandably expressed a desire to continue therapy but maximize time at home. She will likely need an alternative chemotherapy regimen. In the interim, given how quickly her SBO recurred, we will need to proceed as if this will be an indefinite problem. Ideally, this would be managed with venting G-tube, though her malignant ascites and peritoneal carcinomatosis makes this challenging. Instead, we may need to consider NG with suctioning at home along with initiation of TPN.  ___________________________________________________________________    Oncologic History:  Oncologists  [place]:   Nicholas  [facility]: Dr. cShmidt     Diagnosis: Metastatic gastric adenocarcinoma     Treatment history (copied & updated from last [place] document in [facility]where on 10/14/21)     7/24/2021 Initial Diagnosis   She presented with few weeks of abdominal pain, which she attributed to her known fibroids. On 07/24/2021, she developed acute onset of severe abdominal pain prompting emergency department evaluation. CT of the abdomen revealed free air concerning for viscus perforation. She underwent exploratory laparotomy which revealed a perforated gastric ulcer. This was repaired with a Frank patch. Additionally, she was noted to have omental and peritoneal nodules which were biopsied. Intraoperative EGD revealed a large gastric mass which was also biopsied.    7/24/2021 Biopsy/Pathology   Gastric biopsy: Poorly differentiated adenocarcinoma with focal Signet ring formation. Immunohistochemical stain for Helicobacter pylori is negative.     Additional biopsies from the mesentery, gastrocolic ligament, and a left pelvic sidewall nodule were all consistent with metastatic poorly differentiated adenocarcinoma.    HER2 FISH was positive for HER2 amplification (HER2/D17Z! Ratio: 2.75)    PD-L1 and MMR testing were pending at the time of initial consultation.     9/3/2021 Biopsy/Pathology   PDL1, mismatch repair, and HER2 IHC on the initial 7/24 biopsy were delayed due to inadvertent cancellation of the tests initially.     Results were ultimately reported 9/2021:     PD-L1 CPS 10 (PD-L1 clone 22C3: tumor cells 0%, TILs 10%)    Mismatch repair intact (IHC)    HER2 1+ (IHC)    9/13/2021 Genetic Testing and Tumor Genotyping   Strong family history esophageal, gastric, colon, and other cancer. Clinical genetics consult obtained, and Common Hereditary Cancers Panel ([employer]) sent      Started on FOLFOX + trastuzumab + pembrolizumab in 08/2021. Plan at 10/14/21 follow-up was to continue this for an additional two months  given difficulty in interpreting her restaging scans (given lack of ideal timing for her initial scans).     Admission at [facility] 12/6-12/9/21 for malignant SBO. Resolved with conservative management.  ___________________________________________________________________    Patient was seen and plan of care was discussed with attending physician Dr. Barber.    Thank you for the opportunity to partake in this patients plan of care. Please do not hesitate to page with questions. We will continue to follow.     Jake Charlton MD PhD  Hematology/Oncology Fellow  Pager: [phone]  ___________________________________________________________________    HPI:      Ms. Shira Joy is a 42yo with metastatic gastric adenocarcinoma with metastases to the liver, ovaries, and peritoneum treated with FOLFOX + trastuzumab + pembrolizumab and c/b autoimmune diabetes and recent SBO who presented recurrent abdominal pain and new emesis found to have recurrent SBO.    Patient is known to the oncology service due to her recent admission for SBO. She did well during her recent admission with resolution of her abdominal pain almost immediately after NG decompression. She continued to pass flatus throughout her admission, was tolerating diet, and therefore discharged with the hope that her symptoms would remain at bay until her next appointment with oncology via [place].    She did well for a few days, then developed recurrent abdominal pain yesterday. The pain progressed and was c/b emesis x1. Last BM two days ago and continued to pass flatus -- similar to her prior presentation. She presented to the ED with imaging showing progressive SBO.    She discussed her case and recurrent hospitalization with her team at [facility], who reviewed her imaging and confirmed that she has progressive disease. They discussed various options, including obtaining a repeat bx for repeat HER2 testing along with other chemotherapy options.     Complete and  Comprehensive review of systems review and negative other than noted here or in the HPI.    Physical Exam:    Blood pressure 120/77, pulse 61, temperature 97  F (36.1  C), temperature source Oral, resp. rate 16, SpO2 98 %.    General: Thin, wfsoyrxwxti-kzx-azhihtfue F, in no acute distress  HEENT: NCAT. Anicteric sclera. NGT in place. MMM, no oral lesions.  Neck: Supple. No LAD. JVP wnl.  Lungs: Full and equal expansion. CTAB, no wheezes, rhonchi or rales.  CV: RRR. Normal S1, S2. No m/r/g.  Abd: BS+. Soft, non-distended and mildly tender without rebound or guarding.   Extremities: Warm and well-perfused. No gross malformations. No joint swelling. No edema.  Skin: No rashes or lesions.  Neuro: AOx3, answers questions appropriately. Face symmetric. Moves extremities equally and independently.    Labs & Studies: I personally reviewed the following studies:  ROUTINE LABS (Last four results):  CMP  Recent Labs   Lab 12/13/21  1148 12/13/21  0814 12/13/21  0702 12/12/21  2028 12/09/21  0807 12/08/21  0805 12/08/21  0743 12/07/21  0852 12/07/21  0752 12/07/21  0251 12/06/21  1538   NA  --   --   --  137  --   --  138  --  135  --  136   POTASSIUM  --   --  3.8 3.8  --   --  3.7  --  4.0  --  3.6   CHLORIDE  --   --   --  102  --   --  104  --  100  --  102   CO2  --   --   --  25  --   --  27  --  26  --  26   ANIONGAP  --   --   --  10  --   --  7  --  9  --  8   * 168*  --  146* 275*   < > 132*   < > 256*   < > 167*   BUN  --   --   --  11  --   --  6*  --  13  --  11   CR  --   --   --  0.52  --   --  0.56  --  0.60  --  0.47*   GFRESTIMATED  --   --   --  >90  --   --  >90  --  >90  --  >90   JORGE L  --   --   --  9.2  --   --  8.6  --  8.9  --  8.9   PROTTOTAL  --   --   --  7.5  --   --   --   --   --   --  6.7*   ALBUMIN  --   --   --  3.3*  --   --   --   --   --   --  3.0*   BILITOTAL  --   --   --  0.6  --   --   --   --   --   --  0.3   ALKPHOS  --   --   --  76  --   --   --   --   --   --  84   AST  --    --   --  23  --   --   --   --   --   --  28   ALT  --   --   --  32  --   --   --   --   --   --  65*    < > = values in this interval not displayed.     CBC  Recent Labs   Lab 12/12/21 2028 12/09/21 0723 12/08/21  0743 12/07/21  0752 12/06/21  1538   WBC 7.3  --  4.9 5.4 8.7   RBC 3.50*  --  3.13* 3.30* 3.65*   HGB 10.2* 9.3* 9.2* 9.7* 10.7*   HCT 30.9*  --  28.7* 29.9* 32.5*   MCV 88  --  92 91 89   MCH 29.1  --  29.4 29.4 29.3   MCHC 33.0  --  32.1 32.4 32.9   RDW 15.1*  --  14.7 15.0 14.6     --  239 283 304     INR  Recent Labs   Lab 12/06/21  1538   INR 1.04       Medications list for reference:  Current Facility-Administered Medications   Medication     enoxaparin ANTICOAGULANT (LOVENOX) injection 40 mg     HYDROmorphone (DILAUDID) injection 0.2-0.5 mg     insulin glargine (LANTUS PEN) injection 6 Units     lidocaine (LMX4) cream     lidocaine (XYLOCAINE) 4 % solution 5 mL     lidocaine 1 % 0.1-1 mL     LORazepam (ATIVAN) injection 0.5 mg     melatonin tablet 1 mg     ondansetron (ZOFRAN-ODT) ODT tab 4 mg    Or     ondansetron (ZOFRAN) injection 4 mg     oxymetazoline (AFRIN) 0.05 % spray 2 spray     prochlorperazine (COMPAZINE) injection 10 mg    Or     prochlorperazine (COMPAZINE) tablet 10 mg    Or     prochlorperazine (COMPAZINE) suppository 25 mg     sodium chloride (PF) 0.9% PF flush 3 mL     sodium chloride (PF) 0.9% PF flush 3 mL     sodium chloride 0.9% infusion

## 2021-12-13 NOTE — PROGRESS NOTES
General Surgery Progress Note  12/13/2021   ------------------------------------------------------------------------------------------------  Subjective:  Pt is experiencing mild abdominal discomfort, well controlled with pain meds. No n/v. Passing flatus, no BM. Able to walk to bathroom without difficulty. Voiding independently.    ------------------------------------------------------------------------------------------------  Objective:  Temp:  [99  F (37.2  C)] 99  F (37.2  C)  Pulse:  [84] 84  Resp:  [18] 18  BP: (120)/(96) 120/96  SpO2:  [93 %-100 %] 93 %    No intake/output data recorded.      Gen: Awake, interactive, NAD  Resp: breathing comfortably on room air  CV: regular rate, appears well perfused  Abd: soft, nondistended, nontender  Ext: palpable pulses, no edema     Labs:  Recent Labs   Lab 12/12/21 2028 12/09/21  0723 12/08/21  0743 12/07/21  0752   WBC 7.3  --  4.9 5.4   HGB 10.2* 9.3* 9.2* 9.7*     --  239 283       Recent Labs   Lab 12/12/21 2028 12/09/21  0807 12/08/21  2244 12/08/21  0805 12/08/21  0743 12/07/21  0852 12/07/21  0752     --   --   --  138  --  135   POTASSIUM 3.8  --   --   --  3.7  --  4.0   CHLORIDE 102  --   --   --  104  --  100   CO2 25  --   --   --  27  --  26   BUN 11  --   --   --  6*  --  13   CR 0.52  --   --   --  0.56  --  0.60   * 275* 155*   < > 132*   < > 256*   JORGE L 9.2  --   --   --  8.6  --  8.9    < > = values in this interval not displayed.       Imaging:  CT Abdomen Pelvis w Contrast 12/12 2304  IMPRESSION:   1.  Changes of mechanical small bowel obstruction with transition of small bowel seen in the right lower abdomen. Findings have progressed with increasing distention of the small bowel.     2.  No change in the gastric mass or the large multi lobulated inhomogeneous enhancing conglomerate masses in the pelvis.     3.  Stable 1.6 cm hypodense nodule dome of the liver.      =======================================================  Assessment/Plan:   41 year old female with h/o metastatic gastric adenocarcinoma who presents with recurrent malignant SBO with transition point in LUQ. Pt admitted 12/6 - 12/9 for the same, which resolved with non-operative management at that time. Given hx of malignancy pt is not a surgical candidate. Pt is passing flatus today.  -NPO  -NG decompression  -IVF: NS @ 125ml/hr  -Pain control: Dilaudid prn  - PPx: Lovenox       Seen, examined, and discussed with chief resident, who will discuss with staff.    Stephie Giles, MS3

## 2021-12-13 NOTE — PLAN OF CARE
Patient was admitted to 7C from the ED at appx noon. Patient has a bowel obstruction, NG tube is at [facility]. Patient is up ad earl, ambulated in halls. Voiding not saving, reports BM 2 days ago on the 11th. NG has scant output. Blood sugar at noon was 125. No pain currently, got dilaudid in the ED. Remains NPO.

## 2021-12-13 NOTE — H&P
[facility]    History and Physical - Hospitalist Service, Gold night       Date of Admission:  12/12/2021    Assessment & Plan      Shira Joy is a 41 year old female admitted on 12/12/2021. She has PMHx of metastatic gastric adenocarcinoma with liver and ovarian metastases and peritoneal carcinomatosis (diagnosed 7/2021, on treatment with FOLFOX plus trastuzumab and keydtruda since 8/25/21) and recent recurrent SBO (most recently admitted 12/6-12/9) presented with abdominal pain and vomiting found to have another episode of SBO.     Recurrent Small Bowel Obstruction  Gastric Adenocarcinoma with metastases to liver and ovaries, peritoneal carcinomatosis  She was doing well for the past 3 days since her 12/9 discharge until around 1pm on the day of admission. She has her primary oncologist appointment scheduled on 12/13. CT abdomen showed changes of mechanical small bowel obstruction with transition of small bowel seen in the right lower abdomen. Likely due to underlying malignant peritoneal disease. Hemodynamically stable. Surgery was consulted and recommended conservative management with NG tube decompression, IVF and pain management as well as consideration of palliative G tube placement. The patient would like to know how she could manage this at home so she does not need to be repeatedly admitted for SBO.  - NG tube placed, LIS  - NPO on IVF  ml/hr  - pain control w dilaudid 0.5mg IV q3h prn  - change PPI to IV  - oncology consulted to address her concern about recurrence and co-management    DM  Thought to be autoimmune DM1 given concurrent pembrolizumab treatment and low C-Peptide , slender body habitus. A/W autoimmune related hypothalamic, adrenal, thyroid, diabetes.  Normal adrenal, thyroid labs on last admission at [place]. During her recent admission, endocrine was consulted with recommendation to discharge patient on 12 units of Lantus daily, 1 unit  of short acting insulin for every 15 g of carbohydrates with meals and snacks, and continuation of her home sliding scale.    - decrease lantus to 6 units given NPO  Status  - low dose  Sliding scale insulin q4h     Underweight  Severe malnutrition  BMI of 16.47 likely in setting of her cancer        Diet: NPO for Medical/Clinical Reasons Except for: Meds    DVT Prophylaxis: Enoxaparin (Lovenox) SQ  Magana Catheter: Not present  Central Lines: None  Code Status:   full    Clinically Significant Risk Factors Present on Admission                   Disposition Plan   Expected Discharge: 12/17/2021  Anticipated discharge location:  Awaiting care coordination huddle  Delays:     resolution of SBO        The patient's care was discussed with the Bedside Nurse and Patient.    Rakel Grimes MD  [facility]  Securely message with the Vocera Web Console (learn more here)  Text page via [employer] Paging/Directory    Please see sign in/sign out for up to date coverage information    ______________________________________________________________________    Chief Complaint   Abdominal pain, vomiting    History is obtained from the patient    History of Present Illness   Shira Joy is a 41 year old female who has PMHx of metastatic gastric adenocarcinoma with liver and ovarian metastases and peritoneal carcinomatosis (diagnosed 7/2021, on treatment with FOLFOX plus trastuzumab and keydtruda since 8/25/21) c/b recent recurrent SBO (most recently admitted 12/6-12/9). She was discharged home on soft diet and was doing well until 12/12 around 1pm when she started to have constant abdominal pain. Her last BM was one day PTA, and as she was getting ready to come to the ED, she had a non bloody emesis. Given her recent admissions, she felt that she developed another SBO. The patient states that her main issue is to stay out of the hospital. She is worried that she may end up spending longer  time in hospital as SBO is happenining frequently, and hopes to discuss about the ways to get this managed as outpatient. She has heard of palliative G tube placement and is open to discussing further.     Review of Systems    The 10 point Review of Systems is negative other than noted in the HPI or here.     Past Medical History    I have reviewed this patient's medical history and updated it with pertinent information if needed.   Metastatic gastric adenocarcinoma w mets to ovaries and liver    Diabetes    Past Surgical History   I have reviewed this patient's surgical history and updated it with pertinent information if needed.  Ex lap, jo patch  Repair of perforated gastric ulcer excision of omental and peritoneal nodules  Social History   I have reviewed this patient's social history and updated it with pertinent information if needed.  Social History     Tobacco Use     Smoking status: Never Smoker     Smokeless tobacco: Never Used   Substance Use Topics     Alcohol use: None     Drug use: None       Family History     No significant family history    Prior to Admission Medications   Prior to Admission Medications   Prescriptions Last Dose Informant Patient Reported? Taking?   LORazepam (ATIVAN) 0.5 MG tablet   Yes No   Sig: Take 0.5 mg by mouth every 4 hours as needed for anxiety   acetaminophen 500 MG CAPS   Yes No   Sig: Take 1,000 mg by mouth   insulin  UNIT/ML injection   Yes No   Sig: Inject 5 Units Subcutaneous daily as needed for other (Take with steroid dose prior to chemotherapy)   insulin glargine (LANTUS PEN) 100 UNIT/ML pen   Yes No   Sig: Inject 12 Units Subcutaneous every morning   insulin lispro (HUMALOG KWIKPEN) 100 UNIT/ML (1 unit dial) KWIKPEN   Yes No   Sig: Inject 0-30 Units Subcutaneous 3 times daily (before meals) Per sliding scale, Max TDD = 45 units   insulin lispro (HUMALOG KWIKPEN) 100 UNIT/ML (1 unit dial) KWIKPEN   No No   Sig: Inject 1 Unit of Humalog for every 15 grams  of carbohydrates with meals and snacks as needed.   pantoprazole (PROTONIX) 40 MG EC tablet   Yes No      Facility-Administered Medications: None     Allergies   No Known Allergies    Physical Exam   Vital Signs: Temp: 99  F (37.2  C) Temp src: Oral BP: (!) 120/96 Pulse: 84   Resp: 18 SpO2: 93 % O2 Device: None (Room air)    Weight: 0 lbs 0 oz    General Appearance: alert and oriented not in acute distress  Eyes: conj pink  HEENT: mmm  Respiratory: unlabored, clear bilaterally  Cardiovascular: RRR no murmur  GI: distended, diminished bowel sound, mild tenderness R>L, no rebound  Skin:no edema  Musculoskeletal:no joint swelling  Neurologic: non focal  Psychiatric:stable mood    Data   Data reviewed today: I reviewed all medications, new labs and imaging results over the last 24 hours. I personally reviewed the abdominal CT image(s) showing dilated SB.    Recent Labs   Lab 12/12/21 2028 12/09/21  0807 12/09/21  0723 12/08/21  2244 12/08/21  0805 12/08/21  0743 12/07/21  0852 12/07/21  0752 12/07/21  0251 12/06/21  1538   WBC 7.3  --   --   --   --  4.9  --  5.4  --  8.7   HGB 10.2*  --  9.3*  --   --  9.2*  --  9.7*  --  10.7*   MCV 88  --   --   --   --  92  --  91  --  89     --   --   --   --  239  --  283  --  304   INR  --   --   --   --   --   --   --   --   --  1.04     --   --   --   --  138  --  135  --  136   POTASSIUM 3.8  --   --   --   --  3.7  --  4.0  --  3.6   CHLORIDE 102  --   --   --   --  104  --  100  --  102   CO2 25  --   --   --   --  27  --  26  --  26   BUN 11  --   --   --   --  6*  --  13  --  11   CR 0.52  --   --   --   --  0.56  --  0.60  --  0.47*   ANIONGAP 10  --   --   --   --  7  --  9  --  8   JORGE L 9.2  --   --   --   --  8.6  --  8.9  --  8.9   * 275*  --  155*   < > 132*   < > 256*   < > 167*   ALBUMIN 3.3*  --   --   --   --   --   --   --   --  3.0*   PROTTOTAL 7.5  --   --   --   --   --   --   --   --  6.7*   BILITOTAL 0.6  --   --   --   --   --   --   --    --  0.3   ALKPHOS 76  --   --   --   --   --   --   --   --  84   ALT 32  --   --   --   --   --   --   --   --  65*   AST 23  --   --   --   --   --   --   --   --  28   LIPASE  --   --   --   --   --   --   --   --   --  113    < > = values in this interval not displayed.     Recent Results (from the past 24 hour(s))   CT Abdomen Pelvis w Contrast    Narrative    EXAM: CT ABDOMEN PELVIS W CONTRAST  LOCATION: [facility]  DATE/TIME: 12/12/2021 9:31 PM    INDICATION: Abdominal pain, acute, nonlocalized.  COMPARISON: 12/06/2021.  TECHNIQUE: CT scan of the abdomen and pelvis was performed following injection of IV contrast. Multiplanar reformats were obtained. Dose reduction techniques were used.  CONTRAST: 57 ml Isovue 370.     FINDINGS:   LOWER CHEST: Breast implants.    HEPATOBILIARY: 1.6 cm hypodense solid lesion dome of the liver. Layering calcified gallstones.    PANCREAS: Normal.    SPLEEN: Normal.    ADRENAL GLANDS: Normal.    KIDNEYS/BLADDER: Normal.    BOWEL: Increasing distention of small bowel compatible with mechanical small bowel obstruction. Transition seen in the right lower abdomen. Distal small bowel decompressed. Again seen is wall thickening in the stomach with perigastric soft tissue.    LYMPH NODES: Normal.    VASCULATURE: Unremarkable.    PELVIC ORGANS: Large complex multilobulated solid enhancing pelvic mass with some cystic change which is stable. Stable ascites.    MUSCULOSKELETAL: Normal.      Impression    IMPRESSION:   1.  Changes of mechanical small bowel obstruction with transition of small bowel seen in the right lower abdomen. Findings have progressed with increasing distention of the small bowel.    2.  No change in the gastric mass or the large multi lobulated inhomogeneous enhancing conglomerate masses in the pelvis.    3.  Stable 1.6 cm hypodense nodule dome of the liver.   XR Abdomen Port 1 View    Narrative    EXAM: XR ABDOMEN PORT 1  VIEWS  LOCATION: [facility]  DATE/TIME: 12/13/2021 2:05 AM    INDICATION: post NG tube insertion  COMPARISON: None.      Impression    IMPRESSION: Partially visualized right-sided central venous catheter. Nasogastric tube terminates over the stomach. Excreted contrast in the renal collecting systems bilaterally. Changes consistent with small bowel obstruction redemonstrated.

## 2021-12-13 NOTE — CONSULTS
Emergency General Surgery Consultation    Shira Joy  MRN#: [id number]    Date of Admission:  12/12/2021    Date of Consult: 12/13/2021    Reason for consult: Partial small bowel obstruction in setting of metastatic gastric cancer   Requesting service: ED - Dr. Child          Assessment and Plan:   Assessment:   41-year-old female with metastatic gastric adenocarcinoma presenting with increasing abdominal pain and hard stools with CT of the abdomen demonstrates partial small bowel obstruction with transition point in the left upper quadrant, with moderate volume ascites      Plan:   - Recommend admission to Primary Medicine  - No acute surgical intervention  - Consider NG tube placement if patient has continued nausea  - NPO with IVF  - AM labs, electrolyte repletion  - Monitor abdominal exam  - Consideration of palliative G tube placement      EGS team to see in AM     Discussed with chief resident Dr. Georges; d/w EGS staff Dr. Brianna Cano MD  General Surgery, PGY-2  Department of Surgery          Chief Complaint:   Abdominal pain with nausea         History of Present Illness:   CC: abdominal pain, nausea, vomiting     History is obtained from the patient and EMR     41 year old female with PMH of metastatic gastric adenocarcinoma (diagnosed 7/2021, on treatment with FOLFOX plus trastuzumab and pembrolizumab (Keytruda) since 8/25/21 with liver and ovarian mets and peritoneal carcinomatosis and newly diagnosed autoimmune DMI d/t Keytruda presenting to the ED with complaint of abdominal pain.      Recently seen by Dr. Waters in clinic in 7/21 and deemed not a surgical candidate, also recently admitted to [facility] for nausea/vomitting/DM ketosis with BG was 423, beta hydroxybutyrate 4.3, and HgbA1c 7.2. 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 Endocrine was consulted and pt received DM education. She also  endorsed weight loss and fatigue from chemotherapy. Outside labs with significant hyperglycemia and she was directed to the ER. She was discharged on Lantus 15 units daily with prandial insulin sliding scale and daily NPH when she is getting steroids. Freestyle Ronn Sensor was placed. Pt seemed to have a good understanding of insulin at discharge, completed DM education with follow-up with Oncology.      She states she has been having intermittent abdominal cramping since 1 PM. She states that since then she has started to have nausea and vomiting X1.  She states the vomiting relieved some abdominal pressure. Patient's last bowel movement was yesterday and she does report passing gas today. She states she tried a capful of MiraLAX but with no relief.  Denies any blood in vomit or fever.  Denies dysuria.  Patient reports chronic lower back pain.  She now states that her pain is not as frequent as it was at 1 PM. Patient did have an NG tube while in the hospital. She states she has been doing well since her discharge, despite new diabetes diagnosis and diet change to strictly soft meals.      Here in the ED, Vitals WNL. Hgb 10.2. WBC 7.3. Electrolytes are within normal limits. CT of the abdomen demonstrates a continued bowel obstruction with transition point in the lower right quadrant.         Past Medical History:     Gastric adenocarcinoma with liver and ovarian metastasis on Keytruda, Herceptin, Steroids  Autoimmune DMI          Past Surgical History:     Prior midline ex-lap   x 2          Social History:       , three kids, 19,11,9; two daughters, one son  Lives in [place], [place]    Social History     Tobacco Use     Smoking status: Never Smoker     Smokeless tobacco: Never Used   Substance Use Topics     Alcohol use: Not on file             Family History:     Negative for bleeding disorders, clotting disorders, or problems with anesthesia.    History reviewed. No pertinent family history.              Allergies:   No Known Allergies          Medications:     No current facility-administered medications on file prior to encounter.  acetaminophen 500 MG CAPS, Take 1,000 mg by mouth  insulin glargine (LANTUS PEN) 100 UNIT/ML pen, Inject 12 Units Subcutaneous every morning  insulin lispro (HUMALOG KWIKPEN) 100 UNIT/ML (1 unit dial) KWIKPEN, Inject 1 Unit of Humalog for every 15 grams of carbohydrates with meals and snacks as needed.  insulin lispro (HUMALOG KWIKPEN) 100 UNIT/ML (1 unit dial) KWIKPEN, Inject 0-30 Units Subcutaneous 3 times daily (before meals) Per sliding scale, Max TDD = 45 units  insulin  UNIT/ML injection, Inject 5 Units Subcutaneous daily as needed for other (Take with steroid dose prior to chemotherapy)  LORazepam (ATIVAN) 0.5 MG tablet, Take 0.5 mg by mouth every 4 hours as needed for anxiety  pantoprazole (PROTONIX) 40 MG EC tablet,               Review of Systems:   10-point ROS otherwise negative except as noted above.          Physical Exam:     Temp:  [99  F (37.2  C)] 99  F (37.2  C)  Pulse:  [84] 84  Resp:  [18] 18  BP: (120)/(96) 120/96  SpO2:  [93 %-100 %] 93 %     General: Alert, interactive, cachectic   Resp: NLB on RA  Cardiac: RRR  Abdomen: Soft, minimally tender to palpation LLQ, nondistended. No rebound or guarding.  Well healed midline incision (verticle)  Extremities: No LE edema or obvious joint abnormalities  Skin: Warm and dry, no jaundice or rash  Neuro: A&Ox3, CN 2-12 intact, VILLALOBOS    No intake/output data recorded.          Data:   Labs:  Arterial Blood Gases   Recent Labs   Lab 12/06/21  1543   PH 7.44*        Complete Blood Count   Recent Labs   Lab 12/12/21 2028 12/09/21  0723 12/08/21  0743 12/07/21  0752 12/06/21  1538   WBC 7.3  --  4.9 5.4 8.7   HGB 10.2* 9.3* 9.2* 9.7* 10.7*     --  239 283 304       Basic Metabolic Panel  Recent Labs   Lab 12/12/21 2028 12/09/21  0807 12/08/21  2244 12/08/21  1918 12/08/21  0805 12/08/21  0743  12/07/21  0852 12/07/21  0752 12/07/21  0251 12/06/21  1538     --   --   --   --  138  --  135  --  136   POTASSIUM 3.8  --   --   --   --  3.7  --  4.0  --  3.6   CHLORIDE 102  --   --   --   --  104  --  100  --  102   CO2 25  --   --   --   --  27  --  26  --  26   BUN 11  --   --   --   --  6*  --  13  --  11   CR 0.52  --   --   --   --  0.56  --  0.60  --  0.47*   * 275* 155* 77   < > 132*   < > 256*   < > 167*   JORGE L 9.2  --   --   --   --  8.6  --  8.9  --  8.9    < > = values in this interval not displayed.       Liver Function Tests  Recent Labs   Lab 12/12/21 2028 12/06/21  1538   AST 23 28   ALT 32 65*   ALKPHOS 76 84   BILITOTAL 0.6 0.3   ALBUMIN 3.3* 3.0*       Pancreatic Enzymes  Recent Labs   Lab 12/06/21  1538   LIPASE 113       Coagulation Profile  Recent Labs   Lab 12/06/21  1538   INR 1.04       Lactate  Recent Labs   Lab 12/06/21  1543   LACT 1.3       Imaging:   Results for orders placed or performed during the hospital encounter of 12/12/21   CT Abdomen Pelvis w Contrast    Narrative    EXAM: CT ABDOMEN PELVIS W CONTRAST  LOCATION: [facility]  DATE/TIME: 12/12/2021 9:31 PM    INDICATION: Abdominal pain, acute, nonlocalized.  COMPARISON: 12/06/2021.  TECHNIQUE: CT scan of the abdomen and pelvis was performed following injection of IV contrast. Multiplanar reformats were obtained. Dose reduction techniques were used.  CONTRAST: 57 ml Isovue 370.     FINDINGS:   LOWER CHEST: Breast implants.    HEPATOBILIARY: 1.6 cm hypodense solid lesion dome of the liver. Layering calcified gallstones.    PANCREAS: Normal.    SPLEEN: Normal.    ADRENAL GLANDS: Normal.    KIDNEYS/BLADDER: Normal.    BOWEL: Increasing distention of small bowel compatible with mechanical small bowel obstruction. Transition seen in the right lower abdomen. Distal small bowel decompressed. Again seen is wall thickening in the stomach with perigastric soft tissue.    LYMPH  NODES: Normal.    VASCULATURE: Unremarkable.    PELVIC ORGANS: Large complex multilobulated solid enhancing pelvic mass with some cystic change which is stable. Stable ascites.    MUSCULOSKELETAL: Normal.      Impression    IMPRESSION:   1.  Changes of mechanical small bowel obstruction with transition of small bowel seen in the right lower abdomen. Findings have progressed with increasing distention of the small bowel.    2.  No change in the gastric mass or the large multi lobulated inhomogeneous enhancing conglomerate masses in the pelvis.    3.  Stable 1.6 cm hypodense nodule dome of the liver.

## 2021-12-13 NOTE — ED TRIAGE NOTES
"Patient presents ambulatory to triage with complaints of sharp abdominal pain that started around 1300 today. Patient reports that since then she has started having nausea and some vomiting. Patient recently in the hospital and was treat for \"an obstruction\" and reports that she feels these symptoms are consistent with an obstruction. Last bowel movement yesterday and patient does report that she is passing some gas.   "

## 2021-12-13 NOTE — PROGRESS NOTES
Brief hospital progress note    Patient with improving pain overnight. Seen with  in room.  Nausea and vomiting have improved.  No blood noted per any orifice.  No fever or chills.  No chest pain or shortness of breath. Wondering about the G-tube plan.  In the setting of her diabetes and n.p.o. status alongside necessity for fluids will consult endocrine, appreciate recs. Will also consult nutrition in the setting of severe malnutrition and possible TPN use depending on progression.  Will continue with NG tube, n.p.o. status, pain control with Dilaudid, and also consult oncology for further recommendations.

## 2021-12-13 NOTE — PROGRESS NOTES
Admitted/transferred from: ED  2 RN full   skin assessment completed by Rox Cortez, RN and Gabriella Dawn RN.  Skin assessment finding: skin intact, no problems   Interventions/actions: skin interventions Continue to monitor, bony spine but ambulates frequently     Will continue to monitor.

## 2021-12-13 NOTE — ED PROVIDER NOTES
[facility] EMERGENCY DEPARTMENT ([facility])  12/12/21 ED05  History   No chief complaint on file.    The history is provided by the patient and medical records.     Shira Joy is a 41 year old female with a past medical history of gastric adenocarcinoma with metastases to liver and ovaries (diagnosed 7/2021, on treatment with FOLFOX plus trastuzumab and keydtruda since 8/25/21), diabetes (humalog and Lantus) , and recent small bowel obstruction who presents to the emergency department for evaluation of abdominal pain.  She states she has been having intermittent abdominal cramping since 1 PM and ever since then she has started to have nausea and vomiting X1.  She states the vomiting relieved some abdominal pressure.  Patient's last bowel movement was yesterday and she does report passing gas today.  She tried a capful of MiraLAX but with no relief.  Denies any blood in vomit or fever.  Denies dysuria.  Patient reports chronic lower back pain.  She now states that her pain is not as frequent as it was at 1 PM. Patient did have an NG tube while in the hospital. She states she has been doing well since her discharge, despite new diabetes diagnosis and diet change to strictly soft meals.     Per chart review patient was hospitalized from 12/6-12/9 for small bowel obstruction.  Surgery was consulted but no acute surgical intervention was recommended; they recommended conservative management. Patient was able to tolerate a regular diet with resolution of her small bowel obstruction. G tube placement was advised. No further changes to her metastases at that time.     Past Medical History  History reviewed. No pertinent past medical history.  History reviewed. No pertinent surgical history.  acetaminophen 500 MG CAPS  insulin glargine (LANTUS PEN) 100 UNIT/ML pen  insulin lispro (HUMALOG KWIKPEN) 100 UNIT/ML (1 unit dial) KWIKPEN  insulin lispro (HUMALOG KWIKPEN) 100 UNIT/ML (1 unit dial) KWIKPEN  insulin   UNIT/ML injection  LORazepam (ATIVAN) 0.5 MG tablet  pantoprazole (PROTONIX) 40 MG EC tablet      No Known Allergies  Family History  History reviewed. No pertinent family history.  Social History   Social History     Tobacco Use     Smoking status: Never Smoker     Smokeless tobacco: Never Used   Substance Use Topics     Alcohol use: None     Drug use: None      Past medical history, past surgical history, medications, allergies, family history, and social history were reviewed with the patient. No additional pertinent items.       Review of Systems  A complete review of systems was performed with pertinent positives and negatives noted in the HPI, and all other systems negative.    Physical Exam   BP: (!) 120/96  Pulse: 84  Temp: 99  F (37.2  C)  Resp: 18  SpO2: 100 %  Physical Exam  General: Alert, lying on the bed in NAD  Neuro: Awake alert; moving extremities x 4 face symterical  Eyes: sclera nonicteric conjunctiva clear  Mouth: mmm  Heart: RRR  Lungs:  CTA bilaterally  Abdomen:  Soft, tenderness to palpation diffuse,+ guarding  Back: No CVA tenderness  Extremities: no pedal edema      ED Course     7:49 PM  The patient was seen and examined by Amy Child MD in Room 05.         Results for orders placed or performed during the hospital encounter of 12/12/21   CT Abdomen Pelvis w Contrast     Status: None    Narrative    EXAM: CT ABDOMEN PELVIS W CONTRAST  LOCATION: [facility]  DATE/TIME: 12/12/2021 9:31 PM    INDICATION: Abdominal pain, acute, nonlocalized.  COMPARISON: 12/06/2021.  TECHNIQUE: CT scan of the abdomen and pelvis was performed following injection of IV contrast. Multiplanar reformats were obtained. Dose reduction techniques were used.  CONTRAST: 57 ml Isovue 370.     FINDINGS:   LOWER CHEST: Breast implants.    HEPATOBILIARY: 1.6 cm hypodense solid lesion dome of the liver. Layering calcified gallstones.    PANCREAS: Normal.    SPLEEN:  Normal.    ADRENAL GLANDS: Normal.    KIDNEYS/BLADDER: Normal.    BOWEL: Increasing distention of small bowel compatible with mechanical small bowel obstruction. Transition seen in the right lower abdomen. Distal small bowel decompressed. Again seen is wall thickening in the stomach with perigastric soft tissue.    LYMPH NODES: Normal.    VASCULATURE: Unremarkable.    PELVIC ORGANS: Large complex multilobulated solid enhancing pelvic mass with some cystic change which is stable. Stable ascites.    MUSCULOSKELETAL: Normal.      Impression    IMPRESSION:   1.  Changes of mechanical small bowel obstruction with transition of small bowel seen in the right lower abdomen. Findings have progressed with increasing distention of the small bowel.    2.  No change in the gastric mass or the large multi lobulated inhomogeneous enhancing conglomerate masses in the pelvis.    3.  Stable 1.6 cm hypodense nodule dome of the liver.   Comprehensive metabolic panel     Status: Abnormal   Result Value Ref Range    Sodium 137 133 - 144 mmol/L    Potassium 3.8 3.4 - 5.3 mmol/L    Chloride 102 94 - 109 mmol/L    Carbon Dioxide (CO2) 25 20 - 32 mmol/L    Anion Gap 10 3 - 14 mmol/L    Urea Nitrogen 11 7 - 30 mg/dL    Creatinine 0.52 0.52 - 1.04 mg/dL    Calcium 9.2 8.5 - 10.1 mg/dL    Glucose 146 (H) 70 - 99 mg/dL    Alkaline Phosphatase 76 40 - 150 U/L    AST 23 0 - 45 U/L    ALT 32 0 - 50 U/L    Protein Total 7.5 6.8 - 8.8 g/dL    Albumin 3.3 (L) 3.4 - 5.0 g/dL    Bilirubin Total 0.6 0.2 - 1.3 mg/dL    GFR Estimate >90 >60 mL/min/1.73m2   CBC with platelets and differential     Status: Abnormal   Result Value Ref Range    WBC Count 7.3 4.0 - 11.0 10e3/uL    RBC Count 3.50 (L) 3.80 - 5.20 10e6/uL    Hemoglobin 10.2 (L) 11.7 - 15.7 g/dL    Hematocrit 30.9 (L) 35.0 - 47.0 %    MCV 88 78 - 100 fL    MCH 29.1 26.5 - 33.0 pg    MCHC 33.0 31.5 - 36.5 g/dL    RDW 15.1 (H) 10.0 - 15.0 %    Platelet Count 250 150 - 450 10e3/uL    % Neutrophils 82 %     % Lymphocytes 11 %    % Monocytes 7 %    % Eosinophils 0 %    % Basophils 0 %    % Immature Granulocytes 0 %    NRBCs per 100 WBC 0 <1 /100    Absolute Neutrophils 5.9 1.6 - 8.3 10e3/uL    Absolute Lymphocytes 0.8 0.8 - 5.3 10e3/uL    Absolute Monocytes 0.5 0.0 - 1.3 10e3/uL    Absolute Eosinophils 0.0 0.0 - 0.7 10e3/uL    Absolute Basophils 0.0 0.0 - 0.2 10e3/uL    Absolute Immature Granulocytes 0.0 <=0.4 10e3/uL    Absolute NRBCs 0.0 10e3/uL   HCG qualitative pregnancy (blood)     Status: Normal   Result Value Ref Range    hCG Serum Qualitative Negative Negative   CBC with platelets differential     Status: Abnormal    Narrative    The following orders were created for panel order CBC with platelets differential.  Procedure                               Abnormality         Status                     ---------                               -----------         ------                     CBC with platelets and d...[[id number]]  Abnormal            Final result                 Please view results for these tests on the individual orders.     Medications   HYDROmorphone (PF) (DILAUDID) injection 0.5 mg (0.5 mg Intravenous Given 12/12/21 2123)   ondansetron (ZOFRAN) injection 4 mg (has no administration in time range)   HYDROmorphone (PF) (DILAUDID) injection 0.5 mg (has no administration in time range)   oxymetazoline (AFRIN) 0.05 % spray 2 spray (has no administration in time range)   lidocaine (XYLOCAINE) 4 % solution 5 mL (has no administration in time range)   ondansetron (ZOFRAN) injection 4 mg (4 mg Intravenous Given 12/12/21 2028)   iopamidol (ISOVUE-370) solution 57 mL (57 mLs Intravenous Given 12/12/21 2255)   sodium chloride (PF) 0.9% PF flush 64 mL (64 mLs Intravenous Given 12/12/21 2259)        Assessments & Plan (with Medical Decision Making)   Mechanical Small Bowel Obstruction  -Soon after arrival, IV was established, patient's pain was treated with IV Dilaudid, and Zofran was given for nausea.  Given  patient's history and symptoms, they are most consistent with small bowel obstruction, so discussed risks and benefits of repeat CT scan to evaluate.  CT scan showed small bowel obstruction with transition point in the right lower abdomen, with distention worse than her last CT.    Qualitative hCG was negative, urinalysis is pending, CBC was unremarkable with the exception of the mild anemia with a hemoglobin at 10.2, basic metabolic panel showed slightly elevated glucose at 146.    Consulted surgery, but with patient's adenocarcinoma, they stated surgery is not an option.  We will attempt for conservative treatment with NG tube placement, analgesia, IV fluids.  Patient is in agreement with this plan.    Given history, exam, and workup in the emergency department, patient is unsafe to be discharged to home at this time and will be admitted to the hospital for further evaluation and treatment.         I have reviewed the nursing notes. I have reviewed the findings, diagnosis, plan and need for follow up with the patient.    New Prescriptions    No medications on file       Final diagnoses:   None     ISusanna, am serving as a trained medical scribe to document services personally performed by Amy Child MD based on the provider's statements to me on December 12, 2021.  This document has been checked and approved by the attending provider.    IAmy MD, was physically present and have reviewed and verified the accuracy of this note documented by Susanna Sims medical scribe.      Amy Child MD  --  Amy Child MD  [facility] EMERGENCY DEPARTMENT  12/12/2021     Amy Child MD  12/13/21 0037

## 2021-12-13 NOTE — CONSULTS
"In-Patient Diabetes/Hyperglycemia Management Consult    Chief Complaint:  \"Type 1, NPO, SBO, fluid selection, recent admission\"    Consult requested by: Moe Ridley MD    All information gathered via chart review and face-to-face interview and assessment    History of Present Illness:  Shira Joy is a 41 year old female admitted on 12/12/2021. She has PMHx of metastatic gastric adenocarcinoma with liver and ovarian metastases and peritoneal carcinomatosis (diagnosed 7/2021, on treatment with FOLFOX plus trastuzumab and keydtruda since 8/25/21) and recent recurrent SBO (most recently admitted 12/6-12/9) presented with abdominal pain and vomiting found to have another episode of SBO.    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  She presented to [facility] on 11/23 and subsequently admitted to [facility] with thirst, dizziness, hot flashes, N/V and abd pain as well as weight loss and fatigue 2/2 to chemotherapy.  During the ED work up, Shiar was found to have a BG of 423, beta hydroxybutyrate 4.3, and HgbA1c 7.2. Anion gap 13.  Endocrine consult and diabetes education completed and she was discharged on Lantus 15 unit(s) with prandial insulin sliding scale and daily NPH with steroids. She was started on a Freestyle Ronn.     Shira was readmitted to [facility] for abd pain, SBO, gastric adenocarcinoma with mets to liver, ovaries, peritoneal carcinamatosis 12/6/2021 and seen by IDS on 12/7/2021:  At that time, she reported she had been doing lantus 15 units at night and would get low BG overnight, 1:10 for carb coverage meals/snacks and 1/50 above 150 mg/dl TID AC , she wasn't taking much for at bedtime as she would eat late and " "she would just do her dinnertime at that point. She feels low as being sweaty and feeling off. She also mentions she is anxious that she will go low as sometimes would give herself humalog and then not being able to eat.     Diabetes team recommended:   Lantus 12 unit(s) daily  Aspart 1:50> 140 TID AC and 1:50>200 HS  Aspart  Unit(s) per 15 g cho coverage w meals/snacks    BRYANT, insulin Ab, islet cell ab - collected at that time and have since resulted:    Ref Range & Units 5 d ago   Glutamic Acid Decarboxylase Antibody 0.0 - 5.0 IU/mL <5.0                     Ref Range & Units 5 d ago   Islet Cell Antibody IgG <1:4 <1:4     Comment: INTERPRETIVE INFORMATION: Islet Cell Ab, IgG            Ref Range & Units 5 d ago    Insulin Antibodies 0.0 - 0.4 U/mL <0.4        Labs:  Covid - negative    Na/K - wnl  CO2/AG - wnl  Creat 0.52/GFR >90     BG trend since arrival:      Patient and  not in a place to discuss regimen and what she has been doing at home for her diabetes.  They wish to talk more \"tomorrow\".  Hem/Onc also in room - see their detailed note for potential planning re: SBO and nutrition.   Will remain NPO for now with q4h BG checks, D5%1/2 NS ordered in the event of low trending BG.  Patient/ aware this regimen needed since NPO and lantus on board. Patient requires insulin to prevent DKA.   Best plan is likely to run Dextrose fluids with IV insulin.     Last dose insulin:  Glargine 6 unit(s) given this AM at 0814 and is NPO    Confounding factors:  No steroids - there is talk or possibly either TF or TPN or both.  For now, NPO.     ELOS:  TBD    Diabetes:  Recent Labs   Lab 12/13/21  1148 12/13/21  0814 12/12/21 2028 12/09/21  0807 12/08/21  2244 12/08/21  1918   * 168* 146* 275* 155* 77       Diabetes Type:  Diabetes from ICI therapy likely Autoimmune DM  Diabetes Duration: <6 months (11/23/2021 approximately)    Usual Diabetes Regimen:     PTA Medications:   Per documentation from " IDS on 12/8  Lantus 12 unit(s) daily  Aspart 1:50> 140 TID AC and 1:50>200 HS  Aspart  Unit(s) per 15 g cho coverage w meals/snacks    BG monitoring frequency:  Has Freestyle Ronn - will need clarification on how freq she is checking BG    Diet: unk intake  Safety Kit: unk  Medic Alert: unk    Ability to [place] Prescribed Regimen: TBD     Other active medical problems:  SBO, metastatic gastric adenocarcinoma with liver and ovarian metastases and peritoneal carcinomatosis     Diabetes Control:   Lab Results   Component Value Date    A1C 7.1 12/07/2021     Diabetes Complications: unk  History of DKA:   Yes, diagnosis 11/2021  Able to Detect Hypoglycemia: yes  Usual Diabetes Care Provider: Dr. Lopez, [facility]  Oncologists  [place]: Dr. Peterson  [place] [facility]: Dr. Schmidt    Factors Impacting Glucose Control: +acute illness, +diet/eating changes (NPO) - potential for TF/TPN    Review of Systems:    CC:  abd pain, nausea - improved    Past medical, family and social histories are reviewed and updated.    Past Medical History  History reviewed. No pertinent past medical history.    Family History  History reviewed. No pertinent family history.     No Family hx of diabetes or thyroid disorder      Social History  Social History     Socioeconomic History     Marital status:      Spouse name: None     Number of children: None     Years of education: None     Highest education level: None   Occupational History     None   Tobacco Use     Smoking status: Never Smoker     Smokeless tobacco: Never Used   Substance and Sexual Activity     Alcohol use: None     Drug use: None     Sexual activity: None   Other Topics Concern     None   Social History Narrative     None     Social Determinants of Health     Financial Resource Strain: Not on file   Food Insecurity: Not on file   Transportation Needs: Not on file   Physical Activity: Not on file   Stress: Not on file   Social Connections: Not on file   Intimate Partner  Violence: Not on file   Housing Stability: Not on file     Hx of use of THC for chemotherapy but has since stopped.     Physical Exam:    /70   Pulse 63   Temp 97.6  F (36.4  C) (Oral)   Resp 16   SpO2 98%       General:   resting in bed, overwhelmed, tearful -  at bedside. NG in place.  HEENT: NC/AT, PER and anicteric, non-injected, oral mucous membranes moist.   Lungs: non-labored, no cough, no SOB  ABD: thin  Skin: warm and dry, no obvious lesions  MSK:  fluid movement of all extremities  Mental status:  alert, oriented x3, communicating clearly  Psych: tearful, appropriate affect      Laboratory  Recent Labs   Lab Test 12/13/21  1148 12/13/21  0814 12/13/21  0702 12/12/21 2028 12/08/21  0805 12/08/21  0743   NA  --   --   --  137  --  138   POTASSIUM  --   --  3.8 3.8  --  3.7   CHLORIDE  --   --   --  102  --  104   CO2  --   --   --  25  --  27   ANIONGAP  --   --   --  10  --  7   * 168*  --  146*   < > 132*   BUN  --   --   --  11  --  6*   CR  --   --   --  0.52  --  0.56   JORGE L  --   --   --  9.2  --  8.6    < > = values in this interval not displayed.     CBC RESULTS:   Recent Labs   Lab Test 12/12/21 2028   WBC 7.3   RBC 3.50*   HGB 10.2*   HCT 30.9*   MCV 88   MCH 29.1   MCHC 33.0   RDW 15.1*          Liver Function Studies -   Recent Labs   Lab Test 12/12/21 2028   PROTTOTAL 7.5   ALBUMIN 3.3*   BILITOTAL 0.6   ALKPHOS 76   AST 23   ALT 32       Active Medications  Current Facility-Administered Medications   Medication     dextrose 5% and 0.45% NaCl infusion     enoxaparin ANTICOAGULANT (LOVENOX) injection 40 mg     HYDROmorphone (DILAUDID) injection 0.2-0.5 mg     insulin aspart (NovoLOG) injection (RAPID ACTING)     insulin glargine (LANTUS PEN) injection 6 Units     lidocaine (LMX4) cream     lidocaine (XYLOCAINE) 4 % solution 5 mL     lidocaine 1 % 0.1-1 mL     LORazepam (ATIVAN) injection 0.5 mg     melatonin tablet 1 mg     ondansetron (ZOFRAN-ODT) ODT tab 4 mg     Or     ondansetron (ZOFRAN) injection 4 mg     oxymetazoline (AFRIN) 0.05 % spray 2 spray     prochlorperazine (COMPAZINE) injection 10 mg    Or     prochlorperazine (COMPAZINE) tablet 10 mg    Or     prochlorperazine (COMPAZINE) suppository 25 mg     sodium chloride (PF) 0.9% PF flush 3 mL     sodium chloride (PF) 0.9% PF flush 3 mL     sodium chloride 0.9% infusion     No current outpatient medications on file.       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  New diagnosis (11/2021).  Negative BRYANT ab, insulin Ab, and islet cell Ab.    2)  SBO; NG placed  3)  Gastric adenocarcinoma with mets to liver, ovaries, peritoneal carcinamatosis   4)  Nausea/Vomiting; resolving      Plan:      -  Lantus 6 unit(s) given this AM - will hold AM dose for reassessment     -  Aspart - no prandial coverage needed at this time (NPO)    -  PRN IV insulin preferred vs sliding scale insulin and to be started for BG >180 x2 consecutive checks to be given concurrrently with D5% fluids.     -  D5%/1/2NS at 75 ml until BG > 120 then can reduce to 50 ml/hr    -  BG monitoring q4h while NPO - will increase to q1-2 hr per protocol if IV insulin initiated - target 120-180    -  Hypoglycemia protocol    -  Will follow - remaining consult data to be collected tomorrow per patients wish.       DEBBI Torres CNP   Inpatient Diabetes Management Service  Pager - [phone]  Friday - Monday 0800 - 1600 hrs    To contact Endocrine Diabetes service:   From 8AM-4PM: page inpatient diabetes provider that is following the patient that day (see filed or incomplete progress notes/consult notes).  If uncertain of provider assignment: page job code [phone].  For questions or updates from 4PM-8AM: page the diabetes job code for on call fellow: [phone]    Please notify inpatient diabetes service if changes are planned to steroids,  nutrition, or if procedures are planned requiring prolonged NPO status.Diabetes Management Team job code: [phone]       I spent a total of 80 minutes bedside and on the inpatient unit managing glycemic care.  Over 50% of my time on the unit was spent counseling the patient and/or coordinating care regarding acute hyperglycemic management.  See note for details.

## 2021-12-13 NOTE — PROGRESS NOTES
Surgery Brief Progress Note  12/13/2021    The CT images were reviewed with Dr. Bryant. She has multiple loops of distended, fecalized small bowel as well as ascites likely related to her malignancy. Her metastatic cancer precludes surgical intervention for her small bowel obstruction. Given her ascites and metastatic disease, she is also a poor candidate for a venting gastrostomy tube. At this time would recommend continued NG decompression until she is having clear antegrade bowel function. Once a diet can be started, recommend keeping her to a liquid diet to prevent recurrent SBO. Unfortunately, these obstructive symptoms will continue to be an issue for her and there is no good surgical option. The surgery team will follow peripherally, please call with any questions.    Romie Georges MD  PGY-5, General Surgery

## 2021-12-14 ENCOUNTER — HOME INFUSION (PRE-WILLOW HOME INFUSION) (OUTPATIENT)
Dept: PHARMACY | Facility: CLINIC | Age: 41
End: 2021-12-14
Payer: COMMERCIAL

## 2021-12-14 LAB
ANION GAP SERPL CALCULATED.3IONS-SCNC: 10 MMOL/L (ref 3–14)
BILIRUB DIRECT SERPL-MCNC: 0.2 MG/DL (ref 0–0.2)
BUN SERPL-MCNC: 10 MG/DL (ref 7–30)
CALCIUM SERPL-MCNC: 8.6 MG/DL (ref 8.5–10.1)
CHLORIDE BLD-SCNC: 103 MMOL/L (ref 94–109)
CO2 SERPL-SCNC: 24 MMOL/L (ref 20–32)
CREAT SERPL-MCNC: 0.58 MG/DL (ref 0.52–1.04)
ERYTHROCYTE [DISTWIDTH] IN BLOOD BY AUTOMATED COUNT: 14.8 % (ref 10–15)
GFR SERPL CREATININE-BSD FRML MDRD: >90 ML/MIN/1.73M2
GLUCOSE BLD-MCNC: 192 MG/DL (ref 70–99)
GLUCOSE BLDC GLUCOMTR-MCNC: 191 MG/DL (ref 70–99)
GLUCOSE BLDC GLUCOMTR-MCNC: 194 MG/DL (ref 70–99)
GLUCOSE BLDC GLUCOMTR-MCNC: 213 MG/DL (ref 70–99)
GLUCOSE BLDC GLUCOMTR-MCNC: 215 MG/DL (ref 70–99)
GLUCOSE BLDC GLUCOMTR-MCNC: 230 MG/DL (ref 70–99)
GLUCOSE BLDC GLUCOMTR-MCNC: 282 MG/DL (ref 70–99)
GLUCOSE BLDC GLUCOMTR-MCNC: 287 MG/DL (ref 70–99)
GLUCOSE BLDC GLUCOMTR-MCNC: 93 MG/DL (ref 70–99)
HCT VFR BLD AUTO: 29.8 % (ref 35–47)
HGB BLD-MCNC: 9.3 G/DL (ref 11.7–15.7)
MAGNESIUM SERPL-MCNC: 2.1 MG/DL (ref 1.6–2.3)
MCH RBC QN AUTO: 29.2 PG (ref 26.5–33)
MCHC RBC AUTO-ENTMCNC: 31.2 G/DL (ref 31.5–36.5)
MCV RBC AUTO: 94 FL (ref 78–100)
PHOSPHATE SERPL-MCNC: 3.3 MG/DL (ref 2.5–4.5)
PLATELET # BLD AUTO: 227 10E3/UL (ref 150–450)
POTASSIUM BLD-SCNC: 3.8 MMOL/L (ref 3.4–5.3)
RBC # BLD AUTO: 3.18 10E6/UL (ref 3.8–5.2)
SODIUM SERPL-SCNC: 137 MMOL/L (ref 133–144)
TRIGL SERPL-MCNC: 175 MG/DL
WBC # BLD AUTO: 3.3 10E3/UL (ref 4–11)

## 2021-12-14 PROCEDURE — 84478 ASSAY OF TRIGLYCERIDES: CPT | Performed by: HOSPITALIST

## 2021-12-14 PROCEDURE — 99233 SBSQ HOSP IP/OBS HIGH 50: CPT | Performed by: NURSE PRACTITIONER

## 2021-12-14 PROCEDURE — 258N000001 HC RX 258: Performed by: NURSE PRACTITIONER

## 2021-12-14 PROCEDURE — 83735 ASSAY OF MAGNESIUM: CPT | Performed by: INTERNAL MEDICINE

## 2021-12-14 PROCEDURE — 36591 DRAW BLOOD OFF VENOUS DEVICE: CPT | Performed by: INTERNAL MEDICINE

## 2021-12-14 PROCEDURE — 3E0436Z INTRODUCTION OF NUTRITIONAL SUBSTANCE INTO CENTRAL VEIN, PERCUTANEOUS APPROACH: ICD-10-PCS | Performed by: HOSPITALIST

## 2021-12-14 PROCEDURE — 85027 COMPLETE CBC AUTOMATED: CPT | Performed by: INTERNAL MEDICINE

## 2021-12-14 PROCEDURE — 82310 ASSAY OF CALCIUM: CPT | Performed by: INTERNAL MEDICINE

## 2021-12-14 PROCEDURE — 99233 SBSQ HOSP IP/OBS HIGH 50: CPT | Performed by: HOSPITALIST

## 2021-12-14 PROCEDURE — 250N000009 HC RX 250: Performed by: HOSPITALIST

## 2021-12-14 PROCEDURE — 84100 ASSAY OF PHOSPHORUS: CPT | Performed by: HOSPITALIST

## 2021-12-14 PROCEDURE — 82248 BILIRUBIN DIRECT: CPT | Performed by: HOSPITALIST

## 2021-12-14 PROCEDURE — 120N000002 HC R&B MED SURG/OB UMMC

## 2021-12-14 PROCEDURE — 250N000011 HC RX IP 250 OP 636: Performed by: INTERNAL MEDICINE

## 2021-12-14 PROCEDURE — 250N000012 HC RX MED GY IP 250 OP 636 PS 637: Performed by: NURSE PRACTITIONER

## 2021-12-14 RX ORDER — NALOXONE HYDROCHLORIDE 0.4 MG/ML
0.4 INJECTION, SOLUTION INTRAMUSCULAR; INTRAVENOUS; SUBCUTANEOUS
Status: DISCONTINUED | OUTPATIENT
Start: 2021-12-14 | End: 2021-12-18 | Stop reason: HOSPADM

## 2021-12-14 RX ORDER — DEXTROSE MONOHYDRATE 25 G/50ML
25-50 INJECTION, SOLUTION INTRAVENOUS
Status: DISCONTINUED | OUTPATIENT
Start: 2021-12-14 | End: 2021-12-14

## 2021-12-14 RX ORDER — DEXTROSE MONOHYDRATE 100 MG/ML
INJECTION, SOLUTION INTRAVENOUS CONTINUOUS PRN
Status: DISCONTINUED | OUTPATIENT
Start: 2021-12-14 | End: 2021-12-18 | Stop reason: HOSPADM

## 2021-12-14 RX ORDER — NICOTINE POLACRILEX 4 MG
15-30 LOZENGE BUCCAL
Status: DISCONTINUED | OUTPATIENT
Start: 2021-12-14 | End: 2021-12-14

## 2021-12-14 RX ORDER — NALOXONE HYDROCHLORIDE 0.4 MG/ML
0.2 INJECTION, SOLUTION INTRAMUSCULAR; INTRAVENOUS; SUBCUTANEOUS
Status: DISCONTINUED | OUTPATIENT
Start: 2021-12-14 | End: 2021-12-18 | Stop reason: HOSPADM

## 2021-12-14 RX ADMIN — INSULIN GLARGINE 6 UNITS: 100 INJECTION, SOLUTION SUBCUTANEOUS at 11:14

## 2021-12-14 RX ADMIN — ENOXAPARIN SODIUM 40 MG: 40 INJECTION SUBCUTANEOUS at 08:23

## 2021-12-14 RX ADMIN — DEXTROSE AND SODIUM CHLORIDE: 5; 450 INJECTION, SOLUTION INTRAVENOUS at 03:46

## 2021-12-14 RX ADMIN — I.V. FAT EMULSION 250 ML: 20 EMULSION INTRAVENOUS at 20:04

## 2021-12-14 RX ADMIN — Medication: at 20:04

## 2021-12-14 ASSESSMENT — ACTIVITIES OF DAILY LIVING (ADL)
ADLS_ACUITY_SCORE: 8
ADLS_ACUITY_SCORE: 6
ADLS_ACUITY_SCORE: 8
ADLS_ACUITY_SCORE: 6
ADLS_ACUITY_SCORE: 8
ADLS_ACUITY_SCORE: 8
ADLS_ACUITY_SCORE: 6
ADLS_ACUITY_SCORE: 8
ADLS_ACUITY_SCORE: 8
ADLS_ACUITY_SCORE: 6
ADLS_ACUITY_SCORE: 6
ADLS_ACUITY_SCORE: 8
ADLS_ACUITY_SCORE: 6
ADLS_ACUITY_SCORE: 8
ADLS_ACUITY_SCORE: 6
ADLS_ACUITY_SCORE: 8
ADLS_ACUITY_SCORE: 6

## 2021-12-14 NOTE — PROGRESS NOTES
Documentation of Face to Face and Certification for Home Health Services     I certify that patient: Shira Joy is under my care and that I, or a nurse practitioner or physician's assistant working with me, had a face-to-face encounter that meets the physician face-to-face encounter requirements with this patient on:  December 14, 2021.     This encounter with the patient was in whole, or in part, for the following medical condition, which is the primary reason for home health care: Bowel Obstruction.     I certify that, based on my findings, the following services are medically necessary home health services: Skilled RN for education reinforcement with home TPN and NG Tube for decompression.     My clinical findings support the need for the above services because: MD orders and recommendations by the interdisciplinary team.     Further, I certify that my clinical findings support that this patient is homebound secondary to illness.     Based on the above findings. I certify that this patient is confined to the home and needs intermittent skilled nursing care, physical therapy and/or speech therapy.  The patient is under my care, and I have initiated the establishment of the plan of care.  This patient will be followed by a physician who will periodically review the plan of care.   Physician/Provider to provide follow up care: Kae Parmar     Attending hospital physician (the Medicare certified [facility] provider): Dr. Tony Howard M.D.   Physician Signature: See electronic signature associated with these discharge orders.     Date: 12/14/2021

## 2021-12-14 NOTE — PROGRESS NOTES
Pt has coverage for TPN and enteral through their BCBS plan as long as via tube. Pt has met their deductible and OOP so she should be covered at 100%.            ([facility]) In reference to admission date 12/12/2021 to check for tpn, enteral coverage.               Please contact Intake with any questions, [phone] or In Basket pool, [facility] ([phone]).

## 2021-12-14 NOTE — PLAN OF CARE
Vitally stable on room air. Emotional due to news from doctors, support given. Up ad earl. Clear liquid diet, tolerating some. Denies nausea. NG tube clamped while eating, otherwise to low continuous suction. Port dressing changed, infusing IV maintenance fluids. BG high but endocrine did not want to start insulin pump. BG q4hr, sliding scale insulin ordered today and lantus given.  at bedside and very supportive with cares. Pt resting between cares. Consult in for patient learning center appointment. Continue plan of care.

## 2021-12-14 NOTE — DISCHARGE INSTRUCTIONS
Home DME Equipment for [facility] Home Suction Pump:  [facility]    Ph:  [phone]    Fax:  [phone]    [facility] Suction Machine    Suction Canisters (2)  Suction Connector tube (2)  5 in 1 Connector (2)  Bacteria Filter (2)    Low Blood Sugar-What you may notice  If you have low blood sugar(BG <70mg/dL), you may have one or more of these symptoms:    Shakiness or dizziness    Cold, clammy skin or sweating    Feeling hungry    Headache    Nervousness    A hard, fast heartbeat    Weakness    Confusion or irritability    Blurred eyesight    Having nightmares or waking up confused or sweating    Numbness or tingling in the lips or tongue  What you should do  Here are tips to follow if you have hypoglycemia:     First check your blood sugar. If it's too low (out of your target range <70), eat or drink 15 to 20 grams of fast-acting sugar. Use glucose gel, have it sit under your tongue for a bit before swallowing as able.     Wait 15 minutes. Then recheck your blood sugar if you can.    If your blood sugar does not improve you will need to use your Baqsimi. Then be sure to contact your provider right away as you will likely need adjustments to insulin regimen.

## 2021-12-14 NOTE — CONSULTS
"    Interventional Radiology Consult Service Note    Patient is 41 year old female with history of metastatic gastric adenocarcinoma with course complicated by autoimmune diabetes and recurrent SBO. IR request for venting gastrostomy tube placement.    Case discussed with Dr. Robby Melgar.    IR defers gastrostomy tube placement for a few reasons. CT imaging from 12/12/21 shows a poor percutaneous path for access to the stomach due to overlying liver. Due to prior abdominal surgeries, it is less likely for the stomach to move to a space for safe percutaneous access when insulflated. The patient also has ascites which increases the risk of leakage and infection.    Dr. Howard made aware of IR recommendation.    Vitals:   /76 (BP Location: Right arm)   Pulse 64   Temp 98.5  F (36.9  C) (Temporal)   Resp 16   Ht 1.6 m (5' 3\")   Wt 41.3 kg (91 lb)   SpO2 99%   BMI 16.12 kg/m      Estrella Arvizu PA-C  Interventional Radiology  Pager: [phone]    "

## 2021-12-14 NOTE — PLAN OF CARE
"/70   Pulse 63   Temp 97.6  F (36.4  C) (Oral)   Resp 16   Ht 1.6 m (5' 3\")   Wt 41.3 kg (91 lb)   SpO2 98%   BMI 16.12 kg/m      Activity: Up ad earl independently.  Ambulated in hallway.    Diet:  NPO    Pain: Denies pain    Respiratory:  No oxygen needs.  No complaints of shortness of breath.    Cardiovascular: No complaints of chest pain.    GI: No BM this shift.  No nausea or vomiting. NG tube to LCS.      : Patient voids spontaneously and independently.    Skin: Monitoring NG tube site for skin breakdown.    Neurologic: AO x 4.    Lines: Ailyn cath infusing.    Plan: Continue POC.    "

## 2021-12-14 NOTE — PROVIDER NOTIFICATION
Notified MD at 0830 AM regarding Pt  then 215. Would you like insulin drip started? thanks.      Did not hear back from provider    Comments: Continued BG checks q1hr. BG down trending. Will continue to monitor.

## 2021-12-14 NOTE — PROGRESS NOTES
[facility]    Medicine Progress Note - Hospitalist Service, Gold 8       Date of Admission:  12/12/2021    Assessment & Plan          Shira Joy is a 41 year old female admitted on 12/12/2021. She has PMHx of metastatic gastric adenocarcinoma with liver and ovarian metastases and peritoneal carcinomatosis (diagnosed 7/2021, on treatment with FOLFOX plus trastuzumab and keydtruda since 8/25/21) and recent recurrent SBO (most recently admitted 12/6-12/9) presented with abdominal pain and vomiting found to have another episode of SBO.     Recurrent Small Bowel Obstruction  Gastric Adenocarcinoma with metastases to liver and ovaries, peritoneal carcinomatosis  She was doing well for the past 3 days since her 12/9 discharge until around 1pm on the day of admission. She has her primary oncologist appointment scheduled on 12/13. CT abdomen showed changes of mechanical small bowel obstruction with transition of small bowel seen in the right lower abdomen. Likely due to underlying malignant peritoneal disease. Hemodynamically stable. Surgery was consulted and recommended conservative management with NG tube decompression, IVF and pain management as well as consideration of palliative G tube placement. The patient would like to know how she could manage this at home so she does not need to be repeatedly admitted for SBO.  - NG tube placed, LIS  - NPO on IVF D5%  ml/hr until TPN (starting 12/14 8PM)   *started CLD 12/14 given had BM overnight and per initial surgey recs can continue CLD while antegrade bowel function  - pain control w dilaudid 0.5mg IV q3h prn  - change PPI to IV  - oncology following, ideally could get venting Gtube:   *surgery consulted: not surgical candidate   *GI consulted 12/14 but they prefer disscuss w/ IR first   *IR consulted 12/14: not IR candidate   *Re-consulted GI, pending     DM1  Thought to be autoimmune DM1 given concurrent pembrolizumab  treatment and low C-Peptide , slender body habitus. A/W autoimmune related hypothalamic, adrenal, thyroid, diabetes.  Normal adrenal, thyroid labs on last admission at [facility]. During her recent admission, endocrine was consulted with recommendation to discharge patient on 12 units of Lantus daily, 1 unit of short acting insulin for every 15 g of carbohydrates with meals and snacks, and continuation of her home sliding scale.    - appreciate endo recs:  Plan:                  -Lantus 6 units 12/14 AM                 -hold CHO coverage as NPO                 -Novolog  Low intensity sliding scale Q 4 hours                 -no insulin in TPN to start on 12/14 as she received Lantus AM 12/14, will order one time dose NPH for 12/15 AM, then add insulin to TPN 12/15                 -BG monitoring Q 4 hours                 -hypoglycemia protocol                 -diabetes education needs will be assessed closer to discharge.     Malnutrition:    - Level of malnutrition: Severe   - Based on: moderate/severe subcutaneous fat loss, moderate/severe muscle loss       Diet: Clear Liquid Diet  parenteral nutrition - ADULT compounded formula    DVT Prophylaxis: Enoxaparin (Lovenox) SQ  Magana Catheter: Not present  Central Lines: None  Code Status: Full Code      Disposition Plan   Expected Discharge: 12/16/2021     Anticipated discharge location:  Awaiting care coordination huddle  Delays:            The patient's care was discussed with the Bedside Nurse, Patient and oncology Consultant.and IR consultant and GI consultant.    Tony Howard MD  Hospitalist Service, [address]  Securely message with the Vocera Web Console (learn more here)  Text page via [employer] Paging/Directory    Please see sign in/sign out for up to date coverage information    Clinically Significant Risk Factors Present on Admission           # Severe Malnutrition, POA: based on Weight loss;Reduced  intake;Subcutaneous fat loss;Muscle loss (12/14/21 1100)  ______________________________________________________________________    Interval History   No nausea now w/ NG, had bowel movement lst night, pain well controlled though back of throat sore. Really hopes daughter can visit tomorrow for her birthday 12yo, tearful about this.    4 point ROS otherwise negative.     Data reviewed today: I reviewed all medications, new labs and imaging results over the last 24 hours. I personally reviewed no images or EKG's today.    Physical Exam   Vital Signs: Temp: 98.5  F (36.9  C) Temp src: Temporal BP: 124/76 Pulse: 64   Resp: 16 SpO2: 99 % O2 Device: None (Room air)    Weight: 91 lbs 0 oz    Physical Exam   Constitutional: thin NAD  HEENT: EOMI  Neck: Symmetric  Cardiovascular: regular without murmurs or gallops  Pulmonary/Chest: CTAB. No respiratory distress.   GI: Soft, non-tender , non-distended    Musculoskeletal:  No lower extremity edema   Skin: Skin is warm and dry. Port accessed  Neurological: Alert and oriented   Psychiatric:  hypothymic      Data   Recent Labs   Lab 12/14/21  1424 12/14/21  1034 12/14/21  0933 12/14/21  0721 12/14/21  0707 12/13/21  0814 12/13/21  0702 12/12/21  2028 12/09/21  0807 12/09/21  0723 12/08/21  0805 12/08/21  0743   WBC  --   --   --   --  3.3*  --   --  7.3  --   --   --  4.9   HGB  --   --   --   --  9.3*  --   --  10.2*  --  9.3*  --  9.2*   MCV  --   --   --   --  94  --   --  88  --   --   --  92   PLT  --   --   --   --  227  --   --  250  --   --   --  239   NA  --   --   --   --  137  --   --  137  --   --   --  138   POTASSIUM  --   --   --   --  3.8  --  3.8 3.8  --   --   --  3.7   CHLORIDE  --   --   --   --  103  --   --  102  --   --   --  104   CO2  --   --   --   --  24  --   --  25  --   --   --  27   BUN  --   --   --   --  10  --   --  11  --   --   --  6*   CR  --   --   --   --  0.58  --   --  0.52  --   --   --  0.56   ANIONGAP  --   --   --   --  10  --   --  10   --   --   --  7   JORGE L  --   --   --   --  8.6  --   --  9.2  --   --   --  8.6   * 230* 213*   < > 192*   < >  --  146*   < >  --    < > 132*   ALBUMIN  --   --   --   --   --   --   --  3.3*  --   --   --   --    PROTTOTAL  --   --   --   --   --   --   --  7.5  --   --   --   --    BILITOTAL  --   --   --   --   --   --   --  0.6  --   --   --   --    ALKPHOS  --   --   --   --   --   --   --  76  --   --   --   --    ALT  --   --   --   --   --   --   --  32  --   --   --   --    AST  --   --   --   --   --   --   --  23  --   --   --   --     < > = values in this interval not displayed.

## 2021-12-14 NOTE — PROGRESS NOTES
Hematology / Oncology  Daily Progress Note   Date of Service: 12/14/2021  Patient: Shira Joy  MRN: [id number]  Admission Date: 12/12/2021  Hospital Day # 1  Cancer Diagnosis: Metastatic gastric adenocarcinoma  Primary Outpatient Oncologist: Dr. Peterson ([place]), Dr. cShmidt ([facility])  Current Treatment Plan: FOLFOX + trastuzumab + pembrolizumab    Recommendations:   - Please consult GI for consideration of venting PEG placement, appreciated  - Would need to plan on NG with home suctioning if unable to place PEG  - Nutrition following -- we should plan on starting TPN this admission  - TPN can be administered through her port for now, but she may need a PICC eventually  - She will need teaching on PEG or NG management, along with TPN teaching and home services  - Appreciate endocrinology involvement -- will need modulation to her insulin plan once started on TPN  - Oncology will follow     Assessment & Plan:   Ms. Shira Joy is a 40yo with metastatic gastric adenocarcinoma with metastases to the liver, ovaries, and peritoneum treated with FOLFOX + trastuzumab + pembrolizumab and c/b autoimmune diabetes and recent SBO who presented recurrent abdominal pain and new emesis found to have recurrent SBO.     Diagnoses:  # Metastatic gastric adenocarcinoma  # Malignant SBO  # Autoimmune DM    Patient's symptoms are very much c/w partial SBO as she continues to pass flatus and had a BM today. However, would anticipate this will continue to be an ongoing problem and will limit her ability to tolerate sufficient PO nutrition. Additionally, she will likely have waxing and waning symptoms that warrant an outpatient solution which would include, ideally venting G-tube. As with so many oncology patients with peritoneal disease, this could be challenging, but we should explore every option for her in order to attempt to preserve her quality of life. Regardless of the ultimate outcome (NG vs PEG), we should plan on  her requiring TPN. Given this, she will likely need aid from endocrinology to formulate an appropriate insulin plan.  ___________________________________________________________________    Oncologic History:  Oncologists  [place]: Dr. Peterson  [facility]: Dr. Schmidt     Diagnosis: Metastatic gastric adenocarcinoma     Treatment history (copied & updated from last [place] document in CareEverywhere on 10/14/21)     7/24/2021 Initial Diagnosis   She presented with few weeks of abdominal pain, which she attributed to her known fibroids. On 07/24/2021, she developed acute onset of severe abdominal pain prompting emergency department evaluation. CT of the abdomen revealed free air concerning for viscus perforation. She underwent exploratory laparotomy which revealed a perforated gastric ulcer. This was repaired with a Frank patch. Additionally, she was noted to have omental and peritoneal nodules which were biopsied. Intraoperative EGD revealed a large gastric mass which was also biopsied.    7/24/2021 Biopsy/Pathology   Gastric biopsy: Poorly differentiated adenocarcinoma with focal Signet ring formation. Immunohistochemical stain for Helicobacter pylori is negative.     Additional biopsies from the mesentery, gastrocolic ligament, and a left pelvic sidewall nodule were all consistent with metastatic poorly differentiated adenocarcinoma.    HER2 FISH was positive for HER2 amplification (HER2/D17Z! Ratio: 2.75)    PD-L1 and MMR testing were pending at the time of initial consultation.     9/3/2021 Biopsy/Pathology   PDL1, mismatch repair, and HER2 IHC on the initial 7/24 biopsy were delayed due to inadvertent cancellation of the tests initially.     Results were ultimately reported 9/2021:     PD-L1 CPS 10 (PD-L1 clone 22C3: tumor cells 0%, TILs 10%)    Mismatch repair intact (IHC)    HER2 1+ (IHC)    9/13/2021 Genetic Testing and Tumor Genotyping   Strong family history esophageal, gastric, colon, and other cancer. Clinical  "genetics consult obtained, and Common Hereditary Cancers Panel ([facility]) sent      Started on FOLFOX + trastuzumab + pembrolizumab in 08/2021. Plan at 10/14/21 follow-up was to continue this for an additional two months given difficulty in interpreting her restaging scans (given lack of ideal timing for her initial scans).      Admission at [facility] 12/6-12/9/21 for malignant SBO. Resolved with conservative management.      Plan of care was discussed with attending physician Dr. Barber. Patient was not seen by Dr. Barber today.    Thank you for the opportunity to partake in this patients plan of care. Please do not hesitate to page with questions. We will continue to follow.     Jake Charlton MD PhD  Hematology/Oncology Fellow  Pgr: [phone]    ___________________________________________________________________    Subjective & Interval History:    No acute events noted overnight. Had a BM yesterday and continues to pass flatus. Denies any abdominal pain, with rapid resolution of her LLQ pain that she presented with. No additional complaints. We discussed further what the coming weeks would look like.     Complete and Comprehensive review of systems review and negative other than noted here or in the HPI.     Physical Exam:    Blood pressure (!) 144/97, pulse 66, temperature 97.5  F (36.4  C), temperature source Oral, resp. rate 17, height 1.6 m (5' 3\"), weight 41.3 kg (91 lb), SpO2 97 %.    Constitutional: Awake and conversational. Thin. In no acute distress   HEENT: Normocephalic, atraumatic. NGT in place.  Lymph: Neck supple.   Respiratory: Breathing comfortable with no increased work on room air. No signs of respiratory distress or accessory muscle use.  Cardiovascular: Regular rate and rhythm. Normal S1 and S2. No murmurs, rubs, or gallops. No peripheral edema.    GI: Abdomen is soft, non-distended, non-tender.   Skin: Skin is clean, dry, intact. No jaundice appreciated.   Musculoskeletal/ " Extremities: No redness, warmth, or swelling of the joints appreciated. Nailbeds pink and without cyanosis or clubbing.   Neurologic: Alert and oriented. Speech normal. Grossly nonfocal. Memory and thought process preserved. Motor function grossly normal  Neuropsychiatric: Calm, affect congruent to situation. Intermittently tearful     Labs & Studies: I personally reviewed the following studies:  ROUTINE LABS (Last four results):  CMP  Recent Labs   Lab 12/14/21  1034 12/14/21  0933 12/14/21  0827 12/14/21  0721 12/14/21  0707 12/13/21  0814 12/13/21  0702 12/12/21 2028 12/08/21  0805 12/08/21  0743   NA  --   --   --   --  137  --   --  137  --  138   POTASSIUM  --   --   --   --  3.8  --  3.8 3.8  --  3.7   CHLORIDE  --   --   --   --  103  --   --  102  --  104   CO2  --   --   --   --  24  --   --  25  --  27   ANIONGAP  --   --   --   --  10  --   --  10  --  7   * 213* 215* 191* 192*   < >  --  146*   < > 132*   BUN  --   --   --   --  10  --   --  11  --  6*   CR  --   --   --   --  0.58  --   --  0.52  --  0.56   GFRESTIMATED  --   --   --   --  >90  --   --  >90  --  >90   JORGE L  --   --   --   --  8.6  --   --  9.2  --  8.6   MAG  --   --   --   --  2.1  --   --   --   --   --    PROTTOTAL  --   --   --   --   --   --   --  7.5  --   --    ALBUMIN  --   --   --   --   --   --   --  3.3*  --   --    BILITOTAL  --   --   --   --   --   --   --  0.6  --   --    ALKPHOS  --   --   --   --   --   --   --  76  --   --    AST  --   --   --   --   --   --   --  23  --   --    ALT  --   --   --   --   --   --   --  32  --   --     < > = values in this interval not displayed.     CBC  Recent Labs   Lab 12/14/21  0707 12/12/21 2028 12/09/21 0723 12/08/21  0743   WBC 3.3* 7.3  --  4.9   RBC 3.18* 3.50*  --  3.13*   HGB 9.3* 10.2* 9.3* 9.2*   HCT 29.8* 30.9*  --  28.7*   MCV 94 88  --  92   MCH 29.2 29.1  --  29.4   MCHC 31.2* 33.0  --  32.1   RDW 14.8 15.1*  --  14.7    250  --  239     INRNo lab  results found in last 7 days.    Medications list for reference:  Current Facility-Administered Medications   Medication     benzocaine 20% (HURRICAINE/TOPEX) 20 % spray 0.5-1 mL     dextrose 5% and 0.45% NaCl infusion     glucose gel 15-30 g    Or     dextrose 50 % injection 25-50 mL    Or     glucagon injection 1 mg     enoxaparin ANTICOAGULANT (LOVENOX) injection 40 mg     HYDROmorphone (DILAUDID) injection 0.2-0.5 mg     insulin 1 unit/mL in saline (NovoLIN, HumuLIN Regular) drip - ADULT IV Infusion     insulin glargine (LANTUS PEN) injection 6 Units     lidocaine (LMX4) cream     lidocaine (XYLOCAINE) 4 % solution 5 mL     lidocaine 1 % 0.1-1 mL     LORazepam (ATIVAN) injection 0.5 mg     melatonin tablet 1 mg     ondansetron (ZOFRAN-ODT) ODT tab 4 mg    Or     ondansetron (ZOFRAN) injection 4 mg     oxymetazoline (AFRIN) 0.05 % spray 2 spray     prochlorperazine (COMPAZINE) injection 10 mg    Or     prochlorperazine (COMPAZINE) tablet 10 mg    Or     prochlorperazine (COMPAZINE) suppository 25 mg     sodium chloride (PF) 0.9% PF flush 3 mL     sodium chloride (PF) 0.9% PF flush 3 mL

## 2021-12-14 NOTE — PLAN OF CARE
Neuro: A&Ox4  Respiratory: RA  Cardiac: -  GI: pt states she is passing gas. No BM. NG tube to LCS. Brown output. NPO.  : Voiding spontaneously   Pain: Tolerating pain with no additional pain medications   IV access: Port infusing d5% .45 NaCL  Mobility: up ad earl   Q4 glucose checks   Continue plan of care

## 2021-12-14 NOTE — PROGRESS NOTES
IP Diabetes Management  Daily Note           Assessment and Plan:   HPI: Shira Joy is a 41 year old female admitted on 12/12/2021. She has PMHx of metastatic gastric adenocarcinoma with liver and ovarian metastases and peritoneal carcinomatosis (diagnosed 7/2021, on treatment with FOLFOX plus trastuzumab and keydtruda since 8/25/21) and recent recurrent SBO (most recently admitted 12/6-12/9) presented with abdominal pain and vomiting found to have another episode of SBO.    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    2) SBO, NG placed  3) gastric adenocarcinoa with mets to liver, ovaries, peritoneal carcinamatosis      Plan:    -Lantus 6 units this AM   -hold CHO coverage as NPO   -Novolog  Low intensity sliding scale Q 4 hours   -no insulin in TPN to start tonight as she received Lantus this AM, will order one time dose NPH for tomorrow AM, then add insulin to TPN tomorrow   -BG monitoring Q 4 hours   -hypoglycemia protocol   -diabetes education needs will be assessed closer to discharge.     Outpatient follow up: TBD  Plan discussed with patient, bedside RN, RD, and primary team through this note.       Interval History and Assessment: interval glucose trend reviewed:         BG rising this AM, D5 1/2NS decreased to 50 ml/hour from 75 ml/hour this AM.  Will give Lantus 6 units this AM, could further decrease D5 IVF if hyperglycemia persisting.    Per RD, plan is to start TPN tonight, 90g total (3.75g/hour dextrose)-IVF were D5 at 50 ml/hour for 2.5g dextrose per hour, continuous TPN.  Will hold off on adding insulin to TPN as she was given Lantus 6 units this AM.  Anticipate a one time dose of NPH tomorrow AM, then adding insulin to TPN tomorrow.  Glucose in TPN will likely increase tomorrow as well.  Will add in low intensity sliding scale insulin Q 4 hours.     Current nutritional intake and type: Orders  "Placed This Encounter      NPO for Medical/Clinical Reasons Except for: Meds      Planned Procedures/surgeries: none known at this time  Steroid planning: none  D5W-containing solutions/medications: D5 at 50 ml/hour, TPN to start tonight    PTA Diabetes Regimen: none    Discharge Planning: TBD           Diabetes History:   Type of Diabetes: autoimmune DM  Lab Results   Component Value Date    A1C 7.1 12/07/2021              Review of Systems:     The Review of Systems is negative other than noted in the Interval History.           Medications:     Current Facility-Administered Medications   Medication     dextrose 5% and 0.45% NaCl infusion     glucose gel 15-30 g    Or     dextrose 50 % injection 25-50 mL    Or     glucagon injection 1 mg     enoxaparin ANTICOAGULANT (LOVENOX) injection 40 mg     HYDROmorphone (DILAUDID) injection 0.2-0.5 mg     insulin 1 unit/mL in saline (NovoLIN, HumuLIN Regular) drip - ADULT IV Infusion     insulin glargine (LANTUS PEN) injection 6 Units     lidocaine (LMX4) cream     lidocaine (XYLOCAINE) 4 % solution 5 mL     lidocaine 1 % 0.1-1 mL     LORazepam (ATIVAN) injection 0.5 mg     melatonin tablet 1 mg     ondansetron (ZOFRAN-ODT) ODT tab 4 mg    Or     ondansetron (ZOFRAN) injection 4 mg     oxymetazoline (AFRIN) 0.05 % spray 2 spray     prochlorperazine (COMPAZINE) injection 10 mg    Or     prochlorperazine (COMPAZINE) tablet 10 mg    Or     prochlorperazine (COMPAZINE) suppository 25 mg     sodium chloride (PF) 0.9% PF flush 3 mL     sodium chloride (PF) 0.9% PF flush 3 mL            Physical Exam:    BP (!) 144/97 (BP Location: Right arm)   Pulse 66   Temp 97.5  F (36.4  C) (Oral)   Resp 17   Ht 1.6 m (5' 3\")   Wt 41.3 kg (91 lb)   SpO2 97%   BMI 16.12 kg/m    General: pleasant, in no distress.   HEENT: normocephalic, atraumatic. Oral mucous membranes moist. NGT in place.  Lungs: unlabored respiration, no cough  ABD: rounded, nondistended  Skin: warm and dry, no obvious " lesions  MSK:  moves all extremities  Lymp:  no LE edema   Mental status:  alert, oriented to self, place, time  Psych:  affect, calm and appropriate interaction             Data:     Recent Labs   Lab 12/14/21  0827 12/14/21  0721 12/14/21  0707 12/14/21  0008 12/13/21  1950 12/13/21  1541   * 191* 192* 93 82 81     Lab Results   Component Value Date    WBC 3.3 (L) 12/14/2021    WBC 7.3 12/12/2021    WBC 4.9 12/08/2021    HGB 9.3 (L) 12/14/2021    HGB 10.2 (L) 12/12/2021    HGB 9.3 (L) 12/09/2021    HCT 29.8 (L) 12/14/2021    HCT 30.9 (L) 12/12/2021    HCT 28.7 (L) 12/08/2021    MCV 94 12/14/2021    MCV 88 12/12/2021    MCV 92 12/08/2021     12/14/2021     12/12/2021     12/08/2021     Lab Results   Component Value Date     12/14/2021     12/12/2021     12/08/2021    POTASSIUM 3.8 12/14/2021    POTASSIUM 3.8 12/13/2021    POTASSIUM 3.8 12/12/2021    CHLORIDE 103 12/14/2021    CHLORIDE 102 12/12/2021    CHLORIDE 104 12/08/2021    CO2 24 12/14/2021    CO2 25 12/12/2021    CO2 27 12/08/2021     (H) 12/14/2021     (H) 12/14/2021     (H) 12/14/2021     Lab Results   Component Value Date    BUN 10 12/14/2021    BUN 11 12/12/2021    BUN 6 (L) 12/08/2021     No results found for: TSH  Lab Results   Component Value Date    AST 23 12/12/2021    AST 28 12/06/2021    ALT 32 12/12/2021    ALT 65 (H) 12/06/2021    ALKPHOS 76 12/12/2021    ALKPHOS 84 12/06/2021           35 minutes spent on the date of the encounter doing chart review, history and exam, documentation and further activities per the note      Over 50% of my time on the unit was spent counseling the patient and/or coordinating care regarding acute hyperglycemia management.  See note for details.    To contact Endocrine Diabetes service:   From 8AM-4PM: page inpatient diabetes provider that is following the patient  For questions or updates from 4PM-8AM: page the diabetes job code for on call fellow:  [id number]    DEBBI Cleaning, CNP  Inpatient Diabetes Management Service  Pager [phone]

## 2021-12-14 NOTE — PROGRESS NOTES
CLINICAL NUTRITION SERVICES - ASSESSMENT NOTE     Nutrition Prescription    RECOMMENDATIONS FOR MDs/PROVIDERS TO ORDER:  Stop dextrose containing fluids when TPN starts     Malnutrition Status:    Severe malnutrition in the context of acute on chronic illness     Recommendations already ordered by Registered Dietitian (RD):  Ordered Triglycerides and Direct Bili   Dosing weight 41.3 kg   Access: Port   Initiate with 1080 mL (45 mL/hr) 90 g dex (GIR= 1.5 mg/kg/min), 75 g AA (1.8 g/kg) and 250 mL 20% intralipid 5x per week = 963 kcal (23 kcal/kg) and 37% kcal from fat  -Once patient receives 100% of initial PN volume with K>/=3, Mg>/=1.5, and Po4>/=1.9 advance dextrose by 50-70 g every 1-2 days to goal of 230g (GIR= 3.86 mg/kg/min)  --TPN at goal concentration provides: 230g dex (782kcal), 75g AA (1.8 g/kg) and 250 mL 20% intralipid 5x per week = 1439 kcal (~35 kcal/kg) and 25% kcal from fat   --Electrolytes/Additives per pharmD, recommend infuvite daily and MTE-4  --Monitor bili, LFTs, TG at the start and weekly thereafter while on TPN  --Endocrinology following - paged regarding TPN     Future/Additional Recommendations:  Monitor ability to maintain/gain weight with TPN- patient may need increased macronutrient provisions  --would recommend overall increase to 250 g dex (GIR = 4.2 mg/kg/min) 80 g AA (1.9 g/kg) and 250 ML 20% intralipid 6x per week = 1598 kcal (38 kcal/kg) and 26% kcal from fat      REASON FOR ASSESSMENT  Shira Joy is a/an 41 year old female assessed by the dietitian for Provider Order - underweight, cancer, may need TPN depending on upcoming G tube decision with surgery  -Also consult for Pharmacy/Nutrition to Start and Manage TPN     CLINICAL HISTORY   PMHx of metastatic gastric adenocarcinoma with liver and ovarian metastases and peritoneal carcinomatosis (diagnosed 7/2021, on treatment with FOLFOX plus trastuzumab and keydtruda since 8/25/21) and recent recurrent SBO (most recently admitted  "12/6-12/9) presented with abdominal pain and vomiting found to have another episode of SBO.    NUTRITION HISTORY  Recently seen by RD on 12/8/21- see note for details.     - she reported since last admission she was eating soft foods at home Thursday-Sunday, then abdominal pain/symptoms started Sunday. NG placed today to suction and she was going to try clears with clamp trial.   She currently has Port- per oncology note, can be used for TPN     CURRENT NUTRITION ORDERS  Diet: Clear Liquid  Intake/Tolerance: N/A     LABS  Glucose 192 (H)- Endocrinology following   Na 137, K+ 3.8, Mg 2.1, Po4 3.3 (WNL)  ALT 32, AST 23, Alkaline Phos 76 (WNL)    MEDICATIONS  Lantus (6 units)   D5-1/2 NS @ 75 mL/hr     ANTHROPOMETRICS  Height: 160 cm (5' 3\")  Most Recent Weight: 41.3 kg (91 lb)    IBW: 52.3 kg  BMI: Underweight BMI <18.5  Weight History:   Patient reports usual body weight around 112 lbs   Lowest weight 89 lbs  Wt Readings from Last 15 Encounters:   12/13/21 41.3 kg (91 lb)   12/06/21 42.2 kg (93 lb)   08/13/21 46.6 kg (102 lb 12.8 oz)   11% weight loss in 3 months     Dosing Weight: 41.3 kg    ASSESSED NUTRITION NEEDS  Estimated Energy Needs: 1239- 1445- 1652 kcals/day (30 - 35 - 40 kcals/kg )  Justification: Increased needs/Repletion/Underweight   Estimated Protein Needs: 62-82+ grams protein/day (1.5 - 2 grams of pro/kg)  Justification: Increased needs  Estimated Fluid Needs: 5396-8758 mL/day (25-30 mL/kcal)   Justification: Maintenance    PHYSICAL FINDINGS  See malnutrition section below.    MALNUTRITION  % Intake: < 75% for >/= 3 months (moderate)  % Weight Loss: > 7.5% in 3 months (severe)  Subcutaneous Fat Loss: Facial region:  mild, Upper arm:  mild and Lower arm:  mild  Muscle Loss: Temporal:  mild, Thoracic region (clavicle, acromium bone, deltoid, trapezius, pectoral):  Mild-moderate, Upper arm (bicep, tricep):  moderate, Lower arm  (forearm):  moderate  --previously noted moderate loss on lower extremities, " not assessed during this visit  Fluid Accumulation/Edema: None noted  Malnutrition Diagnosis: Severe malnutrition in the context of acute on chronic illness     NUTRITION DIAGNOSIS  Inadequate protein-energy intake related to altered GI function and increased metabolic demand as evidenced by weight loss of 11% in 3 months, underweight status (BMI 16.12) and recurrent SBO with NG placement to suction and plan for TPN      INTERVENTIONS  Implementation  Nutrition Education: RD role in care    Collaboration with other providers  Parenteral Nutrition/IV Fluids - Initiate      Goals  Patient to meet a minimum of 30 kcal/kg and 1.5 g/kg protein per dosing weight 41.3 kg      Monitoring/Evaluation  Progress toward goals will be monitored and evaluated per protocol.    Marbella Abbott RD, LD  5C/BMT pager: [phone]

## 2021-12-14 NOTE — CONSULTS
Care Management Initial Consult    General Information  Assessment completed with: Patient,Spouse or significant other,  (Spouse Sprncer)  Type of CM/SW Visit: CM Role Introduction    Primary Care Provider verified and updated as needed: Yes   Readmission within the last 30 days:        Reason for Consult: discharge planning  Advance Care Planning:     Cancer diagnosis and being followed in clinic setting also.  Communication Assessment  Patient's communication style: spoken language (English or Bilingual)    Hearing Difficulty or Deaf: no   Wear Glasses or Blind: yes    Cognitive  Cognitive/Neuro/Behavioral: WDL                    Living Environment:   People in home: child(cj), dependent,spouse Bandar    Current living Arrangements: house      Able to return to prior arrangements: yes     Family/Social Support:  Supportive.  Care provided by:    Provides care for:    Marital Status:              Description of Support System: Supportive       Current Resources:   Patient receiving home care services:  Not prior to admission.,     Community Resources:    Equipment currently used at home: none  Supplies currently used at home:      Employment/Financial:  Employment Status:          Financial Concerns:   None discussed.      Lifestyle & Psychosocial Needs:  (From Chart Flow Sheet)  Social Determinants of Health     Tobacco Use: Low Risk      Smoking Tobacco Use: Never Smoker     Smokeless Tobacco Use: Never Used   Alcohol Use: Not on file   Financial Resource Strain: Not on file   Food Insecurity: Not on file   Transportation Needs: Not on file   Physical Activity: Not on file   Stress: Not on file   Social Connections: Not on file   Intimate Partner Violence: Not on file   Depression: Not on file   Housing Stability: Not on file       Functional Status:  Prior to admission patient needed assistance: From Spouse and family.     Mental Health Status:  Mental Health Status: Other (see comment) (Cancer  Diagnosis.)       Chemical Dependency Status: No Concerns.        Values/Beliefs:  Spiritual, Cultural Beliefs, Taoist Practices, Values that affect care: no               Additional Information: Pending insurance authorization, the following home care plans of care have been arranged once patient is stable for discharge:    Please fax discharge orders to [facility]     Ph: [phone]     Fax: [phone]     Skilled home care RN for initial home safety evaluation and 1-3 times a week to evaluate medication management, nutrition and hydration evaluation, endurance evaluation, and general status evaluation after discharge from the acute care hospital setting.     Skilled home care RN to assist with education reinforcement with home TPN via central line.  TPN labs and central line cares per home care agency routine.     Skilled home care RN to assist with education reinforcement with home NG Tube for decompression.   ________________________________    [facility] has been arranged to assist with initiating education about home TPN and NG Tube for decompression.  Inpatient cares continue per MD team plans of care and the inpatient Care Coordinator RN will continue to follow for updated transition needs prn.    MERCED Yeboah., R.N., P.H.N..  Care Coordinator     Pager   [facility]    Addendum:  MD team to write RX for the following DME    Home DME Equipment for [facility] Suction Pump:  [facility]    Ph:  [phone]    Fax:  [phone]    [facility] Suction Machine    Suction Canisters (2)  Suction Connector tube (2)  5 in 1 Connector (2)  Bacteria Filter (2)

## 2021-12-15 ENCOUNTER — ANESTHESIA EVENT (OUTPATIENT)
Dept: SURGERY | Facility: CLINIC | Age: 41
End: 2021-12-15
Payer: COMMERCIAL

## 2021-12-15 ENCOUNTER — APPOINTMENT (OUTPATIENT)
Dept: GENERAL RADIOLOGY | Facility: CLINIC | Age: 41
End: 2021-12-15
Attending: INTERNAL MEDICINE
Payer: COMMERCIAL

## 2021-12-15 ENCOUNTER — ANESTHESIA (OUTPATIENT)
Dept: SURGERY | Facility: CLINIC | Age: 41
End: 2021-12-15
Payer: COMMERCIAL

## 2021-12-15 LAB
ALBUMIN SERPL-MCNC: 3 G/DL (ref 3.4–5)
ALP SERPL-CCNC: 72 U/L (ref 40–150)
ALT SERPL W P-5'-P-CCNC: 24 U/L (ref 0–50)
ANION GAP SERPL CALCULATED.3IONS-SCNC: 12 MMOL/L (ref 3–14)
AST SERPL W P-5'-P-CCNC: 15 U/L (ref 0–45)
BILIRUB DIRECT SERPL-MCNC: 0.2 MG/DL (ref 0–0.2)
BILIRUB SERPL-MCNC: 0.8 MG/DL (ref 0.2–1.3)
BUN SERPL-MCNC: 13 MG/DL (ref 7–30)
CALCIUM SERPL-MCNC: 8.6 MG/DL (ref 8.5–10.1)
CHLORIDE BLD-SCNC: 98 MMOL/L (ref 94–109)
CO2 SERPL-SCNC: 21 MMOL/L (ref 20–32)
CREAT SERPL-MCNC: 0.53 MG/DL (ref 0.52–1.04)
ERYTHROCYTE [DISTWIDTH] IN BLOOD BY AUTOMATED COUNT: 14.6 % (ref 10–15)
GFR SERPL CREATININE-BSD FRML MDRD: >90 ML/MIN/1.73M2
GLUCOSE BLD-MCNC: 418 MG/DL (ref 70–99)
GLUCOSE BLDC GLUCOMTR-MCNC: 220 MG/DL (ref 70–99)
GLUCOSE BLDC GLUCOMTR-MCNC: 223 MG/DL (ref 70–99)
GLUCOSE BLDC GLUCOMTR-MCNC: 244 MG/DL (ref 70–99)
GLUCOSE BLDC GLUCOMTR-MCNC: 312 MG/DL (ref 70–99)
GLUCOSE BLDC GLUCOMTR-MCNC: 313 MG/DL (ref 70–99)
GLUCOSE BLDC GLUCOMTR-MCNC: 320 MG/DL (ref 70–99)
GLUCOSE BLDC GLUCOMTR-MCNC: 378 MG/DL (ref 70–99)
HCT VFR BLD AUTO: 31.4 % (ref 35–47)
HGB BLD-MCNC: 10.1 G/DL (ref 11.7–15.7)
INR PPP: 1.08 (ref 0.85–1.15)
MAGNESIUM SERPL-MCNC: 2.2 MG/DL (ref 1.6–2.3)
MCH RBC QN AUTO: 29.7 PG (ref 26.5–33)
MCHC RBC AUTO-ENTMCNC: 32.2 G/DL (ref 31.5–36.5)
MCV RBC AUTO: 92 FL (ref 78–100)
PHOSPHATE SERPL-MCNC: 3.6 MG/DL (ref 2.5–4.5)
PLATELET # BLD AUTO: 283 10E3/UL (ref 150–450)
POTASSIUM BLD-SCNC: 4.6 MMOL/L (ref 3.4–5.3)
PREALB SERPL IA-MCNC: 10 MG/DL (ref 15–45)
PROT SERPL-MCNC: 7 G/DL (ref 6.8–8.8)
RBC # BLD AUTO: 3.4 10E6/UL (ref 3.8–5.2)
SODIUM SERPL-SCNC: 131 MMOL/L (ref 133–144)
UPPER GI ENDOSCOPY: NORMAL
WBC # BLD AUTO: 3.3 10E3/UL (ref 4–11)

## 2021-12-15 PROCEDURE — 85610 PROTHROMBIN TIME: CPT | Performed by: HOSPITALIST

## 2021-12-15 PROCEDURE — 250N000011 HC RX IP 250 OP 636: Performed by: ANESTHESIOLOGY

## 2021-12-15 PROCEDURE — 360N000082 HC SURGERY LEVEL 2 W/ FLUORO, PER MIN: Performed by: INTERNAL MEDICINE

## 2021-12-15 PROCEDURE — 370N000017 HC ANESTHESIA TECHNICAL FEE, PER MIN: Performed by: INTERNAL MEDICINE

## 2021-12-15 PROCEDURE — 250N000025 HC SEVOFLURANE, PER MIN: Performed by: INTERNAL MEDICINE

## 2021-12-15 PROCEDURE — 0D9670Z DRAINAGE OF STOMACH WITH DRAINAGE DEVICE, VIA NATURAL OR ARTIFICIAL OPENING: ICD-10-PCS | Performed by: INTERNAL MEDICINE

## 2021-12-15 PROCEDURE — 85027 COMPLETE CBC AUTOMATED: CPT | Performed by: INTERNAL MEDICINE

## 2021-12-15 PROCEDURE — 99233 SBSQ HOSP IP/OBS HIGH 50: CPT | Performed by: NURSE PRACTITIONER

## 2021-12-15 PROCEDURE — 250N000009 HC RX 250: Performed by: INTERNAL MEDICINE

## 2021-12-15 PROCEDURE — 99233 SBSQ HOSP IP/OBS HIGH 50: CPT | Performed by: HOSPITALIST

## 2021-12-15 PROCEDURE — 250N000012 HC RX MED GY IP 250 OP 636 PS 637: Performed by: NURSE PRACTITIONER

## 2021-12-15 PROCEDURE — 36591 DRAW BLOOD OFF VENOUS DEVICE: CPT | Performed by: HOSPITALIST

## 2021-12-15 PROCEDURE — 999N000141 HC STATISTIC PRE-PROCEDURE NURSING ASSESSMENT: Performed by: INTERNAL MEDICINE

## 2021-12-15 PROCEDURE — 272N000001 HC OR GENERAL SUPPLY STERILE: Performed by: INTERNAL MEDICINE

## 2021-12-15 PROCEDURE — 250N000009 HC RX 250: Performed by: HOSPITALIST

## 2021-12-15 PROCEDURE — 710N000010 HC RECOVERY PHASE 1, LEVEL 2, PER MIN: Performed by: INTERNAL MEDICINE

## 2021-12-15 PROCEDURE — 82248 BILIRUBIN DIRECT: CPT | Performed by: HOSPITALIST

## 2021-12-15 PROCEDURE — C1894 INTRO/SHEATH, NON-LASER: HCPCS | Performed by: INTERNAL MEDICINE

## 2021-12-15 PROCEDURE — 80053 COMPREHEN METABOLIC PANEL: CPT | Performed by: HOSPITALIST

## 2021-12-15 PROCEDURE — 258N000003 HC RX IP 258 OP 636: Performed by: ANESTHESIOLOGY

## 2021-12-15 PROCEDURE — 83735 ASSAY OF MAGNESIUM: CPT | Performed by: INTERNAL MEDICINE

## 2021-12-15 PROCEDURE — 250N000011 HC RX IP 250 OP 636: Performed by: INTERNAL MEDICINE

## 2021-12-15 PROCEDURE — 250N000012 HC RX MED GY IP 250 OP 636 PS 637: Performed by: INTERNAL MEDICINE

## 2021-12-15 PROCEDURE — 84134 ASSAY OF PREALBUMIN: CPT | Performed by: HOSPITALIST

## 2021-12-15 PROCEDURE — 120N000002 HC R&B MED SURG/OB UMMC

## 2021-12-15 PROCEDURE — 999N000179 XR SURGERY CARM FLUORO LESS THAN 5 MIN W STILLS: Mod: TC

## 2021-12-15 PROCEDURE — 99233 SBSQ HOSP IP/OBS HIGH 50: CPT | Performed by: INTERNAL MEDICINE

## 2021-12-15 PROCEDURE — 250N000009 HC RX 250: Performed by: ANESTHESIOLOGY

## 2021-12-15 PROCEDURE — 99221 1ST HOSP IP/OBS SF/LOW 40: CPT | Mod: 25 | Performed by: PHYSICIAN ASSISTANT

## 2021-12-15 PROCEDURE — 84100 ASSAY OF PHOSPHORUS: CPT | Performed by: HOSPITALIST

## 2021-12-15 RX ORDER — OXYCODONE HYDROCHLORIDE 5 MG/1
5 TABLET ORAL EVERY 4 HOURS PRN
Status: DISCONTINUED | OUTPATIENT
Start: 2021-12-15 | End: 2021-12-15 | Stop reason: HOSPADM

## 2021-12-15 RX ORDER — HYDROMORPHONE HCL IN WATER/PF 6 MG/30 ML
.2-.4 PATIENT CONTROLLED ANALGESIA SYRINGE INTRAVENOUS
Status: DISCONTINUED | OUTPATIENT
Start: 2021-12-15 | End: 2021-12-18 | Stop reason: HOSPADM

## 2021-12-15 RX ORDER — SODIUM CHLORIDE, SODIUM LACTATE, POTASSIUM CHLORIDE, CALCIUM CHLORIDE 600; 310; 30; 20 MG/100ML; MG/100ML; MG/100ML; MG/100ML
INJECTION, SOLUTION INTRAVENOUS CONTINUOUS
Status: DISCONTINUED | OUTPATIENT
Start: 2021-12-15 | End: 2021-12-15 | Stop reason: HOSPADM

## 2021-12-15 RX ORDER — PROPOFOL 10 MG/ML
INJECTION, EMULSION INTRAVENOUS PRN
Status: DISCONTINUED | OUTPATIENT
Start: 2021-12-15 | End: 2021-12-15

## 2021-12-15 RX ORDER — ONDANSETRON 2 MG/ML
4 INJECTION INTRAMUSCULAR; INTRAVENOUS EVERY 30 MIN PRN
Status: DISCONTINUED | OUTPATIENT
Start: 2021-12-15 | End: 2021-12-15 | Stop reason: HOSPADM

## 2021-12-15 RX ORDER — FENTANYL CITRATE 50 UG/ML
25 INJECTION, SOLUTION INTRAMUSCULAR; INTRAVENOUS EVERY 5 MIN PRN
Status: DISCONTINUED | OUTPATIENT
Start: 2021-12-15 | End: 2021-12-15 | Stop reason: HOSPADM

## 2021-12-15 RX ORDER — CEFAZOLIN SODIUM 2 G/100ML
INJECTION, SOLUTION INTRAVENOUS PRN
Status: DISCONTINUED | OUTPATIENT
Start: 2021-12-15 | End: 2021-12-15

## 2021-12-15 RX ORDER — HYDROMORPHONE HCL IN WATER/PF 6 MG/30 ML
0.2 PATIENT CONTROLLED ANALGESIA SYRINGE INTRAVENOUS EVERY 5 MIN PRN
Status: DISCONTINUED | OUTPATIENT
Start: 2021-12-15 | End: 2021-12-15 | Stop reason: HOSPADM

## 2021-12-15 RX ORDER — ESMOLOL HYDROCHLORIDE 10 MG/ML
INJECTION INTRAVENOUS PRN
Status: DISCONTINUED | OUTPATIENT
Start: 2021-12-15 | End: 2021-12-15

## 2021-12-15 RX ORDER — LIDOCAINE HYDROCHLORIDE 20 MG/ML
INJECTION, SOLUTION INFILTRATION; PERINEURAL PRN
Status: DISCONTINUED | OUTPATIENT
Start: 2021-12-15 | End: 2021-12-15

## 2021-12-15 RX ORDER — FENTANYL CITRATE 50 UG/ML
INJECTION, SOLUTION INTRAMUSCULAR; INTRAVENOUS PRN
Status: DISCONTINUED | OUTPATIENT
Start: 2021-12-15 | End: 2021-12-15

## 2021-12-15 RX ORDER — ONDANSETRON 2 MG/ML
INJECTION INTRAMUSCULAR; INTRAVENOUS PRN
Status: DISCONTINUED | OUTPATIENT
Start: 2021-12-15 | End: 2021-12-15

## 2021-12-15 RX ORDER — ONDANSETRON 4 MG/1
4 TABLET, ORALLY DISINTEGRATING ORAL EVERY 30 MIN PRN
Status: DISCONTINUED | OUTPATIENT
Start: 2021-12-15 | End: 2021-12-15 | Stop reason: HOSPADM

## 2021-12-15 RX ORDER — LIDOCAINE 40 MG/G
CREAM TOPICAL
Status: DISCONTINUED | OUTPATIENT
Start: 2021-12-15 | End: 2021-12-15 | Stop reason: HOSPADM

## 2021-12-15 RX ORDER — SODIUM CHLORIDE, SODIUM LACTATE, POTASSIUM CHLORIDE, CALCIUM CHLORIDE 600; 310; 30; 20 MG/100ML; MG/100ML; MG/100ML; MG/100ML
INJECTION, SOLUTION INTRAVENOUS CONTINUOUS PRN
Status: DISCONTINUED | OUTPATIENT
Start: 2021-12-15 | End: 2021-12-15

## 2021-12-15 RX ORDER — DEXAMETHASONE SODIUM PHOSPHATE 4 MG/ML
INJECTION, SOLUTION INTRA-ARTICULAR; INTRALESIONAL; INTRAMUSCULAR; INTRAVENOUS; SOFT TISSUE PRN
Status: DISCONTINUED | OUTPATIENT
Start: 2021-12-15 | End: 2021-12-15

## 2021-12-15 RX ADMIN — ESMOLOL HYDROCHLORIDE 20 MG: 10 INJECTION, SOLUTION INTRAVENOUS at 15:31

## 2021-12-15 RX ADMIN — INSULIN GLARGINE 6 UNITS: 100 INJECTION, SOLUTION SUBCUTANEOUS at 07:06

## 2021-12-15 RX ADMIN — ROCURONIUM BROMIDE 50 MG: 50 INJECTION, SOLUTION INTRAVENOUS at 15:08

## 2021-12-15 RX ADMIN — ONDANSETRON 4 MG: 2 INJECTION INTRAMUSCULAR; INTRAVENOUS at 15:46

## 2021-12-15 RX ADMIN — FENTANYL CITRATE 50 MCG: 50 INJECTION, SOLUTION INTRAMUSCULAR; INTRAVENOUS at 15:08

## 2021-12-15 RX ADMIN — INSULIN ASPART 4 UNITS: 100 INJECTION, SOLUTION INTRAVENOUS; SUBCUTANEOUS at 19:53

## 2021-12-15 RX ADMIN — TOPICAL ANESTHETIC 1 ML: 200 SPRAY DENTAL; PERIODONTAL at 00:28

## 2021-12-15 RX ADMIN — INSULIN ASPART 7 UNITS: 100 INJECTION, SOLUTION INTRAVENOUS; SUBCUTANEOUS at 12:12

## 2021-12-15 RX ADMIN — FENTANYL CITRATE 25 MCG: 50 INJECTION, SOLUTION INTRAMUSCULAR; INTRAVENOUS at 16:27

## 2021-12-15 RX ADMIN — MIDAZOLAM 2 MG: 1 INJECTION INTRAMUSCULAR; INTRAVENOUS at 15:02

## 2021-12-15 RX ADMIN — DEXAMETHASONE SODIUM PHOSPHATE 4 MG: 4 INJECTION, SOLUTION INTRA-ARTICULAR; INTRALESIONAL; INTRAMUSCULAR; INTRAVENOUS; SOFT TISSUE at 15:27

## 2021-12-15 RX ADMIN — INSULIN HUMAN 10 UNITS: 100 INJECTION, SUSPENSION SUBCUTANEOUS at 09:58

## 2021-12-15 RX ADMIN — ENOXAPARIN SODIUM 40 MG: 40 INJECTION SUBCUTANEOUS at 08:53

## 2021-12-15 RX ADMIN — FENTANYL CITRATE 50 MCG: 50 INJECTION, SOLUTION INTRAMUSCULAR; INTRAVENOUS at 16:04

## 2021-12-15 RX ADMIN — FENTANYL CITRATE 25 MCG: 50 INJECTION, SOLUTION INTRAMUSCULAR; INTRAVENOUS at 16:22

## 2021-12-15 RX ADMIN — PROPOFOL 100 MG: 10 INJECTION, EMULSION INTRAVENOUS at 15:08

## 2021-12-15 RX ADMIN — Medication 45 ML/HR: at 15:02

## 2021-12-15 RX ADMIN — LIDOCAINE HYDROCHLORIDE 50 MG: 20 INJECTION, SOLUTION INFILTRATION; PERINEURAL at 15:08

## 2021-12-15 RX ADMIN — FENTANYL CITRATE 50 MCG: 50 INJECTION, SOLUTION INTRAMUSCULAR; INTRAVENOUS at 15:30

## 2021-12-15 RX ADMIN — SUGAMMADEX 200 MG: 100 INJECTION, SOLUTION INTRAVENOUS at 15:49

## 2021-12-15 RX ADMIN — Medication: at 20:42

## 2021-12-15 RX ADMIN — FENTANYL CITRATE 50 MCG: 50 INJECTION, SOLUTION INTRAMUSCULAR; INTRAVENOUS at 15:23

## 2021-12-15 RX ADMIN — FENTANYL CITRATE 25 MCG: 50 INJECTION, SOLUTION INTRAMUSCULAR; INTRAVENOUS at 16:14

## 2021-12-15 RX ADMIN — FENTANYL CITRATE 25 MCG: 50 INJECTION, SOLUTION INTRAMUSCULAR; INTRAVENOUS at 16:31

## 2021-12-15 RX ADMIN — HYDROMORPHONE HYDROCHLORIDE 0.2 MG: 0.2 INJECTION, SOLUTION INTRAMUSCULAR; INTRAVENOUS; SUBCUTANEOUS at 17:17

## 2021-12-15 RX ADMIN — Medication 2 G: at 15:17

## 2021-12-15 RX ADMIN — SODIUM CHLORIDE, POTASSIUM CHLORIDE, SODIUM LACTATE AND CALCIUM CHLORIDE: 600; 310; 30; 20 INJECTION, SOLUTION INTRAVENOUS at 15:02

## 2021-12-15 RX ADMIN — I.V. FAT EMULSION 250 ML: 20 EMULSION INTRAVENOUS at 20:47

## 2021-12-15 ASSESSMENT — ACTIVITIES OF DAILY LIVING (ADL)
ADLS_ACUITY_SCORE: 8
ADLS_ACUITY_SCORE: 6
ADLS_ACUITY_SCORE: 8
ADLS_ACUITY_SCORE: 8
ADLS_ACUITY_SCORE: 6
ADLS_ACUITY_SCORE: 6
ADLS_ACUITY_SCORE: 8
ADLS_ACUITY_SCORE: 6
ADLS_ACUITY_SCORE: 8
ADLS_ACUITY_SCORE: 6
ADLS_ACUITY_SCORE: 8
ADLS_ACUITY_SCORE: 6
ADLS_ACUITY_SCORE: 8
ADLS_ACUITY_SCORE: 6
ADLS_ACUITY_SCORE: 6
ADLS_ACUITY_SCORE: 8
ADLS_ACUITY_SCORE: 6
ADLS_ACUITY_SCORE: 8
ADLS_ACUITY_SCORE: 6
ADLS_ACUITY_SCORE: 8

## 2021-12-15 ASSESSMENT — MIFFLIN-ST. JEOR: SCORE: 1043.3

## 2021-12-15 NOTE — PROVIDER NOTIFICATION
Glucoses 418/320.  Maria Esther Salas NP notified.  Insulin sliding scale increased, do not start Insulin infusion at this time, continued to monitor glucoses.

## 2021-12-15 NOTE — ANESTHESIA PREPROCEDURE EVALUATION
Anesthesia Pre-Procedure Evaluation    Patient: Shira Joy   MRN: [id number] : 1980        Preoperative Diagnosis: Bowel obstruction (H) [K56.609]    Procedure : Procedure(s):  INSERTION, PEG TUBE          History reviewed. No pertinent past medical history.   History reviewed. No pertinent surgical history.   No Known Allergies   Social History     Tobacco Use     Smoking status: Never Smoker     Smokeless tobacco: Never Used   Substance Use Topics     Alcohol use: Not on file      Wt Readings from Last 1 Encounters:   12/15/21 39.9 kg (88 lb)        Anesthesia Evaluation   Pt has had prior anesthetic. Type: General.    No history of anesthetic complications       ROS/MED HX  ENT/Pulmonary:       Neurologic:       Cardiovascular:       METS/Exercise Tolerance: >4 METS    Hematologic:       Musculoskeletal:       GI/Hepatic: Comment: Recurrent SBO (NG currently in place).  Malnutrition.      Renal/Genitourinary:       Endo: Comment: Autoimmune diabetes mellitus      Psychiatric/Substance Use:       Infectious Disease:       Malignancy:   (+) Malignancy (Metastatic gastric adenocarcinoma with liver and ovarian metastases and peritoneal carcinomatosis ), History of GI.GI CA  Active status post Chemo.        Other:            Physical Exam    Airway  airway exam normal      Mallampati: II   TM distance: > 3 FB   Neck ROM: full   Mouth opening: > 3 cm    Respiratory Devices and Support         Dental  no notable dental history         Cardiovascular   cardiovascular exam normal       Rhythm and rate: regular and normal     Pulmonary   pulmonary exam normal        breath sounds clear to auscultation           OUTSIDE LABS:  CBC:   Lab Results   Component Value Date    WBC 3.3 (L) 12/15/2021    WBC 3.3 (L) 2021    HGB 10.1 (L) 12/15/2021    HGB 9.3 (L) 2021    HCT 31.4 (L) 12/15/2021    HCT 29.8 (L) 2021     12/15/2021     2021     BMP:   Lab Results   Component Value  Date     (L) 12/15/2021     12/14/2021    POTASSIUM 4.6 12/15/2021    POTASSIUM 3.8 12/14/2021    CHLORIDE 98 12/15/2021    CHLORIDE 103 12/14/2021    CO2 21 12/15/2021    CO2 24 12/14/2021    BUN 13 12/15/2021    BUN 10 12/14/2021    CR 0.53 12/15/2021    CR 0.58 12/14/2021     (H) 12/15/2021     (H) 12/15/2021     COAGS:   Lab Results   Component Value Date    INR 1.08 12/15/2021     POC:   Lab Results   Component Value Date    HCGS Negative 12/12/2021     HEPATIC:   Lab Results   Component Value Date    ALBUMIN 3.0 (L) 12/15/2021    PROTTOTAL 7.0 12/15/2021    ALT 24 12/15/2021    AST 15 12/15/2021    ALKPHOS 72 12/15/2021    BILITOTAL 0.8 12/15/2021     OTHER:   Lab Results   Component Value Date    PH 7.44 (H) 12/06/2021    LACT 1.3 12/06/2021    A1C 7.1 (H) 12/07/2021    JORGE L 8.6 12/15/2021    PHOS 3.6 12/15/2021    MAG 2.2 12/15/2021    LIPASE 113 12/06/2021       Anesthesia Plan    ASA Status:  3      Anesthesia Type: General.     - Airway: ETT   Induction: Intravenous.   Maintenance: Balanced.        Consents    Anesthesia Plan(s) and associated risks, benefits, and realistic alternatives discussed. Questions answered and patient/representative(s) expressed understanding.     - Discussed: Risks, Benefits and Alternatives for BOTH SEDATION and the PROCEDURE were discussed     - Discussed with:  Patient      - Extended Intubation/Ventilatory Support Discussed: Yes.      - Patient is DNR/DNI Status: No    Use of blood products discussed: No .     Postoperative Care    Pain management: IV analgesics, Oral pain medications.   PONV prophylaxis: Ondansetron (or other 5HT-3), Dexamethasone or Solumedrol     Comments:    Other Comments: NG tube to suction during induction.            Orlando Virgen MD

## 2021-12-15 NOTE — OR NURSING
Called into the OR where Dr. Prince is doing procedures and informed the circulator that the pt received her Lovenox this a.m.

## 2021-12-15 NOTE — ANESTHESIA CARE TRANSFER NOTE
Patient: Shira Joy    Procedure: Procedure(s):  INSERTION, PEG TUBE       Diagnosis: Bowel obstruction (H) [K56.609]  Diagnosis Additional Information: No value filed.    Anesthesia Type:   General     Note:    Oropharynx: oropharynx clear of all foreign objects and spontaneously breathing  Level of Consciousness: awake  Oxygen Supplementation: face mask  Level of Supplemental Oxygen (L/min / FiO2): 4  Independent Airway: airway patency satisfactory and stable  Dentition: dentition unchanged  Vital Signs Stable: post-procedure vital signs reviewed and stable  Report to RN Given: handoff report given  Patient transferred to: PACU  Comments: Patient transferred to PACU with TPN, VSS, 50mcg fentanyl given in PACU, report given to RN.   Handoff Report: Identifed the Patient, Identified the Reponsible Provider, Reviewed the pertinent medical history, Discussed the surgical course, Reviewed Intra-OP anesthesia mangement and issues during anesthesia, Set expectations for post-procedure period and Allowed opportunity for questions and acknowledgement of understanding      Vitals:  Vitals Value Taken Time   /98 12/15/21 1600   Temp     Pulse 92 12/15/21 1605   Resp 18 12/15/21 1605   SpO2 100 % 12/15/21 1605   Vitals shown include unvalidated device data.    Electronically Signed By: DEBBI Car CRNA  December 15, 2021  4:05 PM

## 2021-12-15 NOTE — PLAN OF CARE
NG to LCS, patient passing some gas, denies nausea or pain. Portacath with TPN, voiding spont, up ad earl in halls. Glucoses 418/320/313 covered by Insulin sliding scale, Endocrine notified, sliding scale increased, do not want Insulin infusion started at this time. Patient NPO for GT placement in OR today.

## 2021-12-15 NOTE — OP NOTE
Upper GI Endoscopy 12/15/2021  3:06 PM [address] [place] [address] [phone]     Endoscopy Department   _______________________________________________________________________________   Patient Name: Shira Joy           Procedure Date: 12/15/2021 3:06 PM   MRN: [id number]                       Account Number: [id number]   YOB: 1980              Admit Type: Inpatient   Age: 41                                Gender: Female   Note Status: Finalized                Attending MD: August Arrieta MD   Total Sedation Time:                     _______________________________________________________________________________       Procedure:             Upper GI endoscopy   Indications:           Place PEG   Providers:             August Arrieta MD, Viji Barnett MD   Patient Profile:       Ms Joy is a 41 year old woman with a history of                          metastatic gastric adenocarcinoma complicated by                          peritoneal carcinomatosis and ascites who is                          struggling with recurrent obstructive symptoms                          requiring an NG tube for decompression. She now                          proceeds for placement of a decomrpessive gastrostomy                          tube to allow removal of the NG tube.   Referring MD:          Kae Guzman MD   Requesting Provider:   Norman De Luna MD   Medicines:             General Anesthesia, Ancef 2000 mg IV   Complications:         No immediate complications.   _______________________________________________________________________________   Procedure:             Pre-Anesthesia Assessment:                          - Prior to the procedure, a History and Physical was                          performed, and patient medications and allergies were                          reviewed. The patient is competent. The risks and                           benefits of the procedure and the sedation options and                          risks were discussed with the patient. All questions                          were answered and informed consent was obtained.                          Patient identification and proposed procedure were                          verified by the nurse in the pre-procedure area.                          Mental Status Examination: alert and oriented. Airway                          Examination: Mallampati Class II (the uvula but not                          tonsillar pillars visualized). Respiratory                          Examination: clear to auscultation. CV Examination:                          normal. ASA Grade Assessment: III - A patient with                          severe systemic disease. After reviewing the risks and                          benefits, the patient was deemed in satisfactory                          condition to undergo the procedure. The anesthesia                          plan was to use general anesthesia. Immediately prior                          to administration of medications, the patient was                          re-assessed for adequacy to receive sedatives. The                          heart rate, respiratory rate, oxygen saturations,                          blood pressure, adequacy of pulmonary ventilation, and                          response to care were monitored throughout the                          procedure. The physical status of the patient was                          re-assessed after the procedure. After obtaining                          informed consent, the endoscope was passed under                          direct vision. Throughout the procedure, the patient's                          blood pressure, pulse, and oxygen saturations were                          monitored continuously. The gastroscope was introduced                          through the mouth, and  advanced to the second part of                          duodenum. The upper GI endoscopy was accomplished                          without difficulty. The patient tolerated the                          procedure well.                                                                                     Findings:        The patient was supine throughout.  films showed the NG tube which        was removed. The proximal, mid and distal esophagus were unremarkable        with the squamocolumnar line found at the gastroesopahgeal junction        without significant irregularity. The majority of the anterior wall of        the stomach was involved with the malignancy, save for a small window        within the distal antrum. The bulb and sweep were unremarkable. The        stomach was insufflated to appose gastric and abdominal walls. A site        was located in the antrum of the stomach with excellent        transillumination, manual external pressure and fluoroscopy for        placement. The abdominal wall was marked and prepped in a sterile        manner. The area was anesthetized with 1 mL of 1% lidocaine. The trocar        needle was introduced through the abdominal wall and into the stomach        under both fluoroscopic and direct endoscopic view. A snare was        introduced through the endoscope and opened in the gastric lumen. The        guide wire was passed through the trocar and into the open snare. The        snare was closed around the guide wire. The endoscope and snare were        removed, pulling the wire out through the mouth. A skin incision was        made at the site of needle insertion. The externally removable 24 Fr        gastrostomy tube was lubricated. The G-tube was tied to the guide wire        and pulled through the mouth and into the stomach. The trocar needle was        removed, and the gastrostomy tube was pulled out from the stomach        through the skin. The external bumper was  attached to the gastrostomy        tube, and the tube was cut to remove the guide wire. The final position        of the gastrostomy tube was confirmed by relook endoscopy, and skin        marking noted to be 3.5 cm at the external bumper. We then passed a        single T tag adjacent to the fresh gastrostomy to allow for a        gastropexy. A second tag was not attempted due to the narrow window. The        final tension and compression of the abdominal wall by the PEG tube and        external bumper were checked and revealed that the bumper was loose and        lightly touching the skin. The feeding tube was capped, and the tube        site cleaned and dressed.                                                                                     Impression:            -NG tube removed                          - Uncomplicated placement of a decompressisve                          gastrostomy tube through a narrow window across the                          distal antrum with adjunct single tag gastrostomy as                          described above   Recommendation:        - General anesthesia recovery with return to the floor                          when appropriate                          - Abdominal exams in 2 and 4h to ensure no evidence of                          complication such as bleeding or perforation (not                          anticipated)                          - G tube may be used for decompression (never feeding                          given obstruction) without delay                          - All medications and activity may resume upon return                          to the floor                          - The white button of the T tag should be removed by                          cutting the underlying string in 2w if still present.                          - The findings and recommendations were discussed with                          the patient and their family                                                                                        electronically signed by SOFY Arrieta

## 2021-12-15 NOTE — PROGRESS NOTES
Hematology / Oncology  Daily Progress Note   Date of Service: 12/15/2021  Patient: Shira Joy  MRN: [id number]  Admission Date: 12/12/2021  Hospital Day # 2  Cancer Diagnosis: Metastatic gastric adenocarcinoma  Primary Outpatient Oncologist: Dr. Peterson ([place]), Dr. Schmidt ([facility])  Current Treatment Plan: FOLFOX + trastuzumab + pembrolizumab       Recommendations:   - GI to attempt PEG tube this afternoon  - She will need teaching on PEG management, along with TPN teaching and home services  - Appreciate endocrinology involvement -- will need modulation to her insulin plan once started on TPN  - Oncology will follow    Assessment & Plan:   Ms. Shira Joy is a 40yo with metastatic gastric adenocarcinoma with metastases to the liver, ovaries, and peritoneum treated with FOLFOX + trastuzumab + pembrolizumab and c/b autoimmune diabetes and recent SBO who presented recurrent abdominal pain and new emesis found to have recurrent SBO.     Diagnoses:  # Metastatic gastric adenocarcinoma  # Malignant SBO  # Autoimmune DM     Patient's reports of passing small BM and gas strongly suggestive of partial SBO. She is cognizant that this will likely remain a problem in the future w/o reduction of tumor burden. She was escalated to a CLD yesterday. She inquired about escalation to FLD, and it was clarified that while this is the normal progression of nutrition for typical SBO, she should be cautious as she is prone to recurrent SBO's and would have to be NPO again for that. Per GI's note, an attempt will be made to place her PEG, which should provide substantial relief to the patient. Lastly, she was hyperglycemic to 400's last night; this will likely resolve with an adjustment to her insulin by endocrinology.    Oncologic History:  Oncologists  [place]: Dr. Peterson  [facility]: Dr. Schmidt     Diagnosis: Metastatic gastric adenocarcinoma     Treatment history (copied & updated from last [place] document in  [facility] on 10/14/21)     7/24/2021 Initial Diagnosis   She presented with few weeks of abdominal pain, which she attributed to her known fibroids. On 07/24/2021, she developed acute onset of severe abdominal pain prompting emergency department evaluation. CT of the abdomen revealed free air concerning for viscus perforation. She underwent exploratory laparotomy which revealed a perforated gastric ulcer. This was repaired with a Frank patch. Additionally, she was noted to have omental and peritoneal nodules which were biopsied. Intraoperative EGD revealed a large gastric mass which was also biopsied.    7/24/2021 Biopsy/Pathology   Gastric biopsy: Poorly differentiated adenocarcinoma with focal Signet ring formation. Immunohistochemical stain for Helicobacter pylori is negative.     Additional biopsies from the mesentery, gastrocolic ligament, and a left pelvic sidewall nodule were all consistent with metastatic poorly differentiated adenocarcinoma.    HER2 FISH was positive for HER2 amplification (HER2/D17Z! Ratio: 2.75)    PD-L1 and MMR testing were pending at the time of initial consultation.     9/3/2021 Biopsy/Pathology   PDL1, mismatch repair, and HER2 IHC on the initial 7/24 biopsy were delayed due to inadvertent cancellation of the tests initially.     Results were ultimately reported 9/2021:     PD-L1 CPS 10 (PD-L1 clone 22C3: tumor cells 0%, TILs 10%)    Mismatch repair intact (IHC)    HER2 1+ (IHC)    9/13/2021 Genetic Testing and Tumor Genotyping   Strong family history esophageal, gastric, colon, and other cancer. Clinical genetics consult obtained, and Common Hereditary Cancers Panel ([facility]) sent      Started on FOLFOX + trastuzumab + pembrolizumab in 08/2021. Plan at 10/14/21 follow-up was to continue this for an additional two months given difficulty in interpreting her restaging scans (given lack of ideal timing for her initial scans).         Patient was seen and plan of care  "was discussed with attending physician Lyric Angulo.    Thank you for the opportunity to partake in this patients plan of care. Please do not hesitate to page with questions. We will continue to follow.     Jake Charlton MD PhD  Hematology/Oncology Fellow  Pager: [phone]  ___________________________________________________________________    Subjective & Interval History:    No acute events noted overnight. Patient reported having a single bowel movement yesterday, which is again, reassuring. Her pain is reduced. She had no other complaints today. Patient informed to be diligent about her abdominal pain in the future. She had seen GI earlier that day, and was told that there was some some review to be done before the next steps could be planned.        Complete and Comprehensive review of systems review and negative other than noted here or in the HPI.     Physical Exam:    Blood pressure 131/87, pulse 66, temperature 98.3  F (36.8  C), temperature source Oral, resp. rate 16, height 1.6 m (5' 3\"), weight 39.9 kg (88 lb), SpO2 98 %.  Physical Exam  Constitutional:       General: She is not in acute distress.     Appearance: She is not toxic-appearing.      Comments: Thin appearing   HENT:      Head: Normocephalic and atraumatic.      Nose:      Comments: NG in place  Cardiovascular:      Rate and Rhythm: Normal rate and regular rhythm.      Pulses: Normal pulses.      Heart sounds: Normal heart sounds.   Pulmonary:      Effort: Pulmonary effort is normal.      Breath sounds: Normal breath sounds.   Abdominal:      General: Abdomen is flat.      Palpations: Abdomen is soft.   Skin:     General: Skin is warm and dry.      Capillary Refill: Capillary refill takes less than 2 seconds.      Coloration: Skin is not jaundiced.   Neurological:      General: No focal deficit present.      Mental Status: She is alert and oriented to person, place, and time. Mental status is at baseline.          Labs & " Studies: I personally reviewed the following studies:  ROUTINE LABS (Last four results):  CMP  Recent Labs   Lab 12/15/21  1142 12/15/21  0931 12/15/21  0728 12/15/21  0621 12/14/21  0721 12/14/21  0707 12/13/21  0814 12/13/21  0702 12/12/21  2028   NA  --   --  131*  --   --  137  --   --  137   POTASSIUM  --   --  4.6  --   --  3.8  --  3.8 3.8   CHLORIDE  --   --  98  --   --  103  --   --  102   CO2  --   --  21  --   --  24  --   --  25   ANIONGAP  --   --  12  --   --  10  --   --  10   * 320* 418* 378*   < > 192*   < >  --  146*   BUN  --   --  13  --   --  10  --   --  11   CR  --   --  0.53  --   --  0.58  --   --  0.52   GFRESTIMATED  --   --  >90  --   --  >90  --   --  >90   JORGE L  --   --  8.6  --   --  8.6  --   --  9.2   MAG  --   --  2.2  --   --  2.1  --   --   --    PHOS  --   --  3.6  --   --  3.3  --   --   --    PROTTOTAL  --   --  7.0  --   --   --   --   --  7.5   ALBUMIN  --   --  3.0*  --   --   --   --   --  3.3*   BILITOTAL  --   --  0.8  --   --   --   --   --  0.6   ALKPHOS  --   --  72  --   --   --   --   --  76   AST  --   --  15  --   --   --   --   --  23   ALT  --   --  24  --   --   --   --   --  32    < > = values in this interval not displayed.     CBC  Recent Labs   Lab 12/15/21  0728 12/14/21 0707 12/12/21 2028 12/09/21 0723   WBC 3.3* 3.3* 7.3  --    RBC 3.40* 3.18* 3.50*  --    HGB 10.1* 9.3* 10.2* 9.3*   HCT 31.4* 29.8* 30.9*  --    MCV 92 94 88  --    MCH 29.7 29.2 29.1  --    MCHC 32.2 31.2* 33.0  --    RDW 14.6 14.8 15.1*  --     227 250  --      INR  Recent Labs   Lab 12/15/21  0728   INR 1.08       Medications list for reference:  Current Facility-Administered Medications   Medication     benzocaine 20% (HURRICAINE/TOPEX) 20 % spray 0.5-1 mL     dextrose 10% infusion     glucose gel 15-30 g    Or     dextrose 50 % injection 25-50 mL    Or     glucagon injection 1 mg     HYDROmorphone (DILAUDID) injection 0.2-0.4 mg     insulin 1 unit/mL in saline  (NovoLIN, HumuLIN Regular) drip - ADULT IV Infusion     insulin aspart (NovoLOG) injection (RAPID ACTING)     insulin aspart (NovoLOG) injection (RAPID ACTING)     insulin aspart (NovoLOG) injection (RAPID ACTING)     lidocaine (LMX4) cream     lidocaine (XYLOCAINE) 4 % solution 5 mL     lidocaine 1 % 0.1-1 mL     lipids (INTRALIPID) 20 % infusion 250 mL     LORazepam (ATIVAN) injection 0.5 mg     melatonin tablet 1 mg     naloxone (NARCAN) injection 0.2 mg    Or     naloxone (NARCAN) injection 0.4 mg    Or     naloxone (NARCAN) injection 0.2 mg    Or     naloxone (NARCAN) injection 0.4 mg     ondansetron (ZOFRAN-ODT) ODT tab 4 mg    Or     ondansetron (ZOFRAN) injection 4 mg     oxymetazoline (AFRIN) 0.05 % spray 2 spray     parenteral nutrition - ADULT compounded formula     parenteral nutrition - ADULT compounded formula     prochlorperazine (COMPAZINE) injection 10 mg    Or     prochlorperazine (COMPAZINE) tablet 10 mg    Or     prochlorperazine (COMPAZINE) suppository 25 mg     sodium chloride (PF) 0.9% PF flush 3 mL     sodium chloride (PF) 0.9% PF flush 3 mL

## 2021-12-15 NOTE — CONSULTS
GASTROENTEROLOGY CONSULTATION      Date of Admission:  12/12/2021  Reason for Admission: Malignant bowel obstruction  Date of Consult  12/15/2021   Requesting Physician:  Tony Howard MD         Reason for Consultation:   Venting PEG tube           History of Present Illness:   Patient seen and examined at 1045. History is obtained from the patient.    Request for: venting G tube  Indication: malignant bowel obstruction  Recent imaging of the abdomen: CT scan abd/pelv with IV contrast 12/12/21    Sepsis: no  Abdominal wall infection: no  Previous PEG-J: no  BMI: 15.59                  Review of Systems:     A complete review of systems was performed and is negative except as noted in the HPI        History reviewed. No pertinent surgical history.           Physical Exam:   Temp: 98.3  F (36.8  C) Temp src: Oral BP: 131/87 Pulse: 66   Resp: 16 SpO2: 98 % O2 Device: None (Room air)    Wt:   Wt Readings from Last 2 Encounters:   12/15/21 39.9 kg (88 lb)   12/06/21 42.2 kg (93 lb)        General: Thin female in NAD.  Answers appropriately.    Oropharynx is clear, moist and w/o exudate or lesions. NGT present to LIS  Lungs: Clear to auscultation anterior.  No wheezes, rhonchi or crackles.    Heart: Regular rate and rhythm.  No murmurs, gallops or rubs.  Normal S1 and S2.  Abdomen: Soft, non-tender, non-distended.  BS +.  No peritoneal signs  Neurologic: Grossly non-focal.  CN 2-12 grossly intact.            Data:   Labs and imaging below were independently reviewed and interpreted    LAB WORK:    BMP  Recent Labs   Lab 12/15/21  1142 12/15/21  0931 12/15/21  0728 12/15/21  0621 12/14/21  0721 12/14/21  0707 12/13/21  0814 12/13/21  0702 12/12/21 2028   NA  --   --  131*  --   --  137  --   --  137   POTASSIUM  --   --  4.6  --   --  3.8  --  3.8 3.8   CHLORIDE  --   --  98  --   --  103  --   --  102   JORGE L  --   --  8.6  --   --  8.6  --   --  9.2   CO2  --   --  21  --   --  24  --   --  25   BUN  --   --  13  --    --  10  --   --  11   CR  --   --  0.53  --   --  0.58  --   --  0.52   * 320* 418* 378*   < > 192*   < >  --  146*    < > = values in this interval not displayed.     CBC  Recent Labs   Lab 12/15/21  0728 12/14/21  0707 12/12/21 2028   WBC 3.3* 3.3* 7.3   RBC 3.40* 3.18* 3.50*   HGB 10.1* 9.3* 10.2*   HCT 31.4* 29.8* 30.9*   MCV 92 94 88   MCH 29.7 29.2 29.1   MCHC 32.2 31.2* 33.0   RDW 14.6 14.8 15.1*    227 250     INR  Recent Labs   Lab 12/15/21  0728   INR 1.08     Albumin  Recent Labs   Lab 12/15/21  0728 12/12/21 2028   ALBUMIN 3.0* 3.3*          IMAGING:  EXAM: CT ABDOMEN PELVIS W CONTRAST  LOCATION: [facility]  DATE/TIME: 12/12/2021 9:31 PM     INDICATION: Abdominal pain, acute, nonlocalized.  COMPARISON: 12/06/2021.  TECHNIQUE: CT scan of the abdomen and pelvis was performed following injection of IV contrast. Multiplanar reformats were obtained. Dose reduction techniques were used.  CONTRAST: 57 ml Isovue 370.      FINDINGS:   LOWER CHEST: Breast implants.     HEPATOBILIARY: 1.6 cm hypodense solid lesion dome of the liver. Layering calcified gallstones.     PANCREAS: Normal.     SPLEEN: Normal.     ADRENAL GLANDS: Normal.     KIDNEYS/BLADDER: Normal.     BOWEL: Increasing distention of small bowel compatible with mechanical small bowel obstruction. Transition seen in the right lower abdomen. Distal small bowel decompressed. Again seen is wall thickening in the stomach with perigastric soft tissue.     LYMPH NODES: Normal.     VASCULATURE: Unremarkable.     PELVIC ORGANS: Large complex multilobulated solid enhancing pelvic mass with some cystic change which is stable. Stable ascites.     MUSCULOSKELETAL: Normal.                                                                      IMPRESSION:   1.  Changes of mechanical small bowel obstruction with transition of small bowel seen in the right lower abdomen. Findings have progressed with  increasing distention of the small bowel.     2.  No change in the gastric mass or the large multi lobulated inhomogeneous enhancing conglomerate masses in the pelvis.     3.  Stable 1.6 cm hypodense nodule dome of the liver.           ASSESSMENT AND RECOMMENDATIONS:   Assessment:  41 year old female with a history of metastatic gastric adenocarcinoma with peritoneal carcinomatosis, ascites who is admitted with recurrent malignant bowel obstruction who has failed NGT clamping trial. Request for venting G tube. IR deferred due to poor window r/t overlying liver. Dr. Arrieta planning to evaluate endoscopically under fluoroscopy for a window     Recommendations:  Plan for attempt at PEG tube this afternoon (on add on list) -- if this gets pushed back will be planned for tomorrow  Anticoagulation to be held: Lovenox subQ  Last COVID test 12/13, negative  NPO now  Continue NGT to LIS  Discussed with primary medicine team Dr. Howard      Thank you for involving us in this patient's care. Please do not hesitate to contact the GI service with any questions or concerns.     Pt seen and care plan discussed with Dr. De Luna and Dr. Arrieta, GI staff physician.    Kylah Jones PA-C  Advanced Endoscopy/Pancreaticobiliary [facility]  [facility]  Pager *[phone]  =======================================================================

## 2021-12-15 NOTE — PROGRESS NOTES
[facility]    Medicine Progress Note - Hospitalist Service, Gold 8       Date of Admission:  12/12/2021    Assessment & Plan          Shira Joy is a 41 year old female admitted on 12/12/2021. She has PMHx of metastatic gastric adenocarcinoma with liver and ovarian metastases and peritoneal carcinomatosis (diagnosed 7/2021, on treatment with FOLFOX plus trastuzumab and keydtruda since 8/25/21) and recent recurrent SBO (most recently admitted 12/6-12/9) presented with abdominal pain and vomiting found to have another episode of SBO.     Recurrent Small Bowel Obstruction  Gastric Adenocarcinoma with metastases to liver and ovaries, peritoneal carcinomatosis  She was doing well for the past 3 days since her 12/9 discharge until around 1pm on the day of admission. She has her primary oncologist appointment scheduled on 12/13. CT abdomen showed changes of mechanical small bowel obstruction with transition of small bowel seen in the right lower abdomen. Likely due to underlying malignant peritoneal disease. Hemodynamically stable. Surgery was consulted and recommended conservative management with NG tube decompression, IVF and pain management as well as consideration of palliative G tube placement.   - NG tube placed, LIS  - TPN started 12/15  - change PPI to IV  - oncology following, ideally could get venting Gtube:   *surgery consulted: not surgical candidate   *IR consulted 12/14: not IR candidate   *consulted GI, 12/15 attempting to get on schedule for PEG 12/15, GI acknowledges pt received prophylaxis dose lovenox received at 08:53 but risk of significant bleed remains low even in this setting. 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 A/W autoimmune related hypothalamic, adrenal, thyroid, diabetes.   Normal adrenal, thyroid labs on last admission at [facility]. During her recent admission, endocrine was consulted with recommendation to discharge patient on 12 units of Lantus daily, 1 unit of short acting insulin for every 15 g of carbohydrates with meals and snacks, and continuation of her home sliding scale.    - appreciate endo recs:  Plan:                  -add regular insulin to TPN starting night of 12/15, s/p lantus 6 and NPH 10 AM 12/15, HDISS  4hr (see endo note for details)    Malnutrition:    - Level of malnutrition: Severe   - Based on: moderate/severe subcutaneous fat loss, moderate/severe muscle loss       Diet: parenteral nutrition - ADULT compounded formula  parenteral nutrition - ADULT compounded formula  NPO per Anesthesia Guidelines for Procedure/Surgery Except for: Meds    DVT Prophylaxis: Enoxaparin (Lovenox) SQ  Magana Catheter: Not present  Central Lines: None  Code Status: Full Code      Disposition Plan   Expected Discharge: 12/16/2021     Anticipated discharge location:  Awaiting care coordination huddle  Delays:            The patient's care was discussed with the Bedside Nurse, Patient and oncology Consultant.and IR consultant and GI consultant.    Tony Howard MD  Hospitalist Service, [address]  Securely message with the Vocera Web Console (learn more here)  Text page via [employer] Paging/Directory    Please see sign in/sign out for up to date coverage information    Clinically Significant Risk Factors Present on Admission           # Severe Malnutrition, POA: based on Weight loss;Reduced intake;Subcutaneous fat loss;Muscle loss (12/14/21 1100)  ______________________________________________________________________    Interval History   Intermittent pangs of pelvic pain, requests tylenol. No emesis. Passing gas no additional bowel movement.    4 point ROS otherwise negative.     Data reviewed today: I reviewed all medications, new labs  and imaging results over the last 24 hours. I personally reviewed no images or EKG's today.    Physical Exam   Vital Signs: Temp: 98.7  F (37.1  C) Temp src: Temporal BP: 136/86 Pulse: 89   Resp: 16 SpO2: 100 % O2 Device: None (Room air)    Weight: 88 lbs 0 oz    Physical Exam   Constitutional: thin NAD  HEENT: EOMI, NGT in place soild brown output  Neck: Symmetric  Cardiovascular: regular without murmurs or gallops  Pulmonary/Chest: CTAB. No respiratory distress.   GI: Soft, non-tender , non-distended    Musculoskeletal:  No lower extremity edema   Skin: Skin is warm and dry. Port accessed  Neurological: Alert and oriented   Psychiatric:  hypothymic      Data   Recent Labs   Lab 12/15/21  1420 12/15/21  1142 12/15/21  0931 12/15/21  0728 12/14/21  0721 12/14/21  0707 12/13/21  0814 12/13/21  0702 12/12/21 2028   WBC  --   --   --  3.3*  --  3.3*  --   --  7.3   HGB  --   --   --  10.1*  --  9.3*  --   --  10.2*   MCV  --   --   --  92  --  94  --   --  88   PLT  --   --   --  283  --  227  --   --  250   INR  --   --   --  1.08  --   --   --   --   --    NA  --   --   --  131*  --  137  --   --  137   POTASSIUM  --   --   --  4.6  --  3.8  --  3.8 3.8   CHLORIDE  --   --   --  98  --  103  --   --  102   CO2  --   --   --  21  --  24  --   --  25   BUN  --   --   --  13  --  10  --   --  11   CR  --   --   --  0.53  --  0.58  --   --  0.52   ANIONGAP  --   --   --  12  --  10  --   --  10   JORGE L  --   --   --  8.6  --  8.6  --   --  9.2   * 313* 320* 418*   < > 192*   < >  --  146*   ALBUMIN  --   --   --  3.0*  --   --   --   --  3.3*   PROTTOTAL  --   --   --  7.0  --   --   --   --  7.5   BILITOTAL  --   --   --  0.8  --   --   --   --  0.6   ALKPHOS  --   --   --  72  --   --   --   --  76   ALT  --   --   --  24  --   --   --   --  32   AST  --   --   --  15  --   --   --   --  23    < > = values in this interval not displayed.

## 2021-12-15 NOTE — PLAN OF CARE
Neuro: A&Ox4  Respiratory: RA  Cardiac: -  GI: patient denies nausea. NG to LCS. Bowel sounds audible, abdomen is flat.  Clear liquid diet.   : voiding spontaneously   Pain: Denies pain  IV access: Port infusing TPN and Lipids   Mobility: up ad earl       Plan of care: glucose checks q4. BG was high this AM. Insulin given. Team paged.

## 2021-12-15 NOTE — ANESTHESIA POSTPROCEDURE EVALUATION
Patient: Shira Joy    Procedure: Procedure(s):  INSERTION, PEG TUBE       Diagnosis:Bowel obstruction (H) [K56.609]  Diagnosis Additional Information: No value filed.    Anesthesia Type:  General    Note:  Disposition: Inpatient   Postop Pain Control: Uneventful            Sign Out: Well controlled pain   PONV: No   Neuro/Psych: Uneventful            Sign Out: Acceptable/Baseline neuro status   Airway/Respiratory: Uneventful            Sign Out: Acceptable/Baseline resp. status   CV/Hemodynamics: Uneventful            Sign Out: Acceptable CV status; No obvious hypovolemia; No obvious fluid overload   Other NRE: NONE   DID A NON-ROUTINE EVENT OCCUR? No           Last vitals:  Vitals Value Taken Time   /95 12/15/21 1645   Temp 37.3  C (99.14  F) 12/15/21 1650   Pulse 67 12/15/21 1651   Resp 20 12/15/21 1651   SpO2 100 % 12/15/21 1651   Vitals shown include unvalidated device data.    Electronically Signed By: Noel Teresa MD  December 15, 2021  4:52 PM

## 2021-12-15 NOTE — ANESTHESIA PROCEDURE NOTES
Airway       Patient location during procedure: OR       Procedure Start/Stop Times: 12/15/2021 3:11 PM  Staff -        CRNA: Lyric Nuñez APRN CRNA       Performed By: CRNA  Consent for Airway        Urgency: elective  Indications and Patient Condition       Indications for airway management: britni-procedural         Mask difficulty assessment: 0 - not attempted    Final Airway Details       Final airway type: endotracheal airway       Successful airway: ETT - single  Endotracheal Airway Details        ETT size (mm): 7.0       Successful intubation technique: direct laryngoscopy       DL Blade Type: MAC 3       Grade View of Cords: 2       Adjucts: stylet       Position: Right       Measured from: lips       Secured at (cm): 21       Bite block used: None    Post intubation assessment        Number of attempts at approach: 1       Number of other approaches attempted: 0       Secured with: pink tape       Ease of procedure: easy       Dentition: Intact and Unchanged

## 2021-12-15 NOTE — PLAN OF CARE
"Vital signs:  Temp: 98.5  F (36.9  C) Temp src: Temporal BP: 124/76 Pulse: 64   Resp: 16 SpO2: 99 % O2 Device: None (Room air)   Height: 160 cm (5' 3\") Weight: 41.3 kg (91 lb)  Estimated body mass index is 16.12 kg/m  as calculated from the following:    Height as of this encounter: 1.6 m (5' 3\").    Weight as of this encounter: 41.3 kg (91 lb).     Patient denies pain, nausea or vomiting this shift.  NG has 50 ml output and no oxygen needs.  Voids spontaneously and independently.  No BM this shift.  TPN and lipids initiated and running through port.  Up independently and without assistance.  Spouse hear all evening.  Waiting to hear plan to initiate teaching towards discharge goals.  Will continue POC.    "

## 2021-12-15 NOTE — PROVIDER NOTIFICATION
Med Gold Provider notified at 0225 about patients blood sugar of 312. Correction insulin was given     No provider response yet

## 2021-12-15 NOTE — PROGRESS NOTES
IP Diabetes Management  Daily Note           Assessment and Plan:   HPI: Shira Joy is a 41 year old female admitted on 12/12/2021. She has PMHx of metastatic gastric adenocarcinoma with liver and ovarian metastases and peritoneal carcinomatosis (diagnosed 7/2021, on treatment with FOLFOX plus trastuzumab and keydtruda since 8/25/21) and recent recurrent SBO (most recently admitted 12/6-12/9) presented with abdominal pain and vomiting found to have another episode of SBO.    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    2) SBO, NG placed  3) gastric adenocarcinoa with mets to liver, ovaries, peritoneal carcinamatosis      Plan:    -Lantus 6 units every AM   -NPH 10 units once this AM   -start novolog 1:10g CHO with meals, snacks   -increase to Novolog medium resistanse sliding scale insulin from Low intensity sliding scale Q 4 hours.  UPDATE---will increase to high sliding scale insulin given BG into 300s at noon   -add 12 units of regular insulin to TPN to start tonight (0.10units/g dextrose) for 120g dextrose in continuous TPN (this is with Lantus 6 units on board)   -BG monitoring Q 4 hours   -hypoglycemia protocol   -needs education about TPN and insulin in TPN    Outpatient follow up: TBD  Plan discussed with patient, bedside RN, RD, and primary team through this note.       Interval History and Assessment: interval glucose trend reviewed:           BG rising into 300s this AM, likely due to no insulin in TPN and she has higher needs.  Will give one time dose of NPH 10 units now, increase to moderate sliding scale insulin.  Will add 12 units to TPN tonight for 120g dextrose.   (0.10units/g dextrose).  She also had small amount of clear liquid intake yesterday with broth, frozen Icee.  No CHO coverage ordered yesterday for this as it was minimal, but likely contributed to her hyperglycemia.    Per patient, she  was doing insulin injections PTA.  Has not done insulin in TPN before.  She may receive PEG tube today.     Current nutritional intake and type: Orders Placed This Encounter      Clear Liquid Diet      Planned Procedures/surgeries: possible G tube with GI  Steroid planning: none  D5W-containing solutions/medications: TPN continuous to provide 90g dextrose to increase to 120g dextrose at 2000 tonight    PTA Diabetes Regimen:   Lantus 12 units once daily, novolog 1:15g CHO with meals, and 1:50>150 sliding scale insulin.      Discharge Planning: TBD           Diabetes History:   Type of Diabetes: autoimmune DM  Lab Results   Component Value Date    A1C 7.1 12/07/2021              Review of Systems:     The Review of Systems is negative other than noted in the Interval History.           Medications:     Current Facility-Administered Medications   Medication     benzocaine 20% (HURRICAINE/TOPEX) 20 % spray 0.5-1 mL     dextrose 10% infusion     glucose gel 15-30 g    Or     dextrose 50 % injection 25-50 mL    Or     glucagon injection 1 mg     enoxaparin ANTICOAGULANT (LOVENOX) injection 40 mg     HYDROmorphone (DILAUDID) injection 0.2-0.5 mg     insulin 1 unit/mL in saline (NovoLIN, HumuLIN Regular) drip - ADULT IV Infusion     insulin aspart (NovoLOG) injection (RAPID ACTING)     insulin glargine (LANTUS PEN) injection 6 Units     insulin NPH injection 10 Units     lidocaine (LMX4) cream     lidocaine (XYLOCAINE) 4 % solution 5 mL     lidocaine 1 % 0.1-1 mL     lipids (INTRALIPID) 20 % infusion 250 mL     LORazepam (ATIVAN) injection 0.5 mg     melatonin tablet 1 mg     naloxone (NARCAN) injection 0.2 mg    Or     naloxone (NARCAN) injection 0.4 mg    Or     naloxone (NARCAN) injection 0.2 mg    Or     naloxone (NARCAN) injection 0.4 mg     ondansetron (ZOFRAN-ODT) ODT tab 4 mg    Or     ondansetron (ZOFRAN) injection 4 mg     oxymetazoline (AFRIN) 0.05 % spray 2 spray     parenteral nutrition - ADULT compounded  "formula     prochlorperazine (COMPAZINE) injection 10 mg    Or     prochlorperazine (COMPAZINE) tablet 10 mg    Or     prochlorperazine (COMPAZINE) suppository 25 mg     sodium chloride (PF) 0.9% PF flush 3 mL     sodium chloride (PF) 0.9% PF flush 3 mL            Physical Exam:    /87 (BP Location: Right arm)   Pulse 66   Temp 98.3  F (36.8  C) (Oral)   Resp 16   Ht 1.6 m (5' 3\")   Wt 41.3 kg (91 lb)   SpO2 98%   BMI 16.12 kg/m    General: pleasant, in no distress.   HEENT: normocephalic, atraumatic. Oral mucous membranes moist. NGT in place.  Lungs: unlabored respiration, no cough  ABD: rounded, nondistended  Skin: warm and dry, no obvious lesions  MSK:  moves all extremities  Lymp:  no LE edema   Mental status:  alert, oriented to self, place, time  Psych:  affect, calm and appropriate interaction             Data:     Recent Labs   Lab 12/15/21  0621 12/15/21  0204 12/14/21  2238 12/14/21  1917 12/14/21  1424 12/14/21  1034   * 312* 282* 194* 287* 230*     Lab Results   Component Value Date    WBC 3.3 (L) 12/14/2021    WBC 7.3 12/12/2021    WBC 4.9 12/08/2021    HGB 9.3 (L) 12/14/2021    HGB 10.2 (L) 12/12/2021    HGB 9.3 (L) 12/09/2021    HCT 29.8 (L) 12/14/2021    HCT 30.9 (L) 12/12/2021    HCT 28.7 (L) 12/08/2021    MCV 94 12/14/2021    MCV 88 12/12/2021    MCV 92 12/08/2021     12/14/2021     12/12/2021     12/08/2021     Lab Results   Component Value Date     12/14/2021     12/12/2021     12/08/2021    POTASSIUM 3.8 12/14/2021    POTASSIUM 3.8 12/13/2021    POTASSIUM 3.8 12/12/2021    CHLORIDE 103 12/14/2021    CHLORIDE 102 12/12/2021    CHLORIDE 104 12/08/2021    CO2 24 12/14/2021    CO2 25 12/12/2021    CO2 27 12/08/2021     (H) 12/15/2021     (H) 12/15/2021     (H) 12/14/2021     Lab Results   Component Value Date    BUN 10 12/14/2021    BUN 11 12/12/2021    BUN 6 (L) 12/08/2021     No results found for: TSH  Lab Results "   Component Value Date    AST 23 12/12/2021    AST 28 12/06/2021    ALT 32 12/12/2021    ALT 65 (H) 12/06/2021    ALKPHOS 76 12/12/2021    ALKPHOS 84 12/06/2021           35 minutes spent on the date of the encounter doing chart review, history and exam, documentation and further activities per the note      Over 50% of my time on the unit was spent counseling the patient and/or coordinating care regarding acute hyperglycemia management.  See note for details.    To contact Endocrine Diabetes service:   From 8AM-4PM: page inpatient diabetes provider that is following the patient  For questions or updates from 4PM-8AM: page the diabetes job code for on call fellow: [phone]    DEBBI Cleaning, CNP  Inpatient Diabetes Management Service  Pager [phone]

## 2021-12-16 ENCOUNTER — APPOINTMENT (OUTPATIENT)
Dept: EDUCATION SERVICES | Facility: CLINIC | Age: 41
End: 2021-12-16
Attending: HOSPITALIST
Payer: COMMERCIAL

## 2021-12-16 LAB
ANION GAP SERPL CALCULATED.3IONS-SCNC: 10 MMOL/L (ref 3–14)
BUN SERPL-MCNC: 13 MG/DL (ref 7–30)
CALCIUM SERPL-MCNC: 8.5 MG/DL (ref 8.5–10.1)
CHLORIDE BLD-SCNC: 98 MMOL/L (ref 94–109)
CO2 SERPL-SCNC: 26 MMOL/L (ref 20–32)
CREAT SERPL-MCNC: 0.5 MG/DL (ref 0.52–1.04)
ERYTHROCYTE [DISTWIDTH] IN BLOOD BY AUTOMATED COUNT: 14.6 % (ref 10–15)
GFR SERPL CREATININE-BSD FRML MDRD: >90 ML/MIN/1.73M2
GLUCOSE BLD-MCNC: 243 MG/DL (ref 70–99)
GLUCOSE BLDC GLUCOMTR-MCNC: 209 MG/DL (ref 70–99)
GLUCOSE BLDC GLUCOMTR-MCNC: 223 MG/DL (ref 70–99)
GLUCOSE BLDC GLUCOMTR-MCNC: 229 MG/DL (ref 70–99)
GLUCOSE BLDC GLUCOMTR-MCNC: 275 MG/DL (ref 70–99)
GLUCOSE BLDC GLUCOMTR-MCNC: 278 MG/DL (ref 70–99)
GLUCOSE BLDC GLUCOMTR-MCNC: 285 MG/DL (ref 70–99)
HCT VFR BLD AUTO: 28.9 % (ref 35–47)
HGB BLD-MCNC: 9.6 G/DL (ref 11.7–15.7)
MAGNESIUM SERPL-MCNC: 2 MG/DL (ref 1.6–2.3)
MCH RBC QN AUTO: 29.9 PG (ref 26.5–33)
MCHC RBC AUTO-ENTMCNC: 33.2 G/DL (ref 31.5–36.5)
MCV RBC AUTO: 90 FL (ref 78–100)
PHOSPHATE SERPL-MCNC: 3.6 MG/DL (ref 2.5–4.5)
PLATELET # BLD AUTO: 293 10E3/UL (ref 150–450)
POTASSIUM BLD-SCNC: 3.8 MMOL/L (ref 3.4–5.3)
RBC # BLD AUTO: 3.21 10E6/UL (ref 3.8–5.2)
SODIUM SERPL-SCNC: 134 MMOL/L (ref 133–144)
WBC # BLD AUTO: 3.1 10E3/UL (ref 4–11)

## 2021-12-16 PROCEDURE — 82310 ASSAY OF CALCIUM: CPT | Performed by: INTERNAL MEDICINE

## 2021-12-16 PROCEDURE — 36591 DRAW BLOOD OFF VENOUS DEVICE: CPT | Performed by: INTERNAL MEDICINE

## 2021-12-16 PROCEDURE — 85027 COMPLETE CBC AUTOMATED: CPT | Performed by: INTERNAL MEDICINE

## 2021-12-16 PROCEDURE — 99233 SBSQ HOSP IP/OBS HIGH 50: CPT | Performed by: INTERNAL MEDICINE

## 2021-12-16 PROCEDURE — 120N000002 HC R&B MED SURG/OB UMMC

## 2021-12-16 PROCEDURE — 250N000009 HC RX 250: Performed by: INTERNAL MEDICINE

## 2021-12-16 PROCEDURE — G0463 HOSPITAL OUTPT CLINIC VISIT: HCPCS

## 2021-12-16 PROCEDURE — 250N000009 HC RX 250: Performed by: HOSPITALIST

## 2021-12-16 PROCEDURE — 84100 ASSAY OF PHOSPHORUS: CPT | Performed by: INTERNAL MEDICINE

## 2021-12-16 PROCEDURE — 99233 SBSQ HOSP IP/OBS HIGH 50: CPT | Performed by: NURSE PRACTITIONER

## 2021-12-16 PROCEDURE — 250N000011 HC RX IP 250 OP 636: Performed by: INTERNAL MEDICINE

## 2021-12-16 PROCEDURE — 99233 SBSQ HOSP IP/OBS HIGH 50: CPT | Performed by: HOSPITALIST

## 2021-12-16 PROCEDURE — 83735 ASSAY OF MAGNESIUM: CPT | Performed by: INTERNAL MEDICINE

## 2021-12-16 RX ORDER — LIDOCAINE 40 MG/G
CREAM TOPICAL
Status: CANCELLED | OUTPATIENT
Start: 2021-12-16

## 2021-12-16 RX ORDER — ACETAMINOPHEN 325 MG/1
650 TABLET ORAL EVERY 4 HOURS PRN
Status: DISCONTINUED | OUTPATIENT
Start: 2021-12-16 | End: 2021-12-18 | Stop reason: HOSPADM

## 2021-12-16 RX ADMIN — I.V. FAT EMULSION 250 ML: 20 EMULSION INTRAVENOUS at 20:40

## 2021-12-16 RX ADMIN — INSULIN ASPART 6 UNITS: 100 INJECTION, SOLUTION INTRAVENOUS; SUBCUTANEOUS at 20:33

## 2021-12-16 RX ADMIN — Medication: at 20:40

## 2021-12-16 RX ADMIN — INSULIN ASPART 6 UNITS: 100 INJECTION, SOLUTION INTRAVENOUS; SUBCUTANEOUS at 00:24

## 2021-12-16 RX ADMIN — INSULIN ASPART 6 UNITS: 100 INJECTION, SOLUTION INTRAVENOUS; SUBCUTANEOUS at 16:07

## 2021-12-16 RX ADMIN — HYDROMORPHONE HYDROCHLORIDE 0.2 MG: 0.2 INJECTION, SOLUTION INTRAMUSCULAR; INTRAVENOUS; SUBCUTANEOUS at 06:26

## 2021-12-16 RX ADMIN — HYDROMORPHONE HYDROCHLORIDE 0.2 MG: 0.2 INJECTION, SOLUTION INTRAMUSCULAR; INTRAVENOUS; SUBCUTANEOUS at 21:01

## 2021-12-16 RX ADMIN — INSULIN ASPART 4 UNITS: 100 INJECTION, SOLUTION INTRAVENOUS; SUBCUTANEOUS at 08:37

## 2021-12-16 RX ADMIN — HYDROMORPHONE HYDROCHLORIDE 0.2 MG: 0.2 INJECTION, SOLUTION INTRAMUSCULAR; INTRAVENOUS; SUBCUTANEOUS at 00:13

## 2021-12-16 RX ADMIN — HYDROMORPHONE HYDROCHLORIDE 0.2 MG: 0.2 INJECTION, SOLUTION INTRAMUSCULAR; INTRAVENOUS; SUBCUTANEOUS at 03:28

## 2021-12-16 RX ADMIN — INSULIN ASPART 4 UNITS: 100 INJECTION, SOLUTION INTRAVENOUS; SUBCUTANEOUS at 03:28

## 2021-12-16 RX ADMIN — INSULIN ASPART 3 UNITS: 100 INJECTION, SOLUTION INTRAVENOUS; SUBCUTANEOUS at 12:10

## 2021-12-16 RX ADMIN — HYDROMORPHONE HYDROCHLORIDE 0.2 MG: 0.2 INJECTION, SOLUTION INTRAMUSCULAR; INTRAVENOUS; SUBCUTANEOUS at 08:34

## 2021-12-16 ASSESSMENT — ACTIVITIES OF DAILY LIVING (ADL)
ADLS_ACUITY_SCORE: 8
ADLS_ACUITY_SCORE: 8
ADLS_ACUITY_SCORE: 6
ADLS_ACUITY_SCORE: 8
ADLS_ACUITY_SCORE: 6
ADLS_ACUITY_SCORE: 8
ADLS_ACUITY_SCORE: 8
ADLS_ACUITY_SCORE: 6
ADLS_ACUITY_SCORE: 8
ADLS_ACUITY_SCORE: 8
ADLS_ACUITY_SCORE: 6
ADLS_ACUITY_SCORE: 8
ADLS_ACUITY_SCORE: 8
ADLS_ACUITY_SCORE: 6
ADLS_ACUITY_SCORE: 8
ADLS_ACUITY_SCORE: 6
ADLS_ACUITY_SCORE: 8

## 2021-12-16 ASSESSMENT — MIFFLIN-ST. JEOR: SCORE: 1055.54

## 2021-12-16 NOTE — PLAN OF CARE
G-tube to gravity, started clear liquid diet. G-tube site tender this am, Dilaudid IV given this am with good pain relief.Portcath with TPN, up ad earl.Patient and  went to [facility]  for TPN teaching this am.Voiding spont, no gas this shift.Glucoses 223/209, covered per sliding scale.

## 2021-12-16 NOTE — PROGRESS NOTES
GASTROENTEROLOGY PROGRESS NOTE    Date of Admission: 12/12/2021  Reason for Admission: 41 year odl female with gastric adenocarcinoma metastasized to liver, ovaries, and peritoneum (dx 07/2021) on treatment with FOLFOX + trastuzaumab + pembrolizumab since 08/25/2021 admitted with recurrent partial SBO. GI consulted for placement of venting PEG tube on 12/15.     ASSESSMENT/RECOMMENDATIONS:    #Malignant SBO s/p PEG tube placement  #Metastatic gastric adenocarcinoma   Patient had PEG tube placement with Dr. Arrieta on 12/15 for management of recurrent malignant SBO and seems to be more comfortable from yesterday. PEG-tube seems well secured in the left upper quadrant of the abdomen without signs of infection at site. Reviewed PEG tube cares with patient and provided instructions for T-tag removal. Recommend full-liquid diet and encourage oral formulations of medications if possible. Instructed patient to monitor location of external bumper and signs of SBO recurrence, to which she expressed understanding.     -Monitor abdominal exam closely  -No anticoagulation for 72 hours post operatively  -Follow full-liquid diet. Encourage oral formulations of medications (including PPI)  -One T-tag (white button) should be removed 2 weeks following procedure, around 12/29/21. Discussed with patient and  instructions for removal: lift the button and cut the underlying blue suture. Often times the buttons fall off prior to the 2 week madie.   -Instructed patient to monitor location of external bumper and worsening of symptoms  -Analgesia/antiemetics per primary team (expect pain to peak POD2-3)  -Patient had PLC appointment today for PEG management and cares  -Discussed with primary team Dr. Howard    GI will sign off    The patient was discussed and plan agreed upon with GI staff, Dr De Luna.    LUCIO Langley-S3  Advanced Endoscopy/Pancreaticobiliary GI Service  [facility]  "Center  _______________________________________________________________      Subjective: Nursing notes and 24hr events reviewed. Patient seen and examined at 1230. Patient reports that she has less abdominal pain with some tenderness still at the site of PEG tube placement, using IV dilaudid. She inquires about dietary restrictions today.     ROS:   4 pt ROS negative unless noted in subjective.     Objective:  Blood pressure 130/79, pulse 67, temperature 97.3  F (36.3  C), temperature source Oral, resp. rate 16, height 1.6 m (5' 3\"), weight 39.9 kg (88 lb), SpO2 99 %.  Gen: Sitting/lying in bed. Cachectic  HEENT: NCAT. Conjunctiva clear. Sclera anicteric   Resp: No audible wheezing, no increased work of breathing  Abd: Soft, mild tenderness to palpation at site of tube placement, ND, no guarding or rebound.   PEG tube and T-tag in place in LUQ with external bumper at 4cm. Yellow liquid drainage in gravity bag  Msk: no gross deformity  Skin:  Well-healed scar at midline of abdomen, no jaundice  Ext: warm, well perfused   Neuro: grossly normal  Mental status/Psych: A&O. Asks/answers questions appropriately     Date 12/16/21 0700 - 12/17/21 0659   Shift 1267-2010 5237-1595 5652-4732 24 Hour Total   INTAKE   P.O. 240   240   I.V. 10   10   Shift Total(mL/kg) 250(6.26)   250(6.26)   OUTPUT   Emesis/NG output 50   50   Shift Total(mL/kg) 50(1.25)   50(1.25)   Weight (kg) 39.92 39.92 39.92 39.92         PROCEDURES:  12/15: NG tube removal and PEG tube placement (Dr. Arrieta)     LABS:  [facility]  Recent Labs   Lab 12/16/21  1157 12/16/21  0834 12/16/21  0802 12/16/21  0327 12/15/21  0931 12/15/21  0728 12/14/21  0721 12/14/21  0707 12/13/21  0814 12/13/21 0702 12/12/21 2028   NA  --   --  134  --   --  131*  --  137  --   --  137   POTASSIUM  --   --  3.8  --   --  4.6  --  3.8  --  3.8 3.8   CHLORIDE  --   --  98  --   --  98  --  103  --   --  102   JORGE L  --   --  8.5  --   --  8.6  --  8.6  --   --  9.2   CO2  --   --  26  " --   --  21  --  24  --   --  25   BUN  --   --  13  --   --  13  --  10  --   --  11   CR  --   --  0.50*  --   --  0.53  --  0.58  --   --  0.52   * 223* 243* 229*   < > 418*   < > 192*   < >  --  146*    < > = values in this interval not displayed.     CBC  Recent Labs   Lab 12/16/21  0802 12/15/21  0728 12/14/21  0707 12/12/21  2028   WBC 3.1* 3.3* 3.3* 7.3   RBC 3.21* 3.40* 3.18* 3.50*   HGB 9.6* 10.1* 9.3* 10.2*   HCT 28.9* 31.4* 29.8* 30.9*   MCV 90 92 94 88   MCH 29.9 29.7 29.2 29.1   MCHC 33.2 32.2 31.2* 33.0   RDW 14.6 14.6 14.8 15.1*    283 227 250     INR  Recent Labs   Lab 12/15/21  0728   INR 1.08     LFTs  Recent Labs   Lab 12/15/21  0728 12/12/21  2028   ALKPHOS 72 76   AST 15 23   ALT 24 32   BILITOTAL 0.8 0.6   PROTTOTAL 7.0 7.5   ALBUMIN 3.0* 3.3*      PANCNo lab results found in last 7 days.

## 2021-12-16 NOTE — PROGRESS NOTES
Hematology / Oncology  Daily Progress Note   Date of Service: 12/16/2021  Patient: Shira Joy  MRN: [id number]  Admission Date: 12/12/2021  Hospital Day # 3  Cancer Diagnosis: Metastatic gastric adenocarcinoma  Primary Outpatient Oncologist: Dr. Peterson ([facility]), Dr. Schmidt ([facility])  Current Treatment Plan: FOLFOX + trastuzumab + pembrolizumab    Recommendations:   - No further recommendations  - Pt can follow up with outpatient oncologist for further discussions regarding treatment     Assessment & Plan:   Ms. Shira Joy is a 42yo with metastatic gastric adenocarcinoma with metastases to the liver, ovaries, and peritoneum treated with FOLFOX + trastuzumab + pembrolizumab and c/b autoimmune diabetes and recent SBO who presented recurrent abdominal pain and new emesis found to have recurrent SBO.     Diagnoses:  # Metastatic gastric adenocarcinoma  # Malignant SBO  # Autoimmune DM     Patient now w/ PEG tube in place and plan to continue tube feeds with goal rate set and ability to cycle to allow for time while disconnected. Patient aware of PEG management and received PEG cares training this AM. She is closely monitoring symptoms and understands to return if concern for SBO unrelieved w/ suction.    Oncologic History:  Oncologists  [facility]: Dr. Peterson  [facility]: Dr. Schmidt     Diagnosis: Metastatic gastric adenocarcinoma     Treatment history (copied & updated from last [facility] document in CareEverywhere on 10/14/21)     7/24/2021 Initial Diagnosis   She presented with few weeks of abdominal pain, which she attributed to her known fibroids. On 07/24/2021, she developed acute onset of severe abdominal pain prompting emergency department evaluation. CT of the abdomen revealed free air concerning for viscus perforation. She underwent exploratory laparotomy which revealed a perforated gastric ulcer. This was repaired with a Frank patch. Additionally, she was noted to have omental and peritoneal nodules  which were biopsied. Intraoperative EGD revealed a large gastric mass which was also biopsied.    7/24/2021 Biopsy/Pathology   Gastric biopsy: Poorly differentiated adenocarcinoma with focal Signet ring formation. Immunohistochemical stain for Helicobacter pylori is negative.     Additional biopsies from the mesentery, gastrocolic ligament, and a left pelvic sidewall nodule were all consistent with metastatic poorly differentiated adenocarcinoma.    HER2 FISH was positive for HER2 amplification (HER2/D17Z! Ratio: 2.75)    PD-L1 and MMR testing were pending at the time of initial consultation.     9/3/2021 Biopsy/Pathology   PDL1, mismatch repair, and HER2 IHC on the initial 7/24 biopsy were delayed due to inadvertent cancellation of the tests initially.     Results were ultimately reported 9/2021:     PD-L1 CPS 10 (PD-L1 clone 22C3: tumor cells 0%, TILs 10%)    Mismatch repair intact (IHC)    HER2 1+ (IHC)    9/13/2021 Genetic Testing and Tumor Genotyping   Strong family history esophageal, gastric, colon, and other cancer. Clinical genetics consult obtained, and Common Hereditary Cancers Panel (uTaP) sent      Started on FOLFOX + trastuzumab + pembrolizumab in 08/2021. Plan at 10/14/21 follow-up was to continue this for an additional two months given difficulty in interpreting her restaging scans (given lack of ideal timing for her initial scans).            Patient was seen and plan of care was discussed with attending physician Lyric Angulo.     Thank you for the opportunity to partake in this patients plan of care. Please do not hesitate to page with questions. We will continue to follow.       Augustina Arroyo MD  Internal Medicine PGY 1  Pager: [phone]    ___________________________________________________________________    Subjective & Interval History:    Patient seen by GI yesterday and received her PEG tube. She continued to run at her baseline for TPN. Complaint only of minimal  "pain at site of PEG tube insertion. Otherwise, inquired about how she might be able to self-advance her diet.        Complete and Comprehensive review of systems review and negative other than noted here or in the HPI.    Physical Exam:    Blood pressure 130/79, pulse 67, temperature 97.3  F (36.3  C), temperature source Oral, resp. rate 16, height 1.6 m (5' 3\"), weight 39.9 kg (88 lb), SpO2 99 %.    Constitutional: Awake and conversational. Non- toxic appearing. No acute distress. Well developed, very thin, hydrated, Appears stated age.   HEENT: Normocephalic, atraumatic. Sclerae anicteric. EOM intact.   Lymph: Neck supple. No significant adenopathy noted.   Respiratory: Breathing comfortable with no increased work on room air. Good air exchange. No signs of respiratory distress or accessory muscle use. Cardiovascular: Regular rate and rhythm. Normal S1 and S2. No murmurs, rubs, or gallops. No peripheral edema.    GI: Abdomen is soft, minimal tenderness, and PEG in place.  Skin: Skin is clean, dry, intact. No jaundice appreciated.   Musculoskeletal/ Extremities: No redness or swelling of the joints appreciated.  Nailbeds pink and without cyanosis or clubbing.   Neurologic: Alert and oriented. Speech normal. Grossly nonfocal. Memory and thought process preserved.   Neuropsychiatric: Calm, affect congruent to situation. Appropriate mood and affect. Good judgment and insight. No visual/auditory hallucinations.       Labs & Studies: I personally reviewed the following studies:  ROUTINE LABS (Last four results):  [facility]  Recent Labs   Lab 12/16/21  0834 12/16/21  0802 12/16/21  0327 12/16/21  0021 12/15/21  0931 12/15/21  0728 12/14/21  0721 12/14/21  0707 12/13/21  0814 12/13/21  0702 12/12/21 2028   NA  --  134  --   --   --  131*  --  137  --   --  137   POTASSIUM  --  3.8  --   --   --  4.6  --  3.8  --  3.8 3.8   CHLORIDE  --  98  --   --   --  98  --  103  --   --  102   CO2  --  26  --   --   --  21  --  24  --   " --  25   ANIONGAP  --  10  --   --   --  12  --  10  --   --  10   * 243* 229* 278*   < > 418*   < > 192*   < >  --  146*   BUN  --  13  --   --   --  13  --  10  --   --  11   CR  --  0.50*  --   --   --  0.53  --  0.58  --   --  0.52   GFRESTIMATED  --  >90  --   --   --  >90  --  >90  --   --  >90   JORGE L  --  8.5  --   --   --  8.6  --  8.6  --   --  9.2   MAG  --  2.0  --   --   --  2.2  --  2.1  --   --   --    PHOS  --  3.6  --   --   --  3.6  --  3.3  --   --   --    PROTTOTAL  --   --   --   --   --  7.0  --   --   --   --  7.5   ALBUMIN  --   --   --   --   --  3.0*  --   --   --   --  3.3*   BILITOTAL  --   --   --   --   --  0.8  --   --   --   --  0.6   ALKPHOS  --   --   --   --   --  72  --   --   --   --  76   AST  --   --   --   --   --  15  --   --   --   --  23   ALT  --   --   --   --   --  24  --   --   --   --  32    < > = values in this interval not displayed.     CBC  Recent Labs   Lab 12/16/21  0802 12/15/21  0728 12/14/21 0707 12/12/21 2028   WBC 3.1* 3.3* 3.3* 7.3   RBC 3.21* 3.40* 3.18* 3.50*   HGB 9.6* 10.1* 9.3* 10.2*   HCT 28.9* 31.4* 29.8* 30.9*   MCV 90 92 94 88   MCH 29.9 29.7 29.2 29.1   MCHC 33.2 32.2 31.2* 33.0   RDW 14.6 14.6 14.8 15.1*    283 227 250     INR  Recent Labs   Lab 12/15/21  0728   INR 1.08       Medications list for reference:  Current Facility-Administered Medications   Medication     acetaminophen (TYLENOL) tablet 650 mg     benzocaine 20% (HURRICAINE/TOPEX) 20 % spray 0.5-1 mL     dextrose 10% infusion     glucose gel 15-30 g    Or     dextrose 50 % injection 25-50 mL    Or     glucagon injection 1 mg     HYDROmorphone (DILAUDID) injection 0.2-0.4 mg     [Held by provider] insulin aspart (NovoLOG) injection (RAPID ACTING)     [Held by provider] insulin aspart (NovoLOG) injection (RAPID ACTING)     insulin aspart (NovoLOG) injection (RAPID ACTING)     insulin glargine (LANTUS PEN) injection 6 Units     lidocaine (LMX4) cream     lidocaine  (XYLOCAINE) 4 % solution 5 mL     lidocaine 1 % 0.1-1 mL     lipids (INTRALIPID) 20 % infusion 250 mL     LORazepam (ATIVAN) injection 0.5 mg     melatonin tablet 1 mg     naloxone (NARCAN) injection 0.2 mg    Or     naloxone (NARCAN) injection 0.4 mg    Or     naloxone (NARCAN) injection 0.2 mg    Or     naloxone (NARCAN) injection 0.4 mg     ondansetron (ZOFRAN-ODT) ODT tab 4 mg    Or     ondansetron (ZOFRAN) injection 4 mg     oxymetazoline (AFRIN) 0.05 % spray 2 spray     parenteral nutrition - ADULT compounded formula     parenteral nutrition - ADULT compounded formula     prochlorperazine (COMPAZINE) injection 10 mg    Or     prochlorperazine (COMPAZINE) tablet 10 mg    Or     prochlorperazine (COMPAZINE) suppository 25 mg     sodium chloride (PF) 0.9% PF flush 3 mL     sodium chloride (PF) 0.9% PF flush 3 mL

## 2021-12-16 NOTE — PLAN OF CARE
"/79 (BP Location: Right arm)   Pulse 66   Temp 98.2  F (36.8  C) (Axillary)   Resp 16   Ht 1.6 m (5' 3\")   Wt 39.9 kg (88 lb)   SpO2 97%   BMI 15.59 kg/m       Patient arrived back to  this evening. A&Ox4, VSS on 2L Capno. Refused Capno prior to bed, O2 saturation 100% RA. No complaints of pain, slight discomfort when ambulating. SBA, tolerated well. Calling appropriately for assistance to bathroom. PEG tube noted to have scant serous drainage around dressing and draining from tube. R CW PORT infusing TPN and Lipids. Mother present at bedside earlier this evening. Continue POC.  "

## 2021-12-16 NOTE — PROGRESS NOTES
IP Diabetes Management  Daily Note           Assessment and Plan:   HPI: Shira Joy is a 41 year old female admitted on 12/12/2021. She has PMHx of metastatic gastric adenocarcinoma with liver and ovarian metastases and peritoneal carcinomatosis (diagnosed 7/2021, on treatment with FOLFOX plus trastuzumab and keydtruda since 8/25/21) and recent recurrent SBO (most recently admitted 12/6-12/9) presented with abdominal pain and vomiting found to have another episode of SBO.    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    2) SBO, NG placed  3) gastric adenocarcinoa with mets to liver, ovaries, peritoneal carcinamatosis      Plan:    -Lantus 6 units every AM   -hold CHO coverage---NPO   -novolog high resistance sliding scale insulin Q 4 hours   -increase to 24 units from 12 units of regular insulin to TPN to start tonight (0.14units/g dextrose) for an increase to 170g dextrose from 120g dextrose in continuous TPN and BG in 200s   -BG monitoring Q 4 hours   -hypoglycemia protocol   -needs education about TPN and insulin in TPN    Outpatient follow up: Would like [facility], Santa Mohan PA-C, order placed 12/16/21  Plan discussed with patient, patient's , bedside RN, RD, and primary team through this note.       Interval History and Assessment: interval glucose trend reviewed:         Lantus givne late this AM as patient at [facility] for TPN education.  CDE to see today as well.      BG 200s, received 4 mg dexamethasone in OR yesterday at 1527.  Likely needs more insulin in TPN overall, but steroids affecting BG as well.  Per patient, she was doing insulin injections PTA.  May discharge in 1-2 days, per patient report.  She would [facility].  Dextrose increasing to 170g in TPN tonight.    PEG placed yesterday.  Currently NPO.  If any liquid intake, she would vent  tube, so no absorption and no carb coverage needed.         Current nutritional intake and type: Orders Placed This Encounter      NPO for Medical/Clinical Reasons Except for: Meds      Planned Procedures/surgeries: none  Steroid planning: dex 4mg in OR 12/16/21  D5W-containing solutions/medications: TPN continuous to provide 170g dextrose     PTA Diabetes Regimen:   Lantus 12 units once daily, novolog 1:15g CHO with meals, and 1:50>150 sliding scale insulin.      Discharge Planning: TBD           Diabetes History:   Type of Diabetes: autoimmune DM  Lab Results   Component Value Date    A1C 7.1 12/07/2021              Review of Systems:     The Review of Systems is negative other than noted in the Interval History.           Medications:     Current Facility-Administered Medications   Medication     acetaminophen (TYLENOL) tablet 650 mg     benzocaine 20% (HURRICAINE/TOPEX) 20 % spray 0.5-1 mL     dextrose 10% infusion     glucose gel 15-30 g    Or     dextrose 50 % injection 25-50 mL    Or     glucagon injection 1 mg     HYDROmorphone (DILAUDID) injection 0.2-0.4 mg     insulin aspart (NovoLOG) injection (RAPID ACTING)     insulin aspart (NovoLOG) injection (RAPID ACTING)     insulin aspart (NovoLOG) injection (RAPID ACTING)     insulin glargine (LANTUS PEN) injection 6 Units     lidocaine (LMX4) cream     lidocaine (XYLOCAINE) 4 % solution 5 mL     lidocaine 1 % 0.1-1 mL     lipids (INTRALIPID) 20 % infusion 250 mL     LORazepam (ATIVAN) injection 0.5 mg     melatonin tablet 1 mg     naloxone (NARCAN) injection 0.2 mg    Or     naloxone (NARCAN) injection 0.4 mg    Or     naloxone (NARCAN) injection 0.2 mg    Or     naloxone (NARCAN) injection 0.4 mg     ondansetron (ZOFRAN-ODT) ODT tab 4 mg    Or     ondansetron (ZOFRAN) injection 4 mg     oxymetazoline (AFRIN) 0.05 % spray 2 spray     parenteral nutrition - ADULT compounded formula     prochlorperazine (COMPAZINE) injection 10 mg    Or     prochlorperazine  "(COMPAZINE) tablet 10 mg    Or     prochlorperazine (COMPAZINE) suppository 25 mg     sodium chloride (PF) 0.9% PF flush 3 mL     sodium chloride (PF) 0.9% PF flush 3 mL            Physical Exam:    /84 (BP Location: Right arm)   Pulse 59   Temp 99.1  F (37.3  C) (Temporal)   Resp 16   Ht 1.6 m (5' 3\")   Wt 39.9 kg (88 lb)   SpO2 99%   BMI 15.59 kg/m    General: pleasant, in no distress.   HEENT: normocephalic, atraumatic. Oral mucous membranes moist. NGT in place.  Lungs: unlabored respiration, no cough  ABD: rounded, nondistended  Skin: warm and dry, no obvious lesions  MSK:  moves all extremities  Lymp:  no LE edema   Mental status:  alert, oriented to self, place, time  Psych:  affect, calm and appropriate interaction             Data:     Recent Labs   Lab 12/16/21  0327 12/16/21  0021 12/15/21  1939 12/15/21  1647 12/15/21  1420 12/15/21  1142   * 278* 220* 244* 223* 313*     Lab Results   Component Value Date    WBC 3.3 (L) 12/15/2021    WBC 3.3 (L) 12/14/2021    WBC 7.3 12/12/2021    HGB 10.1 (L) 12/15/2021    HGB 9.3 (L) 12/14/2021    HGB 10.2 (L) 12/12/2021    HCT 31.4 (L) 12/15/2021    HCT 29.8 (L) 12/14/2021    HCT 30.9 (L) 12/12/2021    MCV 92 12/15/2021    MCV 94 12/14/2021    MCV 88 12/12/2021     12/15/2021     12/14/2021     12/12/2021     Lab Results   Component Value Date     (L) 12/15/2021     12/14/2021     12/12/2021    POTASSIUM 4.6 12/15/2021    POTASSIUM 3.8 12/14/2021    POTASSIUM 3.8 12/13/2021    CHLORIDE 98 12/15/2021    CHLORIDE 103 12/14/2021    CHLORIDE 102 12/12/2021    CO2 21 12/15/2021    CO2 24 12/14/2021    CO2 25 12/12/2021     (H) 12/16/2021     (H) 12/16/2021     (H) 12/15/2021     Lab Results   Component Value Date    BUN 13 12/15/2021    BUN 10 12/14/2021    BUN 11 12/12/2021     No results found for: TSH  Lab Results   Component Value Date    AST 15 12/15/2021    AST 23 12/12/2021    AST 28 " 12/06/2021    ALT 24 12/15/2021    ALT 32 12/12/2021    ALT 65 (H) 12/06/2021    ALKPHOS 72 12/15/2021    ALKPHOS 76 12/12/2021    ALKPHOS 84 12/06/2021           35 minutes spent on the date of the encounter doing chart review, history and exam, documentation and further activities per the note      Over 50% of my time on the unit was spent counseling the patient and/or coordinating care regarding acute hyperglycemia management.  See note for details.    To contact Endocrine Diabetes service:   From 8AM-4PM: page inpatient diabetes provider that is following the patient  For questions or updates from 4PM-8AM: page the diabetes job code for on call fellow: [phone]    DEBBI Cleaning, CNP  Inpatient Diabetes Management Service  Pager [phone]

## 2021-12-16 NOTE — CONSULTS
Met with Shira and her spouse Bandar for Port, TPN, adn G tube education. Both were attentive and asked appropriate questions. They were able to teach back and demonstrate how to clean around her G tube, flush the tube, and attach to drainage bag. Next we went over how to flush her port and how to set up her TPN using the Cadd pump. They feel comfortable being able to manage this at home.      Literature given: Handwashing and Skin Care, Caring for Your Tube at Home.  Literature given: Caring for Your Port at Home, Flushing the Line with Heparin, Saline or Citrate. How to Set up and Infuse Your TPN.

## 2021-12-16 NOTE — PROGRESS NOTES
CLINICAL NUTRITION SERVICES - BRIEF NOTE  *See RD note on 12/14 for nutrition assessment note details/recs    Nutrition Prescription     RECOMMENDATIONS FOR MDs/PROVIDERS TO ORDER:  Please alert RD or pharmacist if pt requires adjustments to PN fluid volume    Recommendations already ordered by Registered Dietitian (RD):  Increase dextrose in TPN to 170 g, discussed with Endo who is agreeable    Future/Additional Recommendations:  Continue to advance as able (pending lytes/BGs) to 50-70 g/day to goal dextrose 230 g     Labs: K+, Mg++, Phos WNL.  BGs 200s, but much better than uesterday (300s-400s).      INTERVENTIONS  Implementation  Collaboration with other providers and Parenteral Nutrition/IV Fluids - Endo ok with advanacing dextrose today, they will adjust insulin regimen accordingly. Discussed with pharmacist.       Monitoring/Evaluation  Progress toward goals will be monitored and evaluated per protocol.     Margarette Potter, LIZZY, LD  Weekday Pager: [phone]  Weekday Units covered: 7C (all beds) and 5A (beds 5201 through 5211-2)  Weekend/Holiday RD Pager: [phone]

## 2021-12-16 NOTE — CONSULTS
Discharge Pharmacy Test Claim    Glucagon, baqsimi, and gvoke are all covered by patient's [employer] prescription insurance with $0 copay.      Rhoda Cooper  [facility] Pharmacy Liaison  Ph: [phone] Pager: [phone]

## 2021-12-16 NOTE — CONSULTS
DIABETES CARE  Consulted by Provider for Diabetes Education: discharge with possible TPN and insulin in bag + Novolog SSI    41 year old female with type 1 diabetes, new diagnosis following chemo treatment. Patient was admitted for abdominal pain and vomiting w/ SBO  Related Co-morbidities include: metastatic gastric adenocarcinoma with liver and ovarian metastases and peritoneal carcinomatosis (diagnosed 7/2021, on treatment with FOLFOX plus trastuzumab and keydtruda) and recent recurrent SBO      PCP: Kae Parmar  Social: , from home.     Nutrition & Diabetes History:   Recently diagnosed with new onset diabetes presumed Autoimmune Type 1 following chemotherapy treatment for metastatic gastric adenocarcinoma with liver and ovarian metastases and peritoneal carcinomatosis (diagnosed 7/2021, on treatment with FOLFOX plus trastuzumab and keytruda since 8/25/21).    Pt has received diabetes education. Was doing basal/nutritional/correction pta. If low BG was using lifesavers. Wears a Ronn 2.      Meds for BG Management PTA:  -Lantus 12u AM  -Humalog 1:15 with meals + snacks  -Humalog sliding scale with meals  -NPH 5u with steroid dose prior to chemo    Current Inpatient Meds for BG Management:  -Novolog 1u per 10g cho with meals + snacks  -Novolog high insulin resistant scale every 4 hours   -Lantus 6u AM    Labs:  Hemoglobin A1C: 7.1% (12/7/21)       Hgb: 9.7 SCr: 0.50 GFR: >90  BGs:   Results for ELOISE SCANLON (MRN [id number]) as of 12/16/2021 09:27   Ref. Range 12/15/2021 11:42 12/15/2021 14:20 12/15/2021 15:06 12/15/2021 16:47 12/15/2021 18:49 12/15/2021 19:39 12/16/2021 00:21 12/16/2021 03:27 12/16/2021 08:02 12/16/2021 08:34   GLUCOSE BY METER POCT Latest Ref Range: 70 - 99 mg/dL 313 (H) 223 (H)  244 (H)  220 (H) 278 (H) 229 (H)  223 (H)       Diet Order:  NPO  PN (per RD note 12/14/21): 1080 mL (45 mL/hr) 90 g dex (GIR= 1.5 mg/kg/min), 75 g AA (1.8 g/kg) and 250 mL 20% intralipid 5x per week  "= 963 kcal (23 kcal/kg) and 37% kcal from fat, advance dextrose by 50-70 g every 1-2 days to goal of 230g (GIR= 3.86 mg/kg/min). Current bag with 120g dextrose.      Weight: 39.9kg BMI: Body mass index is 15.59 kg/m .      DM EDUCATION/COUNSELING:  Barriers to Learning and/or DM Self-Management: bowel obstruction    Current Education and/or visit with Patient and/or caregiver(s):  Met with pt and . Pt just got back from [facility] regarding PN for home. Reports this went well. Pt wondering who will be adjusting her insulin at home. Explained that home infusion will be monitoring labs and adjusting insulin in PN bag. She will be adjusting Novolog per correction scale as she was doing pta and also will be on Lantus.   She is wondering about coverage for PO intake if able to do liquids. We discussed that given her PO would be limited and likely not absorbed if mostly coming out of G-tube would likely not be covering to minimize risk of low BG.    Reviewed hypoglycemia with NPO status. Discussed if BG<70 could try using glucose gel under tongue first, if this does not bring up BG would need to administer Baqsimi (this is covered with insurance). We reviewed how to use this. Discussed who to call/when to call.     Reviewed goal BG with TPN vs PO.     She is planning to get established with endo team after discharge.     Pt and  verbalized understanding.    (See also \"Diabetic Ed Flowsheet\"  Or Education tab-diabetes for any education topic details.)    ASSESSMENT:  Pt with hx of recent type 1 diabetes diagnosis secondary to CA treatments. Received TPN training from [facility]. Had a few other questions that we discussed and went over hypo treatment given NPO w/ SBO. Pt did well with education.     RECOMMENDATIONS:  -Endo following or insulin adjustments/plan.  -Will need glucose gel and Baqsimi ordered at discharge.     For inpatient diabetes management guidelines refer to \"Guidelines for Subcutaneous Insulin Dosing\" " found in the DIAB Subcutaneous Insulin Management Adult order set.     DISCHARGE NEEDS:   -Baqsimi (glucagon nasal spray)  -Glucose gel    Thank you,   Kendra Cardona RD, LD, [facility]  Diabetes Educator  Pager: [phone]

## 2021-12-16 NOTE — PLAN OF CARE
Admitted/transferred from:   2 RN full   skin assessment completed by Nelsy Lara RN and Marilyn CARDOZO RN.  Skin assessment finding: issues found blanchable reddness noted on right/medial tip of nose, peg tube, port, piv, and small pink/red noted sores on sacrum/coccyx.   Interventions/actions: other skin checks q shift/prn, turn/repo, hob < 30 degrees, encourage protein intake and maintain hydration.     Will continue to monitor.

## 2021-12-16 NOTE — CONSULTS
[facility] Nurse Inpatient Pressure Injury Assessment   Reason for consultation: Evaluate and treat R nare      ASSESSMENT  Pressure Injury: on R nare , hospital acquired ,   This is a Medical Device Related Pressure Injury (MDRPI) due to NG  Pressure Injury is Stage 1   Contributing factor of the pressure injury: pressure  Status: initial assessment, tube already removed        TREATMENT PLAN  R nare wound: BID cleanse with saline and dry, then apply a thin layer of vaseline over red area. Leave MENDEL    Orders Written  WO Nurse follow-up plan:weekly  Nursing to notify the Provider(s) and re-consult the [facility] Nurse if wound(s) deteriorates or new skin concern.    Patient History  According to provider note(s):  Shira Joy is a 41 year old female admitted on 12/12/2021. She has PMHx of metastatic gastric adenocarcinoma with liver and ovarian metastases and peritoneal carcinomatosis (diagnosed 7/2021, on treatment with FOLFOX plus trastuzumab and keydtruda since 8/25/21) and recent recurrent SBO (most recently admitted 12/6-12/9) presented with abdominal pain and vomiting found to have another episode of SBO.    Objective Data      Current Diet/ Nutrition:  Orders Placed This Encounter      Full Liquid Diet      Output:   I/O last 3 completed shifts:  In: 1380.32 [I.V.:500]  Out: 230 [Urine:225; Blood:5]    Risk Assessment:   Sensory Perception: 4-->no impairment  Moisture: 4-->rarely moist  Activity: 3-->walks occasionally  Mobility: 3-->slightly limited  Nutrition: 3-->adequate  Friction and Shear: 3-->no apparent problem  Kleber Score: 20      Labs: Recent Labs   Lab 12/16/21  0802 12/15/21  0728   ALBUMIN  --  3.0*   PREALB  --  10*   HGB 9.6* 10.1*   INR  --  1.08   WBC 3.1* 3.3*       Physical Exam  Skin inspection: focused nare          Wound Location:  R nare   Date of last Photo 12/16  Wound History: had NGT in place, just removed, PI noted upon removal   Measurements (length x width x depth, in cm) 0.2 cm x 0.2  cm  x  0 cm   Wound Base:  100 % non-blanchable, erythema and epidermis  Tunneling N/A  Undermining N/A  Palpation of the wound bed: normal   Periwound skin: erythema- blanchable  Color: pink  Temperature: normal   Drainage:, none  Description of drainage: none  Odor: none  Pain: mild, tender    Interventions  Current support surface: Standard  Atmos Air mattress  Current off-loading measures: Pillows  Repositioning aid: Pillows  Visual inspection of wound(s) completed   Wound Care: was done per plan of care.  Supplies: floor stock  Educated provided: plan of care  Education provided to: patient   Discussed importance of:off-loading pressure to wound  Discussed plan of care with Patient    Jessica Reynoso RN, CWOCN

## 2021-12-16 NOTE — PROGRESS NOTES
[facility]    Medicine Progress Note - Hospitalist Service, Gold 8       Date of Admission:  12/12/2021    Assessment & Plan           Shira Joy is a 41 year old female admitted on 12/12/2021. She has PMHx of metastatic gastric adenocarcinoma with liver and ovarian metastases and peritoneal carcinomatosis (diagnosed 7/2021, on treatment with FOLFOX plus trastuzumab and keydtruda since 8/25/21) and recent recurrent SBO (most recently admitted 12/6-12/9) presented with abdominal pain and vomiting found to have another episode of SBO.     Recurrent Small Bowel Obstruction  Gastric Adenocarcinoma with metastases to liver and ovaries, peritoneal carcinomatosis  - Started on FOLFOX + trastuzumab + pembrolizumab in 08/2021. Plan at 10/14/21 follow-up was to continue this for an additional two months given difficulty in interpreting her restaging scans (given lack of ideal timing for her initial scans).   - She was doing well for the past 3 days since her 12/9 discharge for SBO until around 1pm on the day of admission, where CT showed recurrent SBO  * venting G-tube placed by GI 12/15 ---> teaching done 12/16 w/ bag to gravity, home suction PRN for emesis and abdominal pain, will trial CLD --> FLD pending GI approval   - TPN started 12/15  - home PPI --> given history of perforated gastric ulcer and stomach tumor involvement she needs this thus will give as PO solution daily pantoprazole to take when G-tube is clamped for oral intake, ordered  *Pain: dilaudid IV PRN, tylenol PRN    Leukopenia & anemia  - suspect 2/2 malnutrition and BM suppression    DM1  Thought to be autoimmune DM1 given concurrent pembrolizumab treatment and low C-Peptide , slender body habitus. A/W autoimmune related hypothalamic, adrenal, thyroid, diabetes.  Normal adrenal, thyroid labs on last admission at [facility]. During her recent admission, endocrine was consulted with recommendation to discharge  patient on 12 units of Lantus daily, 1 unit of short acting insulin for every 15 g of carbohydrates with meals and snacks, and continuation of her home sliding scale.    - appreciate endo management, titrating insulin w/ TPN    Malnutrition:    - Level of malnutrition: Severe   - Based on: moderate/severe subcutaneous fat loss, moderate/severe muscle loss       Diet: parenteral nutrition - ADULT compounded formula  parenteral nutrition - ADULT compounded formula  Clear Liquid Diet    DVT Prophylaxis: Enoxaparin (Lovenox) SQ  Magana Catheter: Not present  Central Lines: None  Code Status: Full Code      Disposition Plan   Expected Discharge: 12/18/2021     Anticipated discharge location:  Awaiting care coordination huddle  Delays:            The patient's care was discussed with the Bedside Nurse, Patient and oncology Consultant.and IR consultant and GI consultant.    Tony Howard MD  Hospitalist Service, [address]  Securely message with the Vocera Web Console (learn more here)  Text page via [employer] Paging/Directory    Please see sign in/sign out for up to date coverage information    Clinically Significant Risk Factors Present on Admission           # Severe Malnutrition, POA: based on Weight loss;Reduced intake;Subcutaneous fat loss;Muscle loss (12/14/21 1100)  ______________________________________________________________________    Interval History    Bandar w/ pt at bedside, just finished teaching, its a lot but they feel they will be able to handle it. Some britni Gtube tenderness well controlled w/ dilaudid. Minimal gas, no BM since admission. No nause or emesis    4 point ROS otherwise negative.     Data reviewed today: I reviewed all medications, new labs and imaging results over the last 24 hours. I personally reviewed no images or EKG's today.    Physical Exam   Vital Signs: Temp: 97.3  F (36.3  C) Temp src: Oral BP: 130/79 Pulse: 67   Resp: 16  SpO2: 99 % O2 Device: None (Room air) Oxygen Delivery: 2 LPM  Weight: 88 lbs 0 oz    Physical Exam   Constitutional: thin NAD  HEENT: EOMI   Neck: Symmetric  Cardiovascular: regular without murmurs or gallops  Pulmonary/Chest: CTAB. No respiratory distress.   GI: Soft, mild tender near gtube which is c/d/i   Musculoskeletal:  No lower extremity edema   Skin: Skin is warm and dry. Port accessed  Neurological: Alert and oriented   Psychiatric:  euthymic      Data   Recent Labs   Lab 12/16/21  1157 12/16/21  0834 12/16/21  0802 12/15/21  0931 12/15/21  0728 12/14/21  0721 12/14/21  0707 12/13/21  0702 12/12/21 2028   WBC  --   --  3.1*  --  3.3*  --  3.3*  --  7.3   HGB  --   --  9.6*  --  10.1*  --  9.3*  --  10.2*   MCV  --   --  90  --  92  --  94  --  88   PLT  --   --  293  --  283  --  227  --  250   INR  --   --   --   --  1.08  --   --   --   --    NA  --   --  134  --  131*  --  137  --  137   POTASSIUM  --   --  3.8  --  4.6  --  3.8   < > 3.8   CHLORIDE  --   --  98  --  98  --  103  --  102   CO2  --   --  26  --  21  --  24  --  25   BUN  --   --  13  --  13  --  10  --  11   CR  --   --  0.50*  --  0.53  --  0.58  --  0.52   ANIONGAP  --   --  10  --  12  --  10  --  10   JORGE L  --   --  8.5  --  8.6  --  8.6  --  9.2   * 223* 243*   < > 418*   < > 192*   < > 146*   ALBUMIN  --   --   --   --  3.0*  --   --   --  3.3*   PROTTOTAL  --   --   --   --  7.0  --   --   --  7.5   BILITOTAL  --   --   --   --  0.8  --   --   --  0.6   ALKPHOS  --   --   --   --  72  --   --   --  76   ALT  --   --   --   --  24  --   --   --  32   AST  --   --   --   --  15  --   --   --  23    < > = values in this interval not displayed.      [Normal Growth] : growth [Normal Development] : development [None] : No known medical problems [No Elimination Concerns] : elimination [No Feeding Concerns] : feeding [No Skin Concerns] : skin [Normal Sleep Pattern] : sleep [No Medications] : ~He/She~ is not on any medications [Parent/Guardian] : parent/guardian [] : The components of the vaccine(s) to be administered today are listed in the plan of care. The disease(s) for which the vaccine(s) are intended to prevent and the risks have been discussed with the caretaker.  The risks are also included in the appropriate vaccination information statements which have been provided to the patient's caregiver.  The caregiver has given consent to vaccinate. [FreeTextEntry1] : Continue balanced diet with all food groups. Brush teeth twice a day with toothbrush. Recommend visit to dentist. As per car seat 's guidelines, use forward-facing car seat in back seat of car. Switch to booster seat when child reaches highest weight/height for seat. Child needs to ride in a belt-positioning booster seat until  4 feet 9 inches has been reached and are between 8 and 12 years of age. Put toddler to sleep in own bed. Help toddler to maintain consistent daily routines and sleep schedule. Pre-K discussed. Ensure home is safe. Use consistent, positive discipline. Read aloud to toddler. Limit screen time to no more than 2 hours per day.\par

## 2021-12-17 ENCOUNTER — HOME INFUSION (PRE-WILLOW HOME INFUSION) (OUTPATIENT)
Dept: PHARMACY | Facility: CLINIC | Age: 41
End: 2021-12-17
Payer: COMMERCIAL

## 2021-12-17 LAB
ANION GAP SERPL CALCULATED.3IONS-SCNC: 11 MMOL/L (ref 3–14)
BASOPHILS # BLD AUTO: 0 10E3/UL (ref 0–0.2)
BASOPHILS NFR BLD AUTO: 1 %
BUN SERPL-MCNC: 14 MG/DL (ref 7–30)
CALCIUM SERPL-MCNC: 8.6 MG/DL (ref 8.5–10.1)
CHLORIDE BLD-SCNC: 100 MMOL/L (ref 94–109)
CO2 SERPL-SCNC: 25 MMOL/L (ref 20–32)
CREAT SERPL-MCNC: 0.48 MG/DL (ref 0.52–1.04)
EOSINOPHIL # BLD AUTO: 0.2 10E3/UL (ref 0–0.7)
EOSINOPHIL NFR BLD AUTO: 6 %
ERYTHROCYTE [DISTWIDTH] IN BLOOD BY AUTOMATED COUNT: 14.6 % (ref 10–15)
GFR SERPL CREATININE-BSD FRML MDRD: >90 ML/MIN/1.73M2
GLUCOSE BLD-MCNC: 192 MG/DL (ref 70–99)
GLUCOSE BLDC GLUCOMTR-MCNC: 113 MG/DL (ref 70–99)
GLUCOSE BLDC GLUCOMTR-MCNC: 138 MG/DL (ref 70–99)
GLUCOSE BLDC GLUCOMTR-MCNC: 224 MG/DL (ref 70–99)
GLUCOSE BLDC GLUCOMTR-MCNC: 229 MG/DL (ref 70–99)
GLUCOSE BLDC GLUCOMTR-MCNC: 246 MG/DL (ref 70–99)
HCT VFR BLD AUTO: 30.1 % (ref 35–47)
HGB BLD-MCNC: 9.7 G/DL (ref 11.7–15.7)
IMM GRANULOCYTES # BLD: 0 10E3/UL
IMM GRANULOCYTES NFR BLD: 1 %
LYMPHOCYTES # BLD AUTO: 0.9 10E3/UL (ref 0.8–5.3)
LYMPHOCYTES NFR BLD AUTO: 35 %
MAGNESIUM SERPL-MCNC: 2 MG/DL (ref 1.6–2.3)
MCH RBC QN AUTO: 29.7 PG (ref 26.5–33)
MCHC RBC AUTO-ENTMCNC: 32.2 G/DL (ref 31.5–36.5)
MCV RBC AUTO: 92 FL (ref 78–100)
MONOCYTES # BLD AUTO: 0.4 10E3/UL (ref 0–1.3)
MONOCYTES NFR BLD AUTO: 16 %
NEUTROPHILS # BLD AUTO: 1.1 10E3/UL (ref 1.6–8.3)
NEUTROPHILS NFR BLD AUTO: 41 %
NRBC # BLD AUTO: 0 10E3/UL
NRBC BLD AUTO-RTO: 0 /100
PHOSPHATE SERPL-MCNC: 3.9 MG/DL (ref 2.5–4.5)
PLATELET # BLD AUTO: 322 10E3/UL (ref 150–450)
POTASSIUM BLD-SCNC: 3.9 MMOL/L (ref 3.4–5.3)
RBC # BLD AUTO: 3.27 10E6/UL (ref 3.8–5.2)
SODIUM SERPL-SCNC: 136 MMOL/L (ref 133–144)
WBC # BLD AUTO: 2.7 10E3/UL (ref 4–11)

## 2021-12-17 PROCEDURE — 120N000002 HC R&B MED SURG/OB UMMC

## 2021-12-17 PROCEDURE — 99233 SBSQ HOSP IP/OBS HIGH 50: CPT | Performed by: INTERNAL MEDICINE

## 2021-12-17 PROCEDURE — 85025 COMPLETE CBC W/AUTO DIFF WBC: CPT | Performed by: HOSPITALIST

## 2021-12-17 PROCEDURE — 84100 ASSAY OF PHOSPHORUS: CPT | Performed by: INTERNAL MEDICINE

## 2021-12-17 PROCEDURE — 250N000009 HC RX 250: Performed by: HOSPITALIST

## 2021-12-17 PROCEDURE — 250N000011 HC RX IP 250 OP 636: Performed by: INTERNAL MEDICINE

## 2021-12-17 PROCEDURE — 99233 SBSQ HOSP IP/OBS HIGH 50: CPT | Performed by: NURSE PRACTITIONER

## 2021-12-17 PROCEDURE — 36591 DRAW BLOOD OFF VENOUS DEVICE: CPT | Performed by: INTERNAL MEDICINE

## 2021-12-17 PROCEDURE — 80048 BASIC METABOLIC PNL TOTAL CA: CPT | Performed by: INTERNAL MEDICINE

## 2021-12-17 PROCEDURE — 83735 ASSAY OF MAGNESIUM: CPT | Performed by: INTERNAL MEDICINE

## 2021-12-17 PROCEDURE — 99207 PR CDG-MDM COMPONENT: MEETS LOW - DOWN CODED: CPT | Performed by: HOSPITALIST

## 2021-12-17 PROCEDURE — 250N000013 HC RX MED GY IP 250 OP 250 PS 637: Performed by: HOSPITALIST

## 2021-12-17 PROCEDURE — 250N000009 HC RX 250: Performed by: INTERNAL MEDICINE

## 2021-12-17 PROCEDURE — 99232 SBSQ HOSP IP/OBS MODERATE 35: CPT | Performed by: HOSPITALIST

## 2021-12-17 RX ADMIN — HYDROMORPHONE HYDROCHLORIDE 0.2 MG: 0.2 INJECTION, SOLUTION INTRAMUSCULAR; INTRAVENOUS; SUBCUTANEOUS at 00:35

## 2021-12-17 RX ADMIN — Medication 40 MG: at 06:33

## 2021-12-17 RX ADMIN — INSULIN ASPART 3 UNITS: 100 INJECTION, SOLUTION INTRAVENOUS; SUBCUTANEOUS at 10:17

## 2021-12-17 RX ADMIN — I.V. FAT EMULSION 250 ML: 20 EMULSION INTRAVENOUS at 20:11

## 2021-12-17 RX ADMIN — Medication: at 20:10

## 2021-12-17 ASSESSMENT — ACTIVITIES OF DAILY LIVING (ADL)
ADLS_ACUITY_SCORE: 6

## 2021-12-17 NOTE — PLAN OF CARE
[id number]  A&O x4. VSS on RA. SBA. IV Dilaudid given x1.  G tube draining to gravity. Port infusing TPN @ 45 ml/hr. LPIV SL. BS's q4 stable, no insulin coverage needed.  Voiding spontaneously. Full liquid diet, denied nausea. Right nostril tenderness r/t previous [place] tube. Pt states passing gas.

## 2021-12-17 NOTE — PROGRESS NOTES
CLINICAL NUTRITION SERVICES - BRIEF NOTE  *See RD notes on 12/14 and 12/16 for RD notes/recs this admission     Nutrition Prescription    RECOMMENDATIONS FOR MDs/PROVIDERS TO ORDER:  Please alert RD or pharmacist if pt requires adjustments to PN fluid volume    Recommendations already ordered by Registered Dietitian (RD):  Advance dextrose to 200 g/day which will be updated goal (paged Endo with update)    Goal TPN: 1080 mL/day with 200 g dextrose, 75 g AA, and 250 mL 20% IV lipids 5 days/week to provide 1337 kcal (32 kcal/kg), 1.8 g/kg PRO, GIR 3.4, and 27% kcal from fat per dosing weight 41.3 kg       TPN dextrose advanced to 170 g last night. K+, Mg++, Phos WNL today.  BGs much improved (100s-200s since yesterday afternoon).    Full Liquid Diet.    INTERVENTIONS  Implementation  Collaboration with other providers: paged Endo about TPN plan (see above)  Parenteral Nutrition/IV Fluids - discussed above TPN plan with pharmacist      Monitoring/Evaluation  Progress toward goals will be monitored and evaluated per protocol.     Margarette Potter, RD, , LD  Weekday Pager: [phone]  Weekday Units covered: 7C (all beds) and 5A (beds 5201 through 5211-2)  Weekend/Holiday RD Pager: [phone]

## 2021-12-17 NOTE — PROGRESS NOTES
[facility]    Medicine Progress Note - Hospitalist Service, Gold 8       Date of Admission:  12/12/2021    Assessment & Plan           Shira Joy is a 41 year old female admitted on 12/12/2021. She has PMHx of metastatic gastric adenocarcinoma with liver and ovarian metastases and peritoneal carcinomatosis (diagnosed 7/2021, on treatment with FOLFOX plus trastuzumab and keydtruda since 8/25/21) and recent recurrent SBO (most recently admitted 12/6-12/9) presented with abdominal pain and vomiting found to have another episode of SBO.     Recurrent Small Bowel Obstruction  Gastric Adenocarcinoma with metastases to liver and ovaries, peritoneal carcinomatosis  - Started on FOLFOX + trastuzumab + pembrolizumab in 08/2021. Plan at 10/14/21 follow-up was to continue this for an additional two months given difficulty in interpreting her restaging scans (given lack of ideal timing for her initial scans).   - She was doing well for the past 3 days since her 12/9 discharge for SBO until around 1pm on the day of admission, where CT showed recurrent SBO  * venting G-tube placed by GI 12/15 ---> teaching done 12/16 w/ bag to gravity, home suction PRN for emesis and abdominal pain.  - TPN started 12/15 + FLD while clamped  - home PPI --> given history of perforated gastric ulcer and stomach tumor involvement she needs this thus will give as PO solution daily pantoprazole to take when G-tube is clamped for oral intake, ordered  *Pain: dilaudid IV PRN, tylenol PRN    Leukopenia & anemia  - suspect 2/2 malnutrition and BM suppression    DM1  Thought to be autoimmune DM1 given concurrent pembrolizumab treatment and low C-Peptide , slender body habitus. A/W autoimmune related hypothalamic, adrenal, thyroid, diabetes.  Normal adrenal, thyroid labs on last admission at [facility]. During her recent admission, endocrine was consulted with recommendation to discharge patient on 12 units of  Lantus daily, 1 unit of short acting insulin for every 15 g of carbohydrates with meals and snacks, and continuation of her home sliding scale.    - appreciate endo management, titrating insulin w/ TPN    Malnutrition:    - Level of malnutrition: Severe   - Based on: moderate/severe subcutaneous fat loss, moderate/severe muscle loss       Diet: parenteral nutrition - ADULT compounded formula  Full Liquid Diet  parenteral nutrition - ADULT compounded formula    DVT Prophylaxis: HOLD through 12/17 post PEG  Magana Catheter: Not present  Central Lines: None  Code Status: Full Code      Disposition Plan   Expected Discharge: 12/18/2021     Anticipated discharge location:  Awaiting care coordination huddle  Delays:            The patient's care was discussed with the Bedside Nurse, Patient and oncology Consultant.and IR consultant and GI consultant.    Tony Howard MD  Hospitalist Service, [address]  Securely message with the Vocera Web Console (learn more here)  Text page via [employer] Paging/Directory    Please see sign in/sign out for up to date coverage information    Clinically Significant Risk Factors Present on Admission            # Severe Malnutrition, POA: based on Weight loss;Reduced intake;Subcutaneous fat loss;Muscle loss (12/14/21 1100)  ______________________________________________________________________    Interval History   Shira says abdominal pain is tolerable around site, no n/v, no bowel movement but passing flatus.    4 point ROS otherwise negative.     Data reviewed today: I reviewed all medications, new labs and imaging results over the last 24 hours. I personally reviewed no images or EKG's today.    Physical Exam   Vital Signs: Temp: (!) 96.6  F (35.9  C) Temp src: Temporal BP: 115/79 Pulse: 71   Resp: 16 SpO2: 100 % O2 Device: None (Room air)    Weight: 90 lbs 11.2 oz    Physical Exam   Constitutional: thin NAD  HEENT: EOMI   Neck:  Symmetric  Cardiovascular: regular without murmurs or gallops  Pulmonary/Chest: CTAB. No respiratory distress.   GI: Soft, mild tender near gtube which remains c/d/i   Musculoskeletal:  No lower extremity edema   Skin: Skin is warm and dry. Port accessed  Neurological: Alert and oriented   Psychiatric:  euthymic      Data   Recent Labs   Lab 12/17/21  1605 12/17/21  1202 12/17/21  0725 12/16/21  0834 12/16/21  0802 12/15/21  0931 12/15/21  0728 12/13/21  0702 12/12/21 2028   WBC  --   --  2.7*  --  3.1*  --  3.3*   < > 7.3   HGB  --   --  9.7*  --  9.6*  --  10.1*   < > 10.2*   MCV  --   --  92  --  90  --  92   < > 88   PLT  --   --  322  --  293  --  283   < > 250   INR  --   --   --   --   --   --  1.08  --   --    NA  --   --  136  --  134  --  131*   < > 137   POTASSIUM  --   --  3.9  --  3.8  --  4.6   < > 3.8   CHLORIDE  --   --  100  --  98  --  98   < > 102   CO2  --   --  25  --  26  --  21   < > 25   BUN  --   --  14  --  13  --  13   < > 11   CR  --   --  0.48*  --  0.50*  --  0.53   < > 0.52   ANIONGAP  --   --  11  --  10  --  12   < > 10   JORGE L  --   --  8.6  --  8.5  --  8.6   < > 9.2   * 229* 192*   < > 243*   < > 418*   < > 146*   ALBUMIN  --   --   --   --   --   --  3.0*  --  3.3*   PROTTOTAL  --   --   --   --   --   --  7.0  --  7.5   BILITOTAL  --   --   --   --   --   --  0.8  --  0.6   ALKPHOS  --   --   --   --   --   --  72  --  76   ALT  --   --   --   --   --   --  24  --  32   AST  --   --   --   --   --   --  15  --  23    < > = values in this interval not displayed.

## 2021-12-17 NOTE — PLAN OF CARE
"Vital signs:  Temp: 97.2  F (36.2  C) Temp src: Temporal BP: 124/86 Pulse: 71   Resp: 17 SpO2: 100 % O2 Device: None (Room air) Oxygen Delivery: 2 LPM Height: 160 cm (5' 3\") Weight: 41.1 kg (90 lb 11.2 oz)  Estimated body mass index is 16.07 kg/m  as calculated from the following:    Height as of this encounter: 1.6 m (5' 3\").    Weight as of this encounter: 41.1 kg (90 lb 11.2 oz).    Diet advanced to full liquids.  G tube site tender to touch.  Denied pain until 2000- dilaudid given with good relief.    TPN and IL started per [facility].  Patient independent with cares and voids spontaneously.  No BM this shift.         "

## 2021-12-17 NOTE — PROGRESS NOTES
IP Diabetes Management  Daily Note           Assessment and Plan:   HPI: Shira Joy is a 41 year old female admitted on 12/12/2021. She has PMHx of metastatic gastric adenocarcinoma with liver and ovarian metastases and peritoneal carcinomatosis (diagnosed 7/2021, on treatment with FOLFOX plus trastuzumab and keydtruda since 8/25/21) and recent recurrent SBO (most recently admitted 12/6-12/9) presented with abdominal pain and vomiting found to have another episode of SBO.    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    2) SBO, NG placed  3) gastric adenocarcinoa with mets to liver, ovaries, peritoneal carcinamatosis      Plan:    -Lantus 6 units every AM (even if TPN off)   -given some PO intake without venting, will start CHO coverage 1:20g with meals, snacks   -reduce to novolog medium resistance sliding scale insulin from high sliding scale insulin Q 4 hours   -increase to 28 units from 24 units of regular insulin to TPN to start tonight (0.14units/g dextrose) for an increase to 200g dextrose from 170g dextrose in continuous TPN    -BG monitoring Q 4 hours   -hypoglycemia protocol   -education complete   -if future dexamethasone with chemo-she could do at least 7 units of NPH in am with Dex    Outpatient follow up: Would like [facility], Santa Mohan PA-C or Zhane Mendenhall MD, order placed 12/16/21  Plan discussed with patient, patient's , bedside RN, RD, and primary team through this note.       Interval History and Assessment: interval glucose trend reviewed:           BG more within target after increase in insulin in TPN yesterday to 0.14 units/g dextrose.  Dextrose will increase to 200g today from 170g.  May discharge tomorrow. She is doing well.  Denies nausea, vomiting.      She is eating this AM and clamping her tube.  No CHO coverage given for her PO intake this AM.  BG into 220s  after no CHO coverage.  Will start 1:20g CHO coverage this afternoon.  Discussed with patient's  over the phone.         Current nutritional intake and type: Orders Placed This Encounter      Full Liquid Diet      Planned Procedures/surgeries: none  Steroid planning: dex 4mg in OR 12/16/21  D5W-containing solutions/medications: TPN continuous to provide 200g dextrose     PTA Diabetes Regimen:   Lantus 12 units once daily, novolog 1:15g CHO with meals, and 1:50>150 sliding scale insulin.      Discharge Planning: discharge to home possibly tomorrow           Diabetes History:   Type of Diabetes: autoimmune DM  Lab Results   Component Value Date    A1C 7.1 12/07/2021              Review of Systems:     The Review of Systems is negative other than noted in the Interval History.           Medications:     Current Facility-Administered Medications   Medication     acetaminophen (TYLENOL) tablet 650 mg     benzocaine 20% (HURRICAINE/TOPEX) 20 % spray 0.5-1 mL     dextrose 10% infusion     glucose gel 15-30 g    Or     dextrose 50 % injection 25-50 mL    Or     glucagon injection 1 mg     HYDROmorphone (DILAUDID) injection 0.2-0.4 mg     [Held by provider] insulin aspart (NovoLOG) injection (RAPID ACTING)     [Held by provider] insulin aspart (NovoLOG) injection (RAPID ACTING)     insulin aspart (NovoLOG) injection (RAPID ACTING)     insulin glargine (LANTUS PEN) injection 6 Units     lidocaine (LMX4) cream     lidocaine (XYLOCAINE) 4 % solution 5 mL     lidocaine 1 % 0.1-1 mL     lipids (INTRALIPID) 20 % infusion 250 mL     LORazepam (ATIVAN) injection 0.5 mg     melatonin tablet 1 mg     naloxone (NARCAN) injection 0.2 mg    Or     naloxone (NARCAN) injection 0.4 mg    Or     naloxone (NARCAN) injection 0.2 mg    Or     naloxone (NARCAN) injection 0.4 mg     ondansetron (ZOFRAN-ODT) ODT tab 4 mg    Or     ondansetron (ZOFRAN) injection 4 mg     oxymetazoline (AFRIN) 0.05 % spray 2 spray     pantoprazole (PROTONIX)  "2 mg/mL suspension 40 mg     parenteral nutrition - ADULT compounded formula     prochlorperazine (COMPAZINE) injection 10 mg    Or     prochlorperazine (COMPAZINE) tablet 10 mg    Or     prochlorperazine (COMPAZINE) suppository 25 mg     sodium chloride (PF) 0.9% PF flush 3 mL     sodium chloride (PF) 0.9% PF flush 3 mL            Physical Exam:    /75 (BP Location: Right arm)   Pulse 67   Temp 98.1  F (36.7  C) (Temporal)   Resp 18   Ht 1.6 m (5' 3\")   Wt 41.1 kg (90 lb 11.2 oz)   SpO2 100%   BMI 16.07 kg/m    General: pleasant, in no distress.   HEENT: normocephalic, atraumatic. Oral mucous membranes moist. NGT in place.  Lungs: unlabored respiration, no cough  ABD: rounded, nondistended  Skin: warm and dry, no obvious lesions  MSK:  moves all extremities  Lymp:  no LE edema   Mental status:  alert, oriented to self, place, time  Psych:  affect, calm and appropriate interaction             Data:     Recent Labs   Lab 12/17/21  0725 12/17/21  0413 12/17/21  0021 12/16/21  2033 12/16/21  1604 12/16/21  1157   * 138* 113* 275* 285* 209*     Lab Results   Component Value Date    WBC 2.7 (L) 12/17/2021    WBC 3.1 (L) 12/16/2021    WBC 3.3 (L) 12/15/2021    HGB 9.7 (L) 12/17/2021    HGB 9.6 (L) 12/16/2021    HGB 10.1 (L) 12/15/2021    HCT 30.1 (L) 12/17/2021    HCT 28.9 (L) 12/16/2021    HCT 31.4 (L) 12/15/2021    MCV 92 12/17/2021    MCV 90 12/16/2021    MCV 92 12/15/2021     12/17/2021     12/16/2021     12/15/2021     Lab Results   Component Value Date     12/17/2021     12/16/2021     (L) 12/15/2021    POTASSIUM 3.9 12/17/2021    POTASSIUM 3.8 12/16/2021    POTASSIUM 4.6 12/15/2021    CHLORIDE 100 12/17/2021    CHLORIDE 98 12/16/2021    CHLORIDE 98 12/15/2021    CO2 25 12/17/2021    CO2 26 12/16/2021    CO2 21 12/15/2021     (H) 12/17/2021     (H) 12/17/2021     (H) 12/17/2021     Lab Results   Component Value Date    BUN 14 12/17/2021 "    BUN 13 12/16/2021    BUN 13 12/15/2021     No results found for: TSH  Lab Results   Component Value Date    AST 15 12/15/2021    AST 23 12/12/2021    AST 28 12/06/2021    ALT 24 12/15/2021    ALT 32 12/12/2021    ALT 65 (H) 12/06/2021    ALKPHOS 72 12/15/2021    ALKPHOS 76 12/12/2021    ALKPHOS 84 12/06/2021           35 minutes spent on the date of the encounter doing chart review, history and exam, documentation and further activities per the note      Over 50% of my time on the unit was spent counseling the patient and/or coordinating care regarding acute hyperglycemia management.  See note for details.    To contact Endocrine Diabetes service:   From 8AM-4PM: page inpatient diabetes provider that is following the patient  For questions or updates from 4PM-8AM: page the diabetes job code for on call fellow: [phone]    DEBBI Cleaning, CNP  Inpatient Diabetes Management Service  Pager [phone]

## 2021-12-17 NOTE — PROGRESS NOTES
Hematology / Oncology  Daily Progress Note   Date of Service: 12/17/2021  Patient: Shira Joy  MRN: [id number]  Admission Date: 12/12/2021  Hospital Day # 4  Cancer Diagnosis: Metastatic gastric adenocarcinoma  Primary Outpatient Oncologist: Dr. Peterson ([place]), Dr. Schmidt ([facility])  Current Treatment Plan: FOLFOX + trastuzumab + pembrolizumab    Recommendations:  - No further recommendations. Per primary team, pt will discharge tomorrow morning. Oncology will sign off.   - Patient should follow up with outpatient primary oncologist for further discussions regarding treatment.     Assessment & Plan:   Ms. Shira Joy is a 41 yoF with metastatic gastric adenocarcinoma with metastases to the liver, ovaries, and peritoneum treated with FOLFOX + trastuzumab + pembrolizumab and c/b autoimmune diabetes and recent SBO who presented with  abdominal pain and new emesis found to have recurrent SBO.     # Metastatic gastric adenocarcinoma  # Malignant SBO  # Autoimmune DM     Patient now w/ PEG tube in place and plan to continue tube feeds with goal rate set and ability to cycle to allow for time while disconnected. Patient aware of PEG management and received PEG cares. She is closely monitoring symptoms and understands to return if concern for SBO unrelieved w/ suction.    Oncologic History:  (Copied & updated from last [place] document in CareEverywhere on 10/14/21)     7/24/2021 Initial Diagnosis   She presented with few weeks of abdominal pain, which she attributed to her known fibroids. On 07/24/2021, she developed acute onset of severe abdominal pain prompting emergency department evaluation. CT of the abdomen revealed free air concerning for viscus perforation. She underwent exploratory laparotomy which revealed a perforated gastric ulcer. This was repaired with a Frank patch. Additionally, she was noted to have omental and peritoneal nodules which were biopsied. Intraoperative EGD revealed a large  gastric mass which was also biopsied.    7/24/2021 Biopsy/Pathology   Gastric biopsy: Poorly differentiated adenocarcinoma with focal Signet ring formation. Immunohistochemical stain for Helicobacter pylori is negative.     Additional biopsies from the mesentery, gastrocolic ligament, and a left pelvic sidewall nodule were all consistent with metastatic poorly differentiated adenocarcinoma.    HER2 FISH was positive for HER2 amplification (HER2/D17Z! Ratio: 2.75)    PD-L1 and MMR testing were pending at the time of initial consultation.     9/3/2021 Biopsy/Pathology   PDL1, mismatch repair, and HER2 IHC on the initial 7/24 biopsy were delayed due to inadvertent cancellation of the tests initially.     Results were ultimately reported 9/2021:     PD-L1 CPS 10 (PD-L1 clone 22C3: tumor cells 0%, TILs 10%)    Mismatch repair intact (IHC)    HER2 1+ (IHC)    9/13/2021 Genetic Testing and Tumor Genotyping   Strong family history esophageal, gastric, colon, and other cancer. Clinical genetics consult obtained, and Common Hereditary Cancers Panel ([employer]) sent      Started on FOLFOX + trastuzumab + pembrolizumab in 08/2021. Plan at 10/14/21 follow-up was to continue this for an additional two months given difficulty in interpreting her restaging scans (given lack of ideal timing for her initial scans).      Patient was seen and plan of care was discussed with attending physician Dr. Jacobs.     Thank you for the opportunity to partake in this patients plan of care. Please do not hesitate to page with questions. We will continue to follow.     Taylor Pollard PA-C  Pager: [phone]  ___________________________________________________________________    Subjective & Interval History:    No acute events overnight. Patient is feeling well today. She has been eating slowly. PEG tube has been closed and patient has had no discomfort so far. She has been drinking less water than normal, encouraged her that it's  "ok to drink. She is pleased she was able to have the tube placed and is looking forward to going home tomorrow. Questions were answered at bedside.     Physical Exam:    Blood pressure 127/75, pulse 67, temperature 98.1  F (36.7  C), temperature source Temporal, resp. rate 18, height 1.6 m (5' 3\"), weight 41.1 kg (90 lb 11.2 oz), SpO2 100 %.    Constitutional: Awake and conversational. Non- toxic appearing. No acute distress. Well developed, very thin, hydrated, Appears stated age.   HEENT: Normocephalic, atraumatic. Sclerae anicteric. EOM intact.   Lymph: Neck supple. No significant adenopathy noted.   Respiratory: Breathing comfortably with no increased work on room air. Good air exchange. No signs of respiratory distress or accessory muscle use.   Skin: Skin is clean, dry, intact. No jaundice appreciated.   Musculoskeletal/ Extremities: No redness or swelling of the joints appreciated.  Nailbeds pink and without cyanosis or clubbing.   Neurologic: Alert and oriented. Speech normal. Grossly nonfocal. Memory and thought process preserved.   Neuropsychiatric: Calm, affect congruent to situation. Appropriate mood and affect. Good judgment and insight. No visual/auditory hallucinations.    Labs & Studies: I personally reviewed the following studies:  ROUTINE LABS (Last four results):  CMP  Recent Labs   Lab 12/17/21  1202 12/17/21  0725 12/17/21  0413 12/17/21  0021 12/16/21  0834 12/16/21  0802 12/15/21  0931 12/15/21  0728 12/14/21  0721 12/14/21  0707 12/13/21  0702 12/12/21 2028   NA  --  136  --   --   --  134  --  131*  --  137  --  137   POTASSIUM  --  3.9  --   --   --  3.8  --  4.6  --  3.8   < > 3.8   CHLORIDE  --  100  --   --   --  98  --  98  --  103  --  102   CO2  --  25  --   --   --  26  --  21  --  24  --  25   ANIONGAP  --  11  --   --   --  10  --  12  --  10  --  10   * 192* 138* 113*   < > 243*   < > 418*   < > 192*   < > 146*   BUN  --  14  --   --   --  13  --  13  --  10  --  11   CR  " --  0.48*  --   --   --  0.50*  --  0.53  --  0.58  --  0.52   GFRESTIMATED  --  >90  --   --   --  >90  --  >90  --  >90  --  >90   JORGE L  --  8.6  --   --   --  8.5  --  8.6  --  8.6  --  9.2   MAG  --  2.0  --   --   --  2.0  --  2.2  --  2.1  --   --    PHOS  --  3.9  --   --   --  3.6  --  3.6  --  3.3  --   --    PROTTOTAL  --   --   --   --   --   --   --  7.0  --   --   --  7.5   ALBUMIN  --   --   --   --   --   --   --  3.0*  --   --   --  3.3*   BILITOTAL  --   --   --   --   --   --   --  0.8  --   --   --  0.6   ALKPHOS  --   --   --   --   --   --   --  72  --   --   --  76   AST  --   --   --   --   --   --   --  15  --   --   --  23   ALT  --   --   --   --   --   --   --  24  --   --   --  32    < > = values in this interval not displayed.     CBC  Recent Labs   Lab 12/17/21  0725 12/16/21  0802 12/15/21  0728 12/14/21  0707   WBC 2.7* 3.1* 3.3* 3.3*   RBC 3.27* 3.21* 3.40* 3.18*   HGB 9.7* 9.6* 10.1* 9.3*   HCT 30.1* 28.9* 31.4* 29.8*   MCV 92 90 92 94   MCH 29.7 29.9 29.7 29.2   MCHC 32.2 33.2 32.2 31.2*   RDW 14.6 14.6 14.6 14.8    293 283 227     INR  Recent Labs   Lab 12/15/21  0728   INR 1.08       Medications list for reference:  Current Facility-Administered Medications   Medication     acetaminophen (TYLENOL) tablet 650 mg     benzocaine 20% (HURRICAINE/TOPEX) 20 % spray 0.5-1 mL     dextrose 10% infusion     glucose gel 15-30 g    Or     dextrose 50 % injection 25-50 mL    Or     glucagon injection 1 mg     HYDROmorphone (DILAUDID) injection 0.2-0.4 mg     [Held by provider] insulin aspart (NovoLOG) injection (RAPID ACTING)     [Held by provider] insulin aspart (NovoLOG) injection (RAPID ACTING)     insulin aspart (NovoLOG) injection (RAPID ACTING)     insulin glargine (LANTUS PEN) injection 6 Units     lidocaine (LMX4) cream     lidocaine (XYLOCAINE) 4 % solution 5 mL     lidocaine 1 % 0.1-1 mL     lipids (INTRALIPID) 20 % infusion 250 mL     LORazepam (ATIVAN) injection 0.5 mg      melatonin tablet 1 mg     naloxone (NARCAN) injection 0.2 mg    Or     naloxone (NARCAN) injection 0.4 mg    Or     naloxone (NARCAN) injection 0.2 mg    Or     naloxone (NARCAN) injection 0.4 mg     ondansetron (ZOFRAN-ODT) ODT tab 4 mg    Or     ondansetron (ZOFRAN) injection 4 mg     pantoprazole (PROTONIX) 2 mg/mL suspension 40 mg     parenteral nutrition - ADULT compounded formula     parenteral nutrition - ADULT compounded formula     prochlorperazine (COMPAZINE) injection 10 mg    Or     prochlorperazine (COMPAZINE) tablet 10 mg    Or     prochlorperazine (COMPAZINE) suppository 25 mg     sodium chloride (PF) 0.9% PF flush 3 mL     sodium chloride (PF) 0.9% PF flush 3 mL

## 2021-12-17 NOTE — PROGRESS NOTES
[facility]     Received referral from Katarzyna Juarez RNCC for IV TPN.  Benefits verified.  Pt has [employer]BS plan, she has met her deductible and max OOP so she should be covered at 100%.  Spoke with patient's spouse to review home infusion services, review benefits and offer choice of providers.  Patient would like to use [facility] for home infusion.  Shira is expected to dc as early as tomorrow and will be going home on continuous TPN.  She has not done home IV therapy before however she and her spouse have been to the [facility] for initial teaching.  Met with spouse at bedside and provided him with information about [facility] services.  Explained about administration method with TPN and pump in a backpack for mobility.  Informed him that home nurses provide ongoing teaching on prep and self-administration of the TPN over the course of the first few days after discharge until they are comfortable with it.  Informed spouse about supplies and delivery of supplies, storage of TPN, cycle schedule, plan for SNV and 24/7 availability of I staff while on IV therapy.  Pending final discharge orders, [facility] will plan to do delivery and hook up of TPN at the hospital on day of discharge.  Spouse verbalized understanding of all information given.  They are willing and able to learn and manage home IV therapy. Questions answered.  Plan for [facility] to provide home SNVs after discharge.    [facility] Liaison will follow along to assist with discharge home with infusion needs.      Thank you for the referral.     DERICK Jarrell  [facility] Nurse Liaison   [email or web address]  Cell: [phone] M-F  [facility] Main: [phone]

## 2021-12-18 ENCOUNTER — HOME INFUSION (PRE-WILLOW HOME INFUSION) (OUTPATIENT)
Dept: PHARMACY | Facility: CLINIC | Age: 41
End: 2021-12-18

## 2021-12-18 VITALS
TEMPERATURE: 98.5 F | DIASTOLIC BLOOD PRESSURE: 78 MMHG | HEIGHT: 63 IN | BODY MASS INDEX: 16.07 KG/M2 | SYSTOLIC BLOOD PRESSURE: 107 MMHG | OXYGEN SATURATION: 99 % | RESPIRATION RATE: 17 BRPM | WEIGHT: 90.7 LBS | HEART RATE: 77 BPM

## 2021-12-18 LAB
ANION GAP SERPL CALCULATED.3IONS-SCNC: 6 MMOL/L (ref 3–14)
BUN SERPL-MCNC: 13 MG/DL (ref 7–30)
CALCIUM SERPL-MCNC: 8.5 MG/DL (ref 8.5–10.1)
CHLORIDE BLD-SCNC: 101 MMOL/L (ref 94–109)
CO2 SERPL-SCNC: 29 MMOL/L (ref 20–32)
CREAT SERPL-MCNC: 0.43 MG/DL (ref 0.52–1.04)
GFR SERPL CREATININE-BSD FRML MDRD: >90 ML/MIN/1.73M2
GLUCOSE BLD-MCNC: 181 MG/DL (ref 70–99)
GLUCOSE BLDC GLUCOMTR-MCNC: 134 MG/DL (ref 70–99)
GLUCOSE BLDC GLUCOMTR-MCNC: 191 MG/DL (ref 70–99)
GLUCOSE BLDC GLUCOMTR-MCNC: 207 MG/DL (ref 70–99)
GLUCOSE BLDC GLUCOMTR-MCNC: 262 MG/DL (ref 70–99)
HOLD SPECIMEN: NORMAL
MAGNESIUM SERPL-MCNC: 2 MG/DL (ref 1.6–2.3)
POTASSIUM BLD-SCNC: 4 MMOL/L (ref 3.4–5.3)
SODIUM SERPL-SCNC: 136 MMOL/L (ref 133–144)

## 2021-12-18 PROCEDURE — 99239 HOSP IP/OBS DSCHRG MGMT >30: CPT | Performed by: HOSPITALIST

## 2021-12-18 PROCEDURE — 99233 SBSQ HOSP IP/OBS HIGH 50: CPT | Performed by: INTERNAL MEDICINE

## 2021-12-18 PROCEDURE — 82947 ASSAY GLUCOSE BLOOD QUANT: CPT | Performed by: INTERNAL MEDICINE

## 2021-12-18 PROCEDURE — 82310 ASSAY OF CALCIUM: CPT | Performed by: INTERNAL MEDICINE

## 2021-12-18 PROCEDURE — 36591 DRAW BLOOD OFF VENOUS DEVICE: CPT | Performed by: INTERNAL MEDICINE

## 2021-12-18 PROCEDURE — 83735 ASSAY OF MAGNESIUM: CPT | Performed by: INTERNAL MEDICINE

## 2021-12-18 PROCEDURE — 250N000013 HC RX MED GY IP 250 OP 250 PS 637: Performed by: HOSPITALIST

## 2021-12-18 RX ORDER — AMOXICILLIN 250 MG
1 CAPSULE ORAL DAILY
Qty: 60 TABLET | Refills: 1 | Status: SHIPPED | OUTPATIENT
Start: 2021-12-18

## 2021-12-18 RX ORDER — ACETAMINOPHEN 325 MG/1
650 TABLET ORAL EVERY 4 HOURS PRN
COMMUNITY
Start: 2021-12-18

## 2021-12-18 RX ORDER — ONDANSETRON 4 MG/1
4 TABLET, ORALLY DISINTEGRATING ORAL EVERY 6 HOURS PRN
Qty: 30 TABLET | Refills: 0 | Status: SHIPPED | OUTPATIENT
Start: 2021-12-18

## 2021-12-18 RX ORDER — NICOTINE POLACRILEX 4 MG
15 LOZENGE BUCCAL
Qty: 120 G | Refills: 1 | Status: SHIPPED | OUTPATIENT
Start: 2021-12-18

## 2021-12-18 RX ORDER — INSULIN LISPRO 100 [IU]/ML
0-30 INJECTION, SOLUTION INTRAVENOUS; SUBCUTANEOUS
Qty: 15 ML | COMMUNITY
Start: 2021-12-18 | End: 2021-12-18

## 2021-12-18 RX ORDER — INSULIN LISPRO 100 [IU]/ML
0-30 INJECTION, SOLUTION INTRAVENOUS; SUBCUTANEOUS EVERY 4 HOURS
Qty: 15 ML | Refills: 3 | Status: SHIPPED | OUTPATIENT
Start: 2021-12-18

## 2021-12-18 RX ORDER — HYDROMORPHONE HYDROCHLORIDE 2 MG/1
2 TABLET ORAL
Qty: 20 TABLET | Refills: 0 | Status: SHIPPED | OUTPATIENT
Start: 2021-12-18 | End: 2021-12-21

## 2021-12-18 RX ORDER — INSULIN LISPRO 100 [IU]/ML
INJECTION, SOLUTION INTRAVENOUS; SUBCUTANEOUS
Qty: 15 ML | Refills: 3 | Status: SHIPPED | OUTPATIENT
Start: 2021-12-18

## 2021-12-18 RX ORDER — AMOXICILLIN 250 MG
1 CAPSULE ORAL ONCE
Status: DISCONTINUED | OUTPATIENT
Start: 2021-12-18 | End: 2021-12-18 | Stop reason: HOSPADM

## 2021-12-18 RX ORDER — PANTOPRAZOLE SODIUM 40 MG/1
40 TABLET, DELAYED RELEASE ORAL
Status: DISCONTINUED | OUTPATIENT
Start: 2021-12-18 | End: 2021-12-18 | Stop reason: HOSPADM

## 2021-12-18 RX ORDER — PANTOPRAZOLE SODIUM 40 MG/1
40 TABLET, DELAYED RELEASE ORAL
Qty: 90 TABLET | Refills: 3 | Status: SHIPPED | OUTPATIENT
Start: 2021-12-18

## 2021-12-18 RX ORDER — INSULIN LISPRO 100 [IU]/ML
INJECTION, SOLUTION INTRAVENOUS; SUBCUTANEOUS
Qty: 15 ML | Refills: 0 | Status: SHIPPED | OUTPATIENT
Start: 2021-12-18 | End: 2021-12-18

## 2021-12-18 RX ADMIN — PANTOPRAZOLE SODIUM 40 MG: 40 TABLET, DELAYED RELEASE ORAL at 09:29

## 2021-12-18 ASSESSMENT — ACTIVITIES OF DAILY LIVING (ADL)
ADLS_ACUITY_SCORE: 6

## 2021-12-18 NOTE — PLAN OF CARE
Assumed care for pt [id number]  VS and assessment as documented. Denies nausea. Reported some back pain, declined intervention. Elevated BS, insulin coverage given as ordered. G-tube to gravity, Port infusing TPN and lipids. Pt sleeping between cares. Voiding spontaneously not saving, no BM this shift. Needs met, safety maintained. Will continue plan of care.

## 2021-12-18 NOTE — PLAN OF CARE
AVSS on room air. Reported some abdominal pain but declined medications. On a full liquid diet, denied nausea but reported fullness and only taking in small amounts. Blood sugars elevated - insulin orders modified today. Patient is on Lantus, sliding scale Novolog and carb coverage Novolog. G tube to gravity, patient attempts to clamp as tolerated. G tube education re-inforced. TPN via port. PIV removed per patient request. Showered.  at the bedside. Continue with plan of care.

## 2021-12-18 NOTE — PROGRESS NOTES
IP Diabetes Management  Daily Note           Assessment and Plan:   HPI: Shira Joy is a 41 year old female admitted on 12/12/2021. She has PMHx of metastatic gastric adenocarcinoma with liver and ovarian metastases and peritoneal carcinomatosis (diagnosed 7/2021, on treatment with FOLFOX plus trastuzumab and keydtruda since 8/25/21) and recent recurrent SBO (most recently admitted 12/6-12/9) presented with abdominal pain and vomiting found to have another episode of SBO.    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    2) Gastric adenocarcinoa with mets to liver, ovaries, peritoneal carcinomatosis  3) on TPN      Plan:   - Continue Glargine 6 units every AM (even if TPN off).    - Continue Novolog 1:20g CHO with meals, snacks  - Continue  Novolog medium resistance sliding scale insulin Q 4 hours  - Continue Regular insulin 28 units on TPN (0.14units/g dextrose)    - BG monitoring Q 4 hours  - Hypoglycemia protocol  - Education complete  - if future dexamethasone with chemo-she could do at least 7 units of NPH in am with Dex    If discharge plans for today are confirmed, patient can be discharged on current insulin doses.  If there are plans to cycle her TPN, please make sure Glargine is administered about one hour prior to the end of the TPN cycle.     Outpatient follow up: Would like [facility], Santa Mohan PA-C or Zhane Mendenhall MD, order placed 12/16/21, follow-up scheduled for January 2022    Plan discussed with patient and, bedside RN in person and with primary team through this note.       Interval History and Assessment: interval glucose trend reviewed.  Some hyperglycemia during the day, BG at target over night.    Likely to discharge today.  She is doing well.  Denies nausea, vomiting.  Trying to eat some yogurt as tolerate this AM and clamping her tube.      If patient were staying, I would  No treatment. New glasses today; update as needed. Discussed that new prescription is only a slight change. increase her Glargine dose.  As she is going home, and physical activity will increase, will not make changes on her Glargine dose.     Current nutritional intake and type: Orders Placed This Encounter      Full Liquid Diet      Planned Procedures/surgeries: none  Steroid planning: dex 4mg in OR 12/16/21  D5W-containing solutions/medications: TPN continuous to provide 200g dextrose, may cycle when home     PTA Diabetes Regimen:   Lantus 12 units once daily, novolog 1:15g CHO with meals, and 1:50>150 sliding scale insulin.      Discharge Planning: discharge to home likely today           Diabetes History:   Type of Diabetes: autoimmune DM  Lab Results   Component Value Date    A1C 7.1 12/07/2021              Review of Systems:     The Review of Systems is negative other than noted in the Interval History.           Medications:     Current Facility-Administered Medications   Medication     acetaminophen (TYLENOL) tablet 650 mg     benzocaine 20% (HURRICAINE/TOPEX) 20 % spray 0.5-1 mL     dextrose 10% infusion     glucose gel 15-30 g    Or     dextrose 50 % injection 25-50 mL    Or     glucagon injection 1 mg     HYDROmorphone (DILAUDID) injection 0.2-0.4 mg     insulin aspart (NovoLOG) injection (RAPID ACTING)     insulin aspart (NovoLOG) injection (RAPID ACTING)     [Held by provider] insulin aspart (NovoLOG) injection (RAPID ACTING)     insulin glargine (LANTUS PEN) injection 6 Units     lidocaine (LMX4) cream     lidocaine (XYLOCAINE) 4 % solution 5 mL     lidocaine 1 % 0.1-1 mL     lipids (INTRALIPID) 20 % infusion 250 mL     LORazepam (ATIVAN) injection 0.5 mg     melatonin tablet 1 mg     naloxone (NARCAN) injection 0.2 mg    Or     naloxone (NARCAN) injection 0.4 mg    Or     naloxone (NARCAN) injection 0.2 mg    Or     naloxone (NARCAN) injection 0.4 mg     ondansetron (ZOFRAN-ODT) ODT tab 4 mg    Or     ondansetron (ZOFRAN) injection 4 mg     pantoprazole (PROTONIX) EC tablet 40 mg     parenteral nutrition -  "ADULT compounded formula     prochlorperazine (COMPAZINE) injection 10 mg    Or     prochlorperazine (COMPAZINE) tablet 10 mg    Or     prochlorperazine (COMPAZINE) suppository 25 mg     sodium chloride (PF) 0.9% PF flush 3 mL     sodium chloride (PF) 0.9% PF flush 3 mL            Physical Exam:    /78 (BP Location: Left arm)   Pulse 77   Temp 98.5  F (36.9  C) (Temporal)   Resp 17   Ht 1.6 m (5' 3\")   Wt 41.1 kg (90 lb 11.2 oz)   SpO2 99%   BMI 16.07 kg/m    General: pleasant, in no distress.   HEENT: normocephalic, atraumatic. Oral mucous membranes moist. NGT in place.  Lungs: unlabored respiration, no cough  ABD: rounded, nondistended  Skin: warm and dry, no obvious lesions  MSK:  moves all extremities  Lymp:  no LE edema   Mental status:  alert, oriented to self, place, time  Psych:  affect, calm and appropriate interaction             Data:     Recent Labs   Lab 12/18/21  0743 12/18/21  0409 12/18/21  0002 12/17/21  2013 12/17/21  1605 12/17/21  1202   * 134* 262* 224* 246* 229*     Lab Results   Component Value Date    WBC 2.7 (L) 12/17/2021    WBC 3.1 (L) 12/16/2021    WBC 3.3 (L) 12/15/2021    HGB 9.7 (L) 12/17/2021    HGB 9.6 (L) 12/16/2021    HGB 10.1 (L) 12/15/2021    HCT 30.1 (L) 12/17/2021    HCT 28.9 (L) 12/16/2021    HCT 31.4 (L) 12/15/2021    MCV 92 12/17/2021    MCV 90 12/16/2021    MCV 92 12/15/2021     12/17/2021     12/16/2021     12/15/2021     Lab Results   Component Value Date     12/18/2021     12/17/2021     12/16/2021    POTASSIUM 4.0 12/18/2021    POTASSIUM 3.9 12/17/2021    POTASSIUM 3.8 12/16/2021    CHLORIDE 101 12/18/2021    CHLORIDE 100 12/17/2021    CHLORIDE 98 12/16/2021    CO2 29 12/18/2021    CO2 25 12/17/2021    CO2 26 12/16/2021     (H) 12/18/2021     (H) 12/18/2021     (H) 12/18/2021     Lab Results   Component Value Date    BUN 13 12/18/2021    BUN 14 12/17/2021    BUN 13 12/16/2021     No results " found for: TSH  Lab Results   Component Value Date    AST 15 12/15/2021    AST 23 12/12/2021    AST 28 12/06/2021    ALT 24 12/15/2021    ALT 32 12/12/2021    ALT 65 (H) 12/06/2021    ALKPHOS 72 12/15/2021    ALKPHOS 76 12/12/2021    ALKPHOS 84 12/06/2021           35 minutes spent on the date of the encounter doing chart review, history and exam, documentation and further activities per the note    Over 50% of my time on the unit was spent counseling the patient and/or coordinating care regarding acute hyperglycemia management.  See note for details.    To contact Endocrine Diabetes service:   Page the Endocrine fellow on call    Sandrita Hickey MD PhD    Division of Endocrinology and Diabetes  [phone]

## 2021-12-18 NOTE — PLAN OF CARE
AVSS. Continued to report abdominal pain, using a heat pad but declined medications. On a full liquid diet. BG elevated and covered with sliding scale and Lantus, however patient only ate small amounts and declined carb coverage. TPN running via port. G tube to gravity bag. [facility] came to set up patient on her home TPN bag. Passing gas, had a BM. Voiding spontaneously. AVS reviewed with patient and . Patient and  refused to accept home suction due to recommendations from the GI team. Writer attempted to call [facility] to arrange pick-ujp, however they are closed for the weekend. Left supplies and note at the nursing desk to follow-up on Monday. Left unit via wheelchair.

## 2021-12-18 NOTE — PROGRESS NOTES
Care Management Discharge Note    Discharge Date: 12/18/2021     Discharge Disposition:  Home with home care as prescribe.    Discharge Services:  Skilled home care RN for home TPN and education reinforcement with G-tube, medication management and as updated I discharge orders.    Discharge DME:      Discharge Transportation:  Family    Private pay costs discussed: Not applicable    Education Provided on the Discharge Plan:  Yes  Persons Notified of Discharge Plans: patient and spouse.  Patient/Family in Agreement with the Plan:  Yes    Handoff Referral Completed: Yes    Additional Information:    [facility] has been updated about MD plans for discharge today.  Home TPN orders faxed and the home care RN for the infusion agency will arrive at the bedside this early afternoon to attach patient to her home TPN and they will continue to follow for education reinforcement in the home setting.      Inpatient Care Coordinator is available for updated transition needs prn.  Home suction machine was delivered to patient yesterday.  Patient will also follow up in clinic as designated in discharge orders.    KAYODE Yeboah.S.LATASHA., R.N., P.H.N..  Care Coordinator     Pager   [facility]

## 2021-12-18 NOTE — DISCHARGE SUMMARY
[facility]  Hospitalist Discharge Summary      Date of Admission:  12/12/2021  Date of Discharge:  12/18/2021  3:09 PM  Discharging Provider: Tony Howard MD  Discharge Team: Hospitalist Service, Gold 8    Discharge Diagnoses   Recurrent malignant small bowel obstruction  Checkpoint inhibitor induced autoimmune DM1    Malnutrition:    - Level of malnutrition: Severe   - Based on: weight loss, reduced intake, moderate/severe subcutaneous fat loss, moderate/severe muscle loss      Follow-ups Needed After Discharge   Follow-up Appointments     Adult [facility] Follow-up and recommended labs and tests      Follow up with your oncology team as instructed.    Appointments on [place] and/or [facility] (with [facility] or [facility]   provider or service). Call [phone] if you haven't heard regarding   these appointments within 7 days of discharge.         Follow Up ([facility])      Follow up with Santa Mohan PA-C ([facility]) at [facility]   in [place]    Appointments on [place] and/or [facility] (with [facility] or [facility]   provider or service). Call [phone] if you haven't heard regarding   these appointments within 7 days of discharge.           Discharge Disposition   Discharged to home  Condition at discharge: Stable      Hospital Course   Shira Joy is a 41 year old female admitted on 12/12/2021. She has PMHx of metastatic gastric adenocarcinoma with liver and ovarian metastases and peritoneal carcinomatosis (diagnosed 7/2021, on treatment with FOLFOX plus trastuzumab and keydtruda since 8/25/21) and recent recurrent SBO (most recently admitted 12/6-12/9) presented with abdominal pain and vomiting found to have another episode of SBO    CT scan showed mechanical small bowel obstruction with transition of small bowel seen in the right lower abdomen.  An NG tube was inserted and applied to suction and decompressed.  With this patient began to feel  significantly better.  Given this recurrent SBO and likely SBO's to return with her malignancy, attention was turned towards a venting G-tube.  Gastroenterology was able to place the G-tube on December 12 and the NG tube was removed.  Patient was not deemed likely to have stable enteral nutrition and thus was started on TPN to be continued at home.  Her G-tube should only be used for venting, however she was recommended she could have up to a full liquid with diet per GI in which case she should clamp the G-tube for 30 minutes after eating.  Should also have a clamped when taking her pills.    Her white blood cell count was noted to be trending down at time of discharge with an ANC of 1.1.  This was thought to be most likely due to malnutrition and chemotherapeutic agents.  She was instructed of the higher risk of infection and told to immediately call her doctor and return to the emergency department in case of fevers, chills, or other concerning symptoms.    Endocrinology followed closely for her diabetes.  Her insulin home regimen was titrated as well as insulin being added to her TPN.  She was prescribed glucagon and glucose gel at discharge.  Her insulin was adjusted and refilled.  She and her  were given teaching for the TPN, the G-tube, and diabetes education.  She was prescribed a short course of Dilaudid for her residual G-tube site pain, and instructed to take stool softener.  A home suction was also procured and delivered to her home to only use in the event that she develops symptoms of another small bowel obstruction including recurrent emesis and severe abdominal pain.  She and  were counseled on this and that this is to be used as a last resort and if it is needed she should talk to her doctor simultaneously for additional instructions.    They have discharged home infusion was arranged and will meet patient at her home to set up an reoccurred to continue with TPN through her port.  The  plan is to begin cycling TPN at home.      Consultations This Hospital Stay   ONCOLOGY ADULT IP CONSULT  ENDOCRINE DIABETES ADULT IP CONSULT  NUTRITION SERVICES ADULT IP CONSULT  PHARMACY IP CONSULT  GI LUMINAL ADULT IP CONSULT  PHARMACY/NUTRITION TO START AND MANAGE TPN  PHARMACY IP CONSULT  CARE MANAGEMENT / SOCIAL WORK IP CONSULT  INTERVENTIONAL RADIOLOGY ADULT/PEDS IP CONSULT  GI LUMINAL ADULT IP CONSULT  PHARMACY IP CONSULT  DIABETES EDUCATION IP CONSULT  PATIENT LEARNING CENTER IP CONSULT  PHARMACY IP CONSULT  PHARMACY LIAISON FOR MEDICATION COVERAGE CONSULT  PHARMACY IP CONSULT  WOUND OSTOMY CONTINENCE NURSE  IP CONSULT  PHARMACY IP CONSULT  PHARMACY IP CONSULT  PATIENT LEARNING CENTER IP CONSULT  PATIENT LEARNING CENTER IP CONSULT    Code Status   Full Code    Time Spent on this Encounter   I, Tony Howard MD, personally saw the patient today and spent greater than 30 minutes discharging this patient.       Tony Howard MD  [facility] [address] [place] [id number]  Phone: [phone]  ______________________________________________________________________    Physical Exam   Vital Signs: Temp: 98.5  F (36.9  C) Temp src: Temporal BP: 107/78 Pulse: 77   Resp: 17 SpO2: 99 % O2 Device: None (Room air)    Weight: 90 lbs 11.2 oz    Physical Exam   Constitutional: very thin NAD  HEENT: EOMI  Neck: Symmetric  Cardiovascular: regular without murmurs or gallops  Pulmonary/Chest: CTAB. No respiratory distress.  GI: Soft, non-tender , non-distended, g-tube site c/d/i  Musculoskeletal:  No lower extremity edema   Skin: Skin is warm and dry  Neurological: Alert and oriented   Psychiatric:  euthymic         Primary Care Physician   Kae Parmar    Discharge Orders      MISCELLANEOUS DME SUPPLY OR ACCESSORY, NOT OTHERWISE SPECIFIED    Home DME Equipment for [facility]:  [facility]     Ph:  [phone]     Fax:  [phone]     [facility]     Suction Canisters (2)  Suction Connector tube (2)  5 in 1 Connector (2)     Home infusion referral      MD face to face encounter    Documentation of Face to Face and Certification for Home Health Services    I certify that patient: Shira Joy is under my care and that I, or a nurse practitioner or physician's assistant working with me, had a face-to-face encounter that meets the physician face-to-face encounter requirements with this patient on:  December 14, 2021.    This encounter with the patient was in whole, or in part, for the following medical condition, which is the primary reason for home health care: Bowel Obstruction.    I certify that, based on my findings, the following services are medically necessary home health services: Skilled RN for education reinforcement with home TPN and NG Tube for decompression.    My clinical findings support the need for the above services because: MD orders and recommendations by the interdisciplinary team.    Further, I certify that my clinical findings support that this patient is homebound secondary to illness.    Based on the above findings. I certify that this patient is confined to the home and needs intermittent skilled nursing care, physical therapy and/or speech therapy.  The patient is under my care, and I have initiated the establishment of the plan of care.  This patient will be followed by a physician who will periodically review the plan of care.  Physician/Provider to provide follow up care: Kae Parmar    Attending [facility] physician (the Medicare certified [place] provider): Dr. Tony Howard M.D.  Physician Signature: See electronic signature associated with these discharge orders.    Date: 12/14/2021     Follow Up ([facility])    Follow up with Santa Mohan PA-C ([facility]) at [facility] in [place]    Appointments on [place] and/or [facility] (with [facility] or [facility] provider or service). Call [phone] if you  haven't heard regarding these appointments within 7 days of discharge.     Reason for your hospital stay    Small bowel obstruction     Activity    Your activity upon discharge: activity as tolerated     Adult [facility] Follow-up and recommended labs and tests    Follow up with your oncology team as instructed.    Appointments on [place] and/or [facility] (with [facility] or [facility] provider or service). Call [phone] if you haven't heard regarding these appointments within 7 days of discharge.     Diet    Follow this diet upon discharge: Orders Placed This Encounter      Full Liquid Diet + TPN       Significant Results and Procedures   Most Recent 3 CBC's:Recent Labs   Lab Test 12/17/21  0725 12/16/21  0802 12/15/21  0728   WBC 2.7* 3.1* 3.3*   HGB 9.7* 9.6* 10.1*   MCV 92 90 92    293 283   ,   Results for orders placed or performed during the hospital encounter of 12/12/21   CT Abdomen Pelvis w Contrast    Narrative    EXAM: CT ABDOMEN PELVIS W CONTRAST  LOCATION: [facility]  DATE/TIME: 12/12/2021 9:31 PM    INDICATION: Abdominal pain, acute, nonlocalized.  COMPARISON: 12/06/2021.  TECHNIQUE: CT scan of the abdomen and pelvis was performed following injection of IV contrast. Multiplanar reformats were obtained. Dose reduction techniques were used.  CONTRAST: 57 ml Isovue 370.     FINDINGS:   LOWER CHEST: Breast implants.    HEPATOBILIARY: 1.6 cm hypodense solid lesion dome of the liver. Layering calcified gallstones.    PANCREAS: Normal.    SPLEEN: Normal.    ADRENAL GLANDS: Normal.    KIDNEYS/BLADDER: Normal.    BOWEL: Increasing distention of small bowel compatible with mechanical small bowel obstruction. Transition seen in the right lower abdomen. Distal small bowel decompressed. Again seen is wall thickening in the stomach with perigastric soft tissue.    LYMPH NODES: Normal.    VASCULATURE: Unremarkable.    PELVIC ORGANS: Large complex multilobulated  solid enhancing pelvic mass with some cystic change which is stable. Stable ascites.    MUSCULOSKELETAL: Normal.      Impression    IMPRESSION:   1.  Changes of mechanical small bowel obstruction with transition of small bowel seen in the right lower abdomen. Findings have progressed with increasing distention of the small bowel.    2.  No change in the gastric mass or the large multi lobulated inhomogeneous enhancing conglomerate masses in the pelvis.    3.  Stable 1.6 cm hypodense nodule dome of the liver.   XR Abdomen Port 1 View    Narrative    EXAM: XR ABDOMEN PORT 1 VIEWS  LOCATION: [facility]  DATE/TIME: 12/13/2021 2:05 AM    INDICATION: post NG tube insertion  COMPARISON: None.      Impression    IMPRESSION: Partially visualized right-sided central venous catheter. Nasogastric tube terminates over the stomach. Excreted contrast in the renal collecting systems bilaterally. Changes consistent with small bowel obstruction redemonstrated.    XR Surgery LIBAN L/T 5 Min Fluoro w Stills    Narrative    This exam was marked as non-reportable because it will not be read by a   radiologist or a [place] non-radiologist provider.               Discharge Medications   Discharge Medication List as of 12/18/2021 10:59 AM      START taking these medications    Details   Glucagon 3 MG/DOSE POWD Spray 3 mg in nostril every 15 minutes as needed (For blood sugar below 70 apply to one nostril. Repeat after 15 minutes if sugar remains low), Disp-2 each, R-1, E-Prescribe      glucose 40 % (400 mg/mL) gel Take 15 g by mouth every 15 minutes as needed for low blood sugar (less than 70-80)Disp-120 g, [id number]      HYDROmorphone (DILAUDID) 2 MG tablet Take 1 tablet (2 mg) by mouth every 3 hours as needed for pain, Disp-20 tablet, R-0, E-Prescribe      lipids (INTRALIPID) 20 % infusion Inject 250 mLs into the vein every 24 hours Once per day on Tuesday, Wednesday, Thursday, Friday,  Saturday, Transitional      ondansetron (ZOFRAN-ODT) 4 MG ODT tab Take 1 tablet (4 mg) by mouth every 6 hours as needed for nausea or vomiting, Disp-30 tablet, R-0, E-Prescribe      parenteral nutrition - PTA/DISCHARGE ORDER The TPN formula will print on the After Visit Summary Report., Disp-1 each, R-0, Local Print      senna-docusate (SENOKOT-S/PERICOLACE) 8.6-50 MG tablet Take 1 tablet by mouth daily Can take up to 2 tablets twice a day to encourage bowel movement., Disp-60 tablet, R-1, E-Prescribe         CONTINUE these medications which have CHANGED    Details   acetaminophen (TYLENOL) 325 MG tablet Take 2 tablets (650 mg) by mouth every 4 hours as needed for mild pain or fever, OTC      insulin glargine (LANTUS PEN) 100 UNIT/ML pen Inject 6 Units Subcutaneous every morning (before breakfast), Disp-15 mL, R-3, E-PrescribeIf Lantus is not covered by insurance, may substitute Basaglar at same dose and frequency.        !! insulin lispro (HUMALOG KWIKPEN) 100 UNIT/ML (1 unit dial) KWIKPEN Inject 0-30 Units Subcutaneous every 4 hours Correction Scale - MEDIUM INSULIN RESISTANCE DOSING   Do Not give Correction Insulin if BG less than 140. For  - 189 give 1 unit. For  - 239 give 2 units. For  - 289 give 3 units. For   - 339 give 4 units. For  - 399 give 5 units. For BG greater than or equal to 400 give 6 units., Disp-15 mL, R-3, E-Prescribe      !! insulin lispro (HUMALOG KWIKPEN) 100 UNIT/ML (1 unit dial) KWIKPEN Inject 1 Unit of Humalog for every 20 grams of carbohydrates with meals and snacks, Disp-15 mL, R-3, E-Prescribe      pantoprazole (PROTONIX) 40 MG EC tablet Take 1 tablet (40 mg) by mouth every morning (before breakfast), Disp-90 tablet, R-3, E-Prescribe       !! - Potential duplicate medications found. Please discuss with provider.      CONTINUE these medications which have NOT CHANGED    Details   LORazepam (ATIVAN) 0.5 MG tablet Take 0.5 mg by mouth every 4 hours as needed  for anxiety, Historical      VITAMIN D PO Take 1 tablet by mouth daily, Historical         STOP taking these medications       insulin  UNIT/ML injection Comments:   Reason for Stopping:             Allergies   No Known Allergies

## 2021-12-19 ENCOUNTER — HOME INFUSION (PRE-WILLOW HOME INFUSION) (OUTPATIENT)
Dept: PHARMACY | Facility: CLINIC | Age: 41
End: 2021-12-19
Payer: COMMERCIAL

## 2021-12-20 ENCOUNTER — LAB REQUISITION (OUTPATIENT)
Dept: LAB | Facility: CLINIC | Age: 41
End: 2021-12-20
Payer: COMMERCIAL

## 2021-12-20 DIAGNOSIS — C16.2 MALIGNANT NEOPLASM OF BODY OF STOMACH (H): ICD-10-CM

## 2021-12-20 LAB
ALBUMIN SERPL-MCNC: 2.9 G/DL (ref 3.4–5)
ALP SERPL-CCNC: 79 U/L (ref 40–150)
ALT SERPL W P-5'-P-CCNC: 32 U/L (ref 0–50)
ANION GAP SERPL CALCULATED.3IONS-SCNC: 5 MMOL/L (ref 3–14)
AST SERPL W P-5'-P-CCNC: 25 U/L (ref 0–45)
BASOPHILS # BLD AUTO: 0 10E3/UL (ref 0–0.2)
BASOPHILS NFR BLD AUTO: 1 %
BILIRUB DIRECT SERPL-MCNC: <0.1 MG/DL (ref 0–0.2)
BILIRUB SERPL-MCNC: 0.4 MG/DL (ref 0.2–1.3)
BUN SERPL-MCNC: 17 MG/DL (ref 7–30)
CALCIUM SERPL-MCNC: 8.8 MG/DL (ref 8.5–10.1)
CHLORIDE BLD-SCNC: 101 MMOL/L (ref 94–109)
CO2 SERPL-SCNC: 29 MMOL/L (ref 20–32)
CREAT SERPL-MCNC: 0.46 MG/DL (ref 0.52–1.04)
EOSINOPHIL # BLD AUTO: 0.1 10E3/UL (ref 0–0.7)
EOSINOPHIL NFR BLD AUTO: 4 %
ERYTHROCYTE [DISTWIDTH] IN BLOOD BY AUTOMATED COUNT: 14.2 % (ref 10–15)
FASTING STATUS PATIENT QL REPORTED: NORMAL
GFR SERPL CREATININE-BSD FRML MDRD: >90 ML/MIN/1.73M2
GLUCOSE BLD-MCNC: 157 MG/DL (ref 70–99)
HCT VFR BLD AUTO: 28.5 % (ref 35–47)
HGB BLD-MCNC: 9.1 G/DL (ref 11.7–15.7)
HOLD SPECIMEN: NORMAL
IMM GRANULOCYTES # BLD: 0 10E3/UL
IMM GRANULOCYTES NFR BLD: 1 %
LYMPHOCYTES # BLD AUTO: 1.1 10E3/UL (ref 0.8–5.3)
LYMPHOCYTES NFR BLD AUTO: 36 %
MAGNESIUM SERPL-MCNC: 2.1 MG/DL (ref 1.6–2.3)
MCH RBC QN AUTO: 28.9 PG (ref 26.5–33)
MCHC RBC AUTO-ENTMCNC: 31.9 G/DL (ref 31.5–36.5)
MCV RBC AUTO: 91 FL (ref 78–100)
MONOCYTES # BLD AUTO: 0.6 10E3/UL (ref 0–1.3)
MONOCYTES NFR BLD AUTO: 19 %
NEUTROPHILS # BLD AUTO: 1.1 10E3/UL (ref 1.6–8.3)
NEUTROPHILS NFR BLD AUTO: 39 %
NRBC # BLD AUTO: 0 10E3/UL
NRBC BLD AUTO-RTO: 0 /100
PHOSPHATE SERPL-MCNC: 3.9 MG/DL (ref 2.5–4.5)
PLATELET # BLD AUTO: 432 10E3/UL (ref 150–450)
POTASSIUM BLD-SCNC: 3.6 MMOL/L (ref 3.4–5.3)
PROT SERPL-MCNC: 6.8 G/DL (ref 6.8–8.8)
RBC # BLD AUTO: 3.15 10E6/UL (ref 3.8–5.2)
SODIUM SERPL-SCNC: 135 MMOL/L (ref 133–144)
TRIGL SERPL-MCNC: 69 MG/DL
WBC # BLD AUTO: 2.9 10E3/UL (ref 4–11)

## 2021-12-20 PROCEDURE — 84478 ASSAY OF TRIGLYCERIDES: CPT | Performed by: OBSTETRICS & GYNECOLOGY

## 2021-12-20 PROCEDURE — 80053 COMPREHEN METABOLIC PANEL: CPT | Performed by: OBSTETRICS & GYNECOLOGY

## 2021-12-20 PROCEDURE — 83735 ASSAY OF MAGNESIUM: CPT | Performed by: OBSTETRICS & GYNECOLOGY

## 2021-12-20 PROCEDURE — 84100 ASSAY OF PHOSPHORUS: CPT | Performed by: OBSTETRICS & GYNECOLOGY

## 2021-12-20 PROCEDURE — 82248 BILIRUBIN DIRECT: CPT | Performed by: OBSTETRICS & GYNECOLOGY

## 2021-12-20 PROCEDURE — 85025 COMPLETE CBC W/AUTO DIFF WBC: CPT | Performed by: OBSTETRICS & GYNECOLOGY

## 2021-12-21 ENCOUNTER — HOME INFUSION (PRE-WILLOW HOME INFUSION) (OUTPATIENT)
Dept: PHARMACY | Facility: CLINIC | Age: 41
End: 2021-12-21
Payer: COMMERCIAL

## 2021-12-24 ENCOUNTER — HOME INFUSION (PRE-WILLOW HOME INFUSION) (OUTPATIENT)
Dept: PHARMACY | Facility: CLINIC | Age: 41
End: 2021-12-24
Payer: COMMERCIAL

## 2021-12-25 ENCOUNTER — HOME INFUSION (PRE-WILLOW HOME INFUSION) (OUTPATIENT)
Dept: PHARMACY | Facility: CLINIC | Age: 41
End: 2021-12-25
Payer: COMMERCIAL

## 2021-12-27 ENCOUNTER — HOME INFUSION (PRE-WILLOW HOME INFUSION) (OUTPATIENT)
Dept: PHARMACY | Facility: CLINIC | Age: 41
End: 2021-12-27

## 2021-12-27 ENCOUNTER — LAB REQUISITION (OUTPATIENT)
Dept: LAB | Facility: CLINIC | Age: 41
End: 2021-12-27
Payer: COMMERCIAL

## 2021-12-27 DIAGNOSIS — E43 UNSPECIFIED SEVERE PROTEIN-CALORIE MALNUTRITION (H): ICD-10-CM

## 2021-12-27 LAB
ALBUMIN SERPL-MCNC: 2.6 G/DL (ref 3.4–5)
ALBUMIN SERPL-MCNC: 2.7 G/DL (ref 3.4–5)
ALP SERPL-CCNC: 121 U/L (ref 40–150)
ALP SERPL-CCNC: 124 U/L (ref 40–150)
ALT SERPL W P-5'-P-CCNC: 19 U/L (ref 0–50)
ALT SERPL W P-5'-P-CCNC: 21 U/L (ref 0–50)
ANION GAP SERPL CALCULATED.3IONS-SCNC: 5 MMOL/L (ref 3–14)
ANION GAP SERPL CALCULATED.3IONS-SCNC: 7 MMOL/L (ref 3–14)
AST SERPL W P-5'-P-CCNC: 20 U/L (ref 0–45)
AST SERPL W P-5'-P-CCNC: 21 U/L (ref 0–45)
BASOPHILS # BLD AUTO: 0.1 10E3/UL (ref 0–0.2)
BASOPHILS NFR BLD AUTO: 1 %
BILIRUB DIRECT SERPL-MCNC: 0.1 MG/DL (ref 0–0.2)
BILIRUB SERPL-MCNC: 0.4 MG/DL (ref 0.2–1.3)
BUN SERPL-MCNC: 18 MG/DL (ref 7–30)
BUN SERPL-MCNC: 19 MG/DL (ref 7–30)
CALCIUM SERPL-MCNC: 8.8 MG/DL (ref 8.5–10.1)
CALCIUM SERPL-MCNC: 8.8 MG/DL (ref 8.5–10.1)
CHLORIDE BLD-SCNC: 99 MMOL/L (ref 94–109)
CHLORIDE BLD-SCNC: 99 MMOL/L (ref 94–109)
CO2 SERPL-SCNC: 29 MMOL/L (ref 20–32)
CO2 SERPL-SCNC: 29 MMOL/L (ref 20–32)
CREAT SERPL-MCNC: 0.46 MG/DL (ref 0.52–1.04)
CREAT SERPL-MCNC: 0.49 MG/DL (ref 0.52–1.04)
EOSINOPHIL # BLD AUTO: 0.1 10E3/UL (ref 0–0.7)
EOSINOPHIL NFR BLD AUTO: 1 %
ERYTHROCYTE [DISTWIDTH] IN BLOOD BY AUTOMATED COUNT: 14.6 % (ref 10–15)
FASTING STATUS PATIENT QL REPORTED: NORMAL
GFR SERPL CREATININE-BSD FRML MDRD: >90 ML/MIN/1.73M2
GFR SERPL CREATININE-BSD FRML MDRD: >90 ML/MIN/1.73M2
GLUCOSE BLD-MCNC: 197 MG/DL (ref 70–99)
GLUCOSE BLD-MCNC: 197 MG/DL (ref 70–99)
HCT VFR BLD AUTO: 29.3 % (ref 35–47)
HGB BLD-MCNC: 9.4 G/DL (ref 11.7–15.7)
IMM GRANULOCYTES # BLD: 0.1 10E3/UL
IMM GRANULOCYTES NFR BLD: 1 %
LYMPHOCYTES # BLD AUTO: 1.4 10E3/UL (ref 0.8–5.3)
LYMPHOCYTES NFR BLD AUTO: 17 %
MAGNESIUM SERPL-MCNC: 1.9 MG/DL (ref 1.6–2.3)
MCH RBC QN AUTO: 28.6 PG (ref 26.5–33)
MCHC RBC AUTO-ENTMCNC: 32.1 G/DL (ref 31.5–36.5)
MCV RBC AUTO: 89 FL (ref 78–100)
MONOCYTES # BLD AUTO: 0.7 10E3/UL (ref 0–1.3)
MONOCYTES NFR BLD AUTO: 8 %
NEUTROPHILS # BLD AUTO: 6.2 10E3/UL (ref 1.6–8.3)
NEUTROPHILS NFR BLD AUTO: 72 %
NRBC # BLD AUTO: 0 10E3/UL
NRBC BLD AUTO-RTO: 0 /100
PHOSPHATE SERPL-MCNC: 4 MG/DL (ref 2.5–4.5)
PLATELET # BLD AUTO: 367 10E3/UL (ref 150–450)
POTASSIUM BLD-SCNC: 4.4 MMOL/L (ref 3.4–5.3)
POTASSIUM BLD-SCNC: 4.7 MMOL/L (ref 3.4–5.3)
PROT SERPL-MCNC: 6.4 G/DL (ref 6.8–8.8)
PROT SERPL-MCNC: 6.8 G/DL (ref 6.8–8.8)
RBC # BLD AUTO: 3.29 10E6/UL (ref 3.8–5.2)
SODIUM SERPL-SCNC: 133 MMOL/L (ref 133–144)
SODIUM SERPL-SCNC: 135 MMOL/L (ref 133–144)
TRIGL SERPL-MCNC: 134 MG/DL
WBC # BLD AUTO: 8.5 10E3/UL (ref 4–11)

## 2021-12-27 PROCEDURE — 84155 ASSAY OF PROTEIN SERUM: CPT | Performed by: OBSTETRICS & GYNECOLOGY

## 2021-12-27 PROCEDURE — 84478 ASSAY OF TRIGLYCERIDES: CPT | Performed by: OBSTETRICS & GYNECOLOGY

## 2021-12-27 PROCEDURE — 83735 ASSAY OF MAGNESIUM: CPT | Performed by: OBSTETRICS & GYNECOLOGY

## 2021-12-27 PROCEDURE — 84100 ASSAY OF PHOSPHORUS: CPT | Performed by: OBSTETRICS & GYNECOLOGY

## 2021-12-27 PROCEDURE — 85014 HEMATOCRIT: CPT | Performed by: OBSTETRICS & GYNECOLOGY

## 2021-12-27 PROCEDURE — 82248 BILIRUBIN DIRECT: CPT | Performed by: OBSTETRICS & GYNECOLOGY

## 2021-12-27 NOTE — PROGRESS NOTES
This is a recent snapshot of the patient's [facility] medical record.  For current drug dose and complete information and questions, call [phone] or In Basket pool, [facility] home infusion ([id number])  CSN Number:  [id number]

## 2021-12-27 NOTE — PROGRESS NOTES
This is a recent snapshot of the patient's [facility] medical record.  For current drug dose and complete information and questions, call [phone] or In Basket pool, [facility] home infusion ([id number])  CSN Number:  [id number]

## 2021-12-28 ENCOUNTER — HOME INFUSION (PRE-WILLOW HOME INFUSION) (OUTPATIENT)
Dept: PHARMACY | Facility: CLINIC | Age: 41
End: 2021-12-28
Payer: COMMERCIAL

## 2021-12-28 ENCOUNTER — TELEPHONE (OUTPATIENT)
Dept: ORTHOPEDICS | Facility: CLINIC | Age: 41
End: 2021-12-28
Payer: COMMERCIAL

## 2021-12-28 ENCOUNTER — TELEPHONE (OUTPATIENT)
Dept: ENDOCRINOLOGY | Facility: CLINIC | Age: 41
End: 2021-12-28

## 2021-12-28 NOTE — TELEPHONE ENCOUNTER
Hello!  The nurse was able to send me screen shots from her [employer] Ronn alma, so here is more data:    12/25/21:  2:13   6:09AM 350  7:41AM 250  10:59AM 211  12:47PM 223  1:45   3:07   5:03PM  312  6:31PM 214  6:55   6:31   10:15     12/26/21  9:06AM 272  12:29   3:26 PM 69  3:46   5:33   8:27   9:30   10:38   11:14     12/27/21  6:55AM 272  8:29AM 277  2:04   6:14   8:54   10:00PM 307    12/28/21  10:25AM 329  10:44AM 334  2:24   3:00PM 275

## 2021-12-28 NOTE — TELEPHONE ENCOUNTER
RN send fax back to [facility]. This patient is not a patient of Dr. [facility].    Sherlyn Blandon RN

## 2021-12-28 NOTE — PROGRESS NOTES
This is a recent snapshot of the patient's [facility] medical record.  For current drug dose and complete information and questions, call [phone] or In Basket pool, [facility]v home infusion ([id number])  CSN Number:  [id number]

## 2021-12-28 NOTE — TELEPHONE ENCOUNTER
Ronnell Pratt,  I am reaching out regarding BGs for Shira since she was discharged home on TPN 12/18/21    Discharge TPN formula included 200 gm dextrose with 28 units of regular human insulin (0.14 units/gm carb).    TPN was cycled from 24 hrs to 22 hrs with 1 hr taper up and down on 12/23/21.    Patient called 12/24/21 to report BGs were running low.  Dropped to 53 overnight with TPN infusing, all other readings 80-low 100s.  I instructed her at that time to reduce her Lantus dose from 6 units to 3 units for one dose that day and then decrease insulin added to TPN from 24 units to 20 units (0.14 to 0.1 units per gram carb).     Follow up call to patient on 12/25/21:  12/24: 12:20pm 195; 3:15 pm 245; 6:20pm 167   12/25: 7:40am 250; 11:00am 211  Patient has been using her ss to correct for high values which has been effective. She will resume daily lantus at 6 units today 12/25/21      When I called patient today and spoke to her , I was provided the following:  TPN is generally being infused 4/5PM to 2/3PM daily  7AM  = 272  830AM = 277  205PM = 314  615PM =  210  9PM = 327  10PM = 307    Do you have any suggestions for adjustment of either her TPN insulin or Lantus? I was leaning towards increasing the TPN insulin to at least 24 units (0.12 units/gm carb).      The family would also like to continue cycling the TPN, dropping from 22 hrs to 20 hrs.  I was thinking of moving forward with 20 hrs, but extending the taper down from 1 hr to 2 hrs.      Thank you for your help with this!    Shira Vargas, PharmD [facility]   Ph: [phone]  Fax: [phone]

## 2021-12-29 ENCOUNTER — HOME INFUSION (PRE-WILLOW HOME INFUSION) (OUTPATIENT)
Dept: PHARMACY | Facility: CLINIC | Age: 41
End: 2021-12-29
Payer: COMMERCIAL

## 2021-12-29 ENCOUNTER — PATIENT OUTREACH (OUTPATIENT)
Dept: GASTROENTEROLOGY | Facility: CLINIC | Age: 41
End: 2021-12-29
Payer: COMMERCIAL

## 2021-12-29 NOTE — TELEPHONE ENCOUNTER
Reached out patient to check on feeding tube and follow up on ttags, which can now be removed if still present.  Called patient, left message.    ML

## 2021-12-31 ENCOUNTER — HOME INFUSION (PRE-WILLOW HOME INFUSION) (OUTPATIENT)
Dept: PHARMACY | Facility: CLINIC | Age: 41
End: 2021-12-31
Payer: COMMERCIAL

## 2022-01-03 ENCOUNTER — HOME INFUSION (PRE-WILLOW HOME INFUSION) (OUTPATIENT)
Dept: PHARMACY | Facility: CLINIC | Age: 42
End: 2022-01-03
Payer: COMMERCIAL

## 2022-01-03 ENCOUNTER — LAB REQUISITION (OUTPATIENT)
Dept: LAB | Facility: CLINIC | Age: 42
End: 2022-01-03
Payer: COMMERCIAL

## 2022-01-03 DIAGNOSIS — E43 UNSPECIFIED SEVERE PROTEIN-CALORIE MALNUTRITION (H): ICD-10-CM

## 2022-01-03 LAB
ALBUMIN SERPL-MCNC: 2.4 G/DL (ref 3.4–5)
ALP SERPL-CCNC: 228 U/L (ref 40–150)
ALT SERPL W P-5'-P-CCNC: 46 U/L (ref 0–50)
ANION GAP SERPL CALCULATED.3IONS-SCNC: 6 MMOL/L (ref 3–14)
AST SERPL W P-5'-P-CCNC: 23 U/L (ref 0–45)
BASOPHILS # BLD MANUAL: 0 10E3/UL (ref 0–0.2)
BASOPHILS NFR BLD MANUAL: 0 %
BILIRUB DIRECT SERPL-MCNC: 0.3 MG/DL (ref 0–0.2)
BILIRUB SERPL-MCNC: 0.6 MG/DL (ref 0.2–1.3)
BUN SERPL-MCNC: 17 MG/DL (ref 7–30)
CALCIUM SERPL-MCNC: 8.9 MG/DL (ref 8.5–10.1)
CHLORIDE BLD-SCNC: 101 MMOL/L (ref 94–109)
CO2 SERPL-SCNC: 29 MMOL/L (ref 20–32)
CREAT SERPL-MCNC: 0.39 MG/DL (ref 0.52–1.04)
EOSINOPHIL # BLD MANUAL: 0 10E3/UL (ref 0–0.7)
EOSINOPHIL NFR BLD MANUAL: 0 %
ERYTHROCYTE [DISTWIDTH] IN BLOOD BY AUTOMATED COUNT: 13.4 % (ref 10–15)
FASTING STATUS PATIENT QL REPORTED: NORMAL
GFR SERPL CREATININE-BSD FRML MDRD: >90 ML/MIN/1.73M2
GLUCOSE BLD-MCNC: 273 MG/DL (ref 70–99)
HCT VFR BLD AUTO: 27.6 % (ref 35–47)
HGB BLD-MCNC: 8.9 G/DL (ref 11.7–15.7)
HOLD SPECIMEN: NORMAL
LYMPHOCYTES # BLD MANUAL: 0.6 10E3/UL (ref 0.8–5.3)
LYMPHOCYTES NFR BLD MANUAL: 11 %
MAGNESIUM SERPL-MCNC: 1.9 MG/DL (ref 1.6–2.3)
MCH RBC QN AUTO: 27.9 PG (ref 26.5–33)
MCHC RBC AUTO-ENTMCNC: 32.2 G/DL (ref 31.5–36.5)
MCV RBC AUTO: 87 FL (ref 78–100)
MONOCYTES # BLD MANUAL: 0 10E3/UL (ref 0–1.3)
MONOCYTES NFR BLD MANUAL: 0 %
NEUTROPHILS # BLD MANUAL: 5.3 10E3/UL (ref 1.6–8.3)
NEUTROPHILS NFR BLD MANUAL: 89 %
PHOSPHATE SERPL-MCNC: 2.9 MG/DL (ref 2.5–4.5)
PLAT MORPH BLD: ABNORMAL
PLATELET # BLD AUTO: 422 10E3/UL (ref 150–450)
POTASSIUM BLD-SCNC: 3.9 MMOL/L (ref 3.4–5.3)
PROT SERPL-MCNC: 6.9 G/DL (ref 6.8–8.8)
RBC # BLD AUTO: 3.19 10E6/UL (ref 3.8–5.2)
RBC MORPH BLD: ABNORMAL
SODIUM SERPL-SCNC: 136 MMOL/L (ref 133–144)
TRIGL SERPL-MCNC: 61 MG/DL
WBC # BLD AUTO: 5.9 10E3/UL (ref 4–11)

## 2022-01-03 PROCEDURE — 80053 COMPREHEN METABOLIC PANEL: CPT | Performed by: OBSTETRICS & GYNECOLOGY

## 2022-01-03 PROCEDURE — 84100 ASSAY OF PHOSPHORUS: CPT | Performed by: OBSTETRICS & GYNECOLOGY

## 2022-01-03 PROCEDURE — 84478 ASSAY OF TRIGLYCERIDES: CPT | Performed by: OBSTETRICS & GYNECOLOGY

## 2022-01-03 PROCEDURE — 85027 COMPLETE CBC AUTOMATED: CPT | Performed by: OBSTETRICS & GYNECOLOGY

## 2022-01-03 PROCEDURE — 83735 ASSAY OF MAGNESIUM: CPT | Performed by: OBSTETRICS & GYNECOLOGY

## 2022-01-03 PROCEDURE — 82248 BILIRUBIN DIRECT: CPT | Performed by: OBSTETRICS & GYNECOLOGY

## 2022-01-04 ENCOUNTER — HOME INFUSION (PRE-WILLOW HOME INFUSION) (OUTPATIENT)
Dept: PHARMACY | Facility: CLINIC | Age: 42
End: 2022-01-04
Payer: COMMERCIAL

## 2022-01-05 ENCOUNTER — MYC MEDICAL ADVICE (OUTPATIENT)
Dept: ENDOCRINOLOGY | Facility: CLINIC | Age: 42
End: 2022-01-05
Payer: COMMERCIAL

## 2022-01-05 ENCOUNTER — TELEPHONE (OUTPATIENT)
Dept: ENDOCRINOLOGY | Facility: CLINIC | Age: 42
End: 2022-01-05
Payer: COMMERCIAL

## 2022-01-05 ENCOUNTER — HOME INFUSION (PRE-WILLOW HOME INFUSION) (OUTPATIENT)
Dept: PHARMACY | Facility: CLINIC | Age: 42
End: 2022-01-05
Payer: COMMERCIAL

## 2022-01-05 DIAGNOSIS — Z79.4 DIABETES MELLITUS DUE TO UNDERLYING CONDITION WITHOUT COMPLICATION, WITH LONG-TERM CURRENT USE OF INSULIN (H): Primary | ICD-10-CM

## 2022-01-05 DIAGNOSIS — E08.9 DIABETES MELLITUS DUE TO UNDERLYING CONDITION WITHOUT COMPLICATION, WITH LONG-TERM CURRENT USE OF INSULIN (H): Primary | ICD-10-CM

## 2022-01-05 DIAGNOSIS — Z79.4 DIABETES MELLITUS DUE TO UNDERLYING CONDITION WITHOUT COMPLICATION, WITH LONG-TERM CURRENT USE OF INSULIN (H): ICD-10-CM

## 2022-01-05 DIAGNOSIS — E08.9 DIABETES MELLITUS DUE TO UNDERLYING CONDITION WITHOUT COMPLICATION, WITH LONG-TERM CURRENT USE OF INSULIN (H): ICD-10-CM

## 2022-01-05 RX ORDER — PEN NEEDLE, DIABETIC 32GX 5/32"
NEEDLE, DISPOSABLE MISCELLANEOUS
Qty: 600 EACH | Refills: 1 | Status: SHIPPED | OUTPATIENT
Start: 2022-01-05

## 2022-01-05 RX ORDER — PEN NEEDLE, DIABETIC 32GX 5/32"
NEEDLE, DISPOSABLE MISCELLANEOUS
Qty: 200 EACH | Refills: 11 | OUTPATIENT
Start: 2022-01-05 | End: 2022-01-05

## 2022-01-05 NOTE — TELEPHONE ENCOUNTER
Isha sent :Shira is completely out of needles and needs a new prescription for her  Ethel  pens sent to her [facility] pharmacy off [address] please. We look forward to meeting you soon. She was using a BD needle that was .23mm x 4 mm 32G.      Please respond to let us know you submitted so we can contact [facility].     Insulin pen needle (32 G x4 mm) 4mm x 32 G  misc  Last Written Prescription Date:  New prescription for [facility]  Last Fill Quantity: ~~,   # refills: ~  Last Office Visit : seen inpatient by Dr Riuz  Future Office visit:  1/6/22 Marques  Routing refill request to provider for review/approval because:  Insulin pen needle is new prescription:Last Office Visit : seen inpatient by Dr Ruiz  Future Office visit:  1/6/22 Marques       USES HUMALOG KWIKPEN

## 2022-01-05 NOTE — TELEPHONE ENCOUNTER
----- Message from Shira Vargas PharmD sent at 2022  9:31 AM CST -----  Regarding: RE: follow up on blood sugars  Hello!  Please call Shira's , Bandar, at [phone] to set up the appointment.  I left him a message this morning saying someone would be calling.    I will go ahead and dispense the TPN for now without adjusting the insulin and will follow along with what happens at the appointment tomorrow.   Thank you!  -Maureen  ----- Message -----  From: Santa Mohan PA-C  Sent: 2022   3:34 PM CST  To: Shira Vargas PharmD, Cecelia Ruiz MD, #  Subject: RE: follow up on blood sugars                    Thank you for covering while I was out, Dr. Ruiz.      Since I have never met this patient before, I think it would be best to schedule an appointment.  I can add her on as a virtual appointment this Thursday,  at 10:30.  Schedulers- can you please reach out to patient to see if this works?    Thank you!  Santa  ----- Message -----  From: Shira Vargas, Anitra  Sent: 2022   2:33 PM CST  To: Santa Mohan PA-C, Cecelia Ruiz MD, #  Subject: follow up on blood sugars                        Hello!  Dr. Ruiz advised regarding BG for this patient last week while covering on-call.  Is Santa available this week to weigh in?    BG continues to run high.  Patient was wondering if perhaps her Lantus is , so has gotten a new refill to try today.   She did have chemo yesterday, with 10 mg IV dex and 4 mg PO dex given the night prior.  Verified they did give the NPH insulin as advised below.   Lantus 6 units daily  TPN with 200 gm dextrose and 26 units regular insulin over 20hrs   22: 193 @ 0121, 194 @ 1729, 209 @ 2342  1/3/22: 243 @ 0032, 265 @ 0118, 352 @ 0802, 298 @ 1219, 347 @ 1617, 236 @ 1810, 328 @2226, 354 @ 2317, 340 @4502  22: 333 @0028, 303 @0057, 270 @0227,  296 @0444, 376 @0700, 376 @0800, 384 @1024    Her clinic follow up is scheduled , do you  think she should be seen sooner than that?      I believe her chemo will now be every other week on Monday.  The first 2 treatments she will have the PO dex the Sunday prior.     Thank you!  Shira Vargas PharmD [facility]   Ph: [phone]  Fax: [phone]  ----- Message -----  From: Cecelia Ruiz MD  Sent: 12/31/2021   7:00 AM CST  To: Shira MULLINS Anitra Vargas  Subject: RE: chemo dexamethasone                          I'm glad those blood glucose numbes are better.    The patient most likely will require at least 7 units of NPH for this amont of Dexamethasone, however to be on the safe side, she can take 5 units with the oral dose on e Sunday 1/2 , and increase to 7 units with the  IV on Monday.    Cecelia Ruiz MD  Staff Physician  Division of Endocrinology  [facility]  Pager [phone]    ----- Message -----  From: Shira Vargas PharmD  Sent: 12/30/2021   2:54 PM CST  To: Cecelia Ruiz MD  Subject: RE: chemo dexamethasone                          Hello Dr. Ruiz,    Thank you, yes I did send the message regarding the insulin with Shira's upcoming chemo.     She has been instructed to take 4mg PO Dexamethasone on Sunday 1/2, and then will get 10 mg IV dex with chemo on Monday 1/3.  Should she take the NPH insulin with both the PO and IV dex doses?    The family has requested a new Rx, it can be sent to their pharmacy listed in Epic.     I also obtained more BG readings from the last 24 hrs.  No new readings while TPN not infusing.  She started a new bag last night at 8PM (200gm dex with 26 units reg insulin, 0.13 units/gm carb)    12/29/21:  11:17   2:23   8:39  (tpn started at 8pm with 1 hr taper up)  11:06     12/30/21  1:02   1:30   4:50   10:03   2:23     Her TPN infusion is currently 22 hrs, I was hoping to further cycle to 20 hrs to try and complete more of the infusion before her treatment  at 9AM.  I instructed her  to start the taper down early, 1 hr prior to the appointment and then stop the pump at the appointment.  Treatment will take about 3 hrs, then start a new TPN bag when she gets home.      Is there specific timing to consider with dosing the NPH insulin knowing that she will be off the TPN pump for a few hrs?      Based on the BG readings above, do you still approve of the 7 units NPH dose?    Do you have any recommendations for adjustment to the insulin in her TPN, or continue with the 26 units?    I'm sorry, I know this is a lot of questions to be asking of a on-call provider.  I truly appreciate your help!      Thank you!    -Maureen  ----- Message -----  From: Cecelia Ruiz MD  Sent: 12/30/2021  12:14 PM CST  To: Shira Vargas, PharmD  Subject: chemo dexamethasone                              Ronnell Silva,    I think you sent me the question regarding upcoming chemo with dexamethasone.  Reviewing the chart, prior recommendation were to take NPH 7 units at time of Dexamethasone dose. Considering her insulin needs otherwise, I would recommend that dose.  Please let me know, if I should call in that medication or if you did not send me this message.    Thanks    Cecelia Ruiz MD  Staff Physician  Division of Endocrinology  [facility]  Pager [phone]    ----- Message -----  From: Cecelia Ruiz MD  Sent: 12/29/2021  11:00 AM CST  To: Hema oTrrez LPN, #  Subject: RE: follow up?                                   Ronnell Silva,    I'm the on-call attending receiving your message just now. I'm going to be on call until January 2nd.    I agree with going up on the insulin (regular) in the TPN. Considering the recent lows, I would only go from 20 -> 24 units assuming carb content remains the same. This might need to be further adjusted.  Continue current lantus dose.   If you would need me urgently, feel free to page me (see below)    Cecelia Ruiz  MD  Staff Physician  Division of Endocrinology  [facility]  Pager [phone]    ----- Message -----  From: Hema Torrez LPN  Sent: 12/29/2021   9:21 AM CST  To: Shira Vargas, KvngD, Cecelia Ruiz MD  Subject: FW: follow up?                                   Dr. Mohan is out of office.  Dr. Ruiz is the covering provider.   I have added her to this message.  Thank you.  KYLE Boyer  [facility] Nursing Team  ----- Message -----  From: Shira Vargas, Anitra  Sent: 12/29/2021   9:18 AM CST  To: [facility]  Subject: follow up?                                       Hello!  I am not sure if I am reaching out to the correct group, but I had sent a follow up telephone call to LUCIO Grant but have not gotten a response yet. Can you tell me if they are currently available to message? Or is there another provider I can reach out to?    This patient was discharged home on TPN and is struggling with BG control.    Thank you for your assistance!    -Maureen Vargas, PharmD [facility]   Ph: [phone]  Fax: [phone]

## 2022-01-05 NOTE — PROGRESS NOTES
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  She had been followed by our inpatient diabetes team.  She has been on TPN since 12/14, which is now on a 20 hour cycle (TPN to provide 200g dextrose).  They tried an 18 hour cycle, but they have been experiencing some errors- air in line. Not a consistent time of day for cycling.  She is not eating.  Just having a little bit of tea.     Since hospital discharge, she has been doing ok.  Not feeling great today.  Going to see [facility] at 11:30.     Her current insulin regimen is as follows:      Plan:   - Glargine 6 units every AM (even if TPN off).  To keep out of DKA. This is given 1 hour prior to the end of her TPN cycle.  - Humalog 1:20g CHO with meals, snacks (not currently eating, on TPN)  - Humalog medium resistance sliding scale insulin Q 4 hours  - Regular insulin 26 units in TPN (0.13 units/g dextrose)    - BG monitoring Q 4 hours  - NPH dosing below:      She is receiving chemo with dexamethasone every Monday (last treatment 1/3) x 3 weeks, then off 1 week. She takes dexamethasone the night before.  She takes NPH 7 units with 4 mg dexamethasone on Sunday PM and  5 units NPH Monday AM (before chemo).  Her chemo was given on 1/3 and will plan to have her next treatments 1/10, 1/17, skip 1/24, 1/31, etc. This past week, she was using old NPH (>2 weeks old) and she wondered if  that is why her glucose was so high.     Recent glucose is as follows (screenshots from her Ronn sensor):                        Diabetes Control:   Lab Results   Component Value Date    A1C 7.1 12/07/2021     No past medical history on file.    Past Surgical History:   Procedure Laterality Date     ENDOSCOPIC INSERTION TUBE GASTROSTOMY N/A 12/15/2021    Procedure: INSERTION, PEG TUBE;  Surgeon: August Arrieta MD;  Location:  OR       No family history on file.    Social History     Socioeconomic History     Marital status:      Spouse name: Not on file     Number of children: Not on file     Years of education: Not on file     Highest education level: Not on file   Occupational History     Not on file   Tobacco Use     Smoking status: Never Smoker     Smokeless tobacco: Never Used   Substance and Sexual Activity     Alcohol use: Not on file     Drug use: Not on file     Sexual activity: Not on file   Other Topics Concern     Parent/sibling w/ CABG, MI or angioplasty before 65F 55M? Not Asked   Social History Narrative     Not on file     Social Determinants of Health     Financial Resource Strain: Not on file   Food Insecurity: Not on file   Transportation Needs: Not on file   Physical Activity: Not on file   Stress: Not on file   Social Connections: Not on file   Intimate Partner Violence: Not on file   Housing Stability: Not on file       Current Outpatient Medications   Medication     acetaminophen (TYLENOL) 325 MG tablet     Glucagon 3 MG/DOSE POWD     glucose 40 % (400 mg/mL) gel     insulin glargine (LANTUS PEN) 100 UNIT/ML pen     insulin lispro (HUMALOG KWIKPEN) 100 UNIT/ML (1 unit dial) KWIKPEN     insulin lispro (HUMALOG KWIKPEN) 100 UNIT/ML (1 unit dial) KWIKPEN     insulin  UNIT/ML injection     insulin pen needle (BD АЛЕКСАНДР U/F) 32G X 4 MM miscellaneous     lipids (INTRALIPID) 20 % infusion     LORazepam (ATIVAN) 0.5 MG tablet     ondansetron (ZOFRAN-ODT) 4 MG ODT tab      pantoprazole (PROTONIX) 40 MG EC tablet     parenteral nutrition - PTA/DISCHARGE ORDER     senna-docusate (SENOKOT-S/PERICOLACE) 8.6-50 MG tablet     VITAMIN D PO     No current facility-administered medications for this visit.        No Known Allergies    Physical Exam  There were no vitals taken for this visit.  GENERAL: no distress  PSYCH: mentation appears normal, affect normal/bright, judgement and insight intact, normal speech    RESULTS  Lab Results   Component Value Date    A1C 7.1 (H) 12/07/2021       No results found for: TSH, T4    ALT   Date Value Ref Range Status   01/03/2022 46 0 - 50 U/L Final   12/27/2021 19 0 - 50 U/L Final   12/27/2021 21 0 - 50 U/L Final   ]    Recent Labs   Lab Test 01/03/22  1740 12/27/21  0945   TRIG 61 134       Lab Results   Component Value Date     01/03/2022      Lab Results   Component Value Date    POTASSIUM 3.9 01/03/2022     Lab Results   Component Value Date    CHLORIDE 101 01/03/2022     Lab Results   Component Value Date    JORGE L 8.9 01/03/2022     Lab Results   Component Value Date    CO2 29 01/03/2022     Lab Results   Component Value Date    BUN 17 01/03/2022     Lab Results   Component Value Date    CR 0.39 01/03/2022       GFR Estimate   Date Value Ref Range Status   01/03/2022 >90 >60 mL/min/1.73m2 Final     Comment:     Effective December 21, 2021 eGFRcr in adults is calculated using the 2021 CKD-EPI creatinine equation which includes age and gender (Dorothy et al., NEJM, DOI: 10.1056/ZIOJsm4919130)   12/27/2021 >90 >60 mL/min/1.73m2 Final     Comment:     Effective December 21, 2021 eGFRcr in adults is calculated using the 2021 CKD-EPI creatinine equation which includes age and gender (Dorothy et al., NEJ, DOI: 10.1056/VSQAgj0400802)   12/27/2021 >90 >60 mL/min/1.73m2 Final     Comment:     Effective December 21, 2021 eGFRcr in adults is calculated using the 2021 CKD-EPI creatinine equation which includes age and gender (Dorothy et al., NEJ, DOI:  10.1056/AHDEyu9414828)     No results found for: GFRESTBLACK    No results found for: MICROL  No results found for: MICROALBUMIN  Lab Results   Component Value Date    GADAB <5.0 12/08/2021    ISCAB <1:4 12/08/2021       No results found for: B12]    Most recent eye exam date: : Not Found     Assessment/Plan:     1.  Type 1 diabetes- diagnosed 11/2021.  This was induced by pembrolizumab.  She is currently undergoing chemotherapy with dexamethasone the night before and she has been experiencing hyperglycemia.  We reviewed the various insulins she is on and the mechanism of action. Advised patient to reach out with any changes to her chemo schedule or TPN.  We came up with the following plan (instructions given to patient):   Please increase glargine (lantus)  to 8 units (was 6 units).      Please increase regular insulin in TPN to 28 units (0.14 units/g dextrose).  This is increased from 26 units.    Please continue NPH insulin 7 units on Sunday nights with dexamethasone and 5 units AM of chemo (Mondays)    Please continue taking humalog correction every 4 hours if you are running high (follow correction scale below):    Daytime:   Do Not give Correction Insulin if Pre-Meal BG less than 140.   For Pre-Meal  - 189 give 1 unit.   For Pre-Meal  - 239 give 2 units.   For Pre-Meal  - 289 give 3 units.   For Pre-Meal  - 339 give 4 units.   For Pre-Meal - 399 give 5 units.   For Pre-Meal -449 give 6 units  For Pre-Meal BG greater than or equal to 450 give 7 units.      Bedtime:   Do Not give Bedtime Correction Insulin if BG less than 200.   For  - 249 give 1 units.   For  - 299 give 2 units.   For  - 349 give 3 units.   For  -399 give 4 units.   For BG greater than or equal to 400 give 5 units.    I have invited you to share david data with our clinic.  Please accept invitation so I can review your data if you have concerns.       Please send me an update in 1 week  through [employer], sooner if you continue having glucose readings >250 or start having values less than 70 mg/dL.      I have refilled your medications, testing supplies, etc.  Please check ketones if glucose is high and you are feeling unwell (nausea, vomiting, etc).  Please notify the clinic if ketones are moderate or high.     Please reach out with any changes to TPN or chemo.    If you have concerns, please send me a [employer] message M-Th, call the clinic at [phone], or call [phone] after hours/weekends and ask to speak with the endocrinologist on call.        2.  F/U in 3 months or prn.  I am happy to adjust insulin remotely through [employer]Time as needed.  Plan discussed with pharmacist, patient and patient's .    69 minutes spent on the date of the encounter doing chart review, review of test results, review of continuous glucose sensor, interpretation of glucose data, patient visit and documentation, discussing plan with pharmacist, counseling/coordination of care, and discussion of follow up plan for worsening hyper and hypoglycemia.  The patient understood and is satisfied with today's visit.        Santa Mohan PA-C, MPAS   [facility]  Department of Medicine  Division of Endocrinology and Diabetes        Answers for HPI/ROS submitted by the patient on 1/6/2022  General Symptoms: Yes  Skin Symptoms: No  HENT Symptoms: No  EYE SYMPTOMS: No  HEART SYMPTOMS: No  LUNG SYMPTOMS: No  INTESTINAL SYMPTOMS: Yes  URINARY SYMPTOMS: No  GYNECOLOGIC SYMPTOMS: No  BREAST SYMPTOMS: No  SKELETAL SYMPTOMS: Yes  BLOOD SYMPTOMS: No  NERVOUS SYSTEM SYMPTOMS: No  MENTAL HEALTH SYMPTOMS: No  Fever: No  Loss of appetite: Yes  Weight loss: No  Weight gain: No  Fatigue: Yes  Night sweats: No  Chills: No  Increased stress: Yes  Excessive hunger: No  Excessive thirst: No  Feeling hot or cold when others believe the temperature is normal: No  Loss of height: No  Post-operative complications: No  Surgical site  pain: Yes  Hallucinations: No  Change in or Loss of Energy: Yes  Hyperactivity: No  Confusion: Yes  Heart burn or indigestion: No  Nausea: Yes  Vomiting: Yes  Abdominal pain: Yes  Bloating: Yes  Constipation: Yes  Diarrhea: No  Blood in stool: No  Black stools: No  Rectal or Anal pain: No  Fecal incontinence: No  Yellowing of skin or eyes: No  Vomit with blood: No  Change in stools: No  Back pain: Yes  Muscle aches: Yes  Neck pain: No  Swollen joints: No  Joint pain: No  Bone pain: No  Muscle cramps: No  Muscle weakness: Yes  Joint stiffness: No  Bone fracture: No

## 2022-01-05 NOTE — TELEPHONE ENCOUNTER
----- Message from Sravani Trotter RN sent at 1/5/2022 12:51 PM CST -----  Seen in patient 12/16/21 Dr Hickey  route to [place]

## 2022-01-06 ENCOUNTER — VIRTUAL VISIT (OUTPATIENT)
Dept: ENDOCRINOLOGY | Facility: CLINIC | Age: 42
End: 2022-01-06
Payer: COMMERCIAL

## 2022-01-06 ENCOUNTER — HOME INFUSION (PRE-WILLOW HOME INFUSION) (OUTPATIENT)
Dept: PHARMACY | Facility: CLINIC | Age: 42
End: 2022-01-06

## 2022-01-06 ENCOUNTER — TELEPHONE (OUTPATIENT)
Dept: ENDOCRINOLOGY | Facility: CLINIC | Age: 42
End: 2022-01-06

## 2022-01-06 DIAGNOSIS — Z79.4 DIABETES MELLITUS DUE TO UNDERLYING CONDITION WITHOUT COMPLICATION, WITH LONG-TERM CURRENT USE OF INSULIN (H): ICD-10-CM

## 2022-01-06 DIAGNOSIS — E10.65 TYPE 1 DIABETES MELLITUS WITH HYPERGLYCEMIA (H): Primary | ICD-10-CM

## 2022-01-06 DIAGNOSIS — E08.9 DIABETES MELLITUS DUE TO UNDERLYING CONDITION WITHOUT COMPLICATION, WITH LONG-TERM CURRENT USE OF INSULIN (H): ICD-10-CM

## 2022-01-06 PROBLEM — E10.9 DIABETES MELLITUS TYPE 1 (H): Status: ACTIVE | Noted: 2022-01-06

## 2022-01-06 PROCEDURE — 99205 OFFICE O/P NEW HI 60 MIN: CPT | Mod: 95 | Performed by: PHYSICIAN ASSISTANT

## 2022-01-06 RX ORDER — URINE ACETONE TEST STRIPS
STRIP MISCELLANEOUS
Qty: 25 STRIP | Refills: 3 | Status: SHIPPED | OUTPATIENT
Start: 2022-01-06

## 2022-01-06 ASSESSMENT — ENCOUNTER SYMPTOMS
WEIGHT LOSS: 0
DECREASED APPETITE: 1
NAUSEA: 1
BLOOD IN STOOL: 0
ARTHRALGIAS: 0
CONSTIPATION: 1
WEIGHT GAIN: 0
ABDOMINAL PAIN: 1
VOMITING: 1
ALTERED TEMPERATURE REGULATION: 0
JAUNDICE: 0
MYALGIAS: 1
BACK PAIN: 1
INCREASED ENERGY: 1
FATIGUE: 1
HEARTBURN: 0
CHILLS: 0
DIARRHEA: 0
MUSCLE WEAKNESS: 1
STIFFNESS: 0
FEVER: 0
MUSCLE CRAMPS: 0
HALLUCINATIONS: 0
POLYPHAGIA: 0
BLOATING: 1
RECTAL PAIN: 0
JOINT SWELLING: 0
NIGHT SWEATS: 0
NECK PAIN: 0
POLYDIPSIA: 0
BOWEL INCONTINENCE: 0

## 2022-01-06 NOTE — TELEPHONE ENCOUNTER
Called to disuss ttags. Per spouse 1 ttag remains. Walked spouse through removal of remaining ttag, states he's comfortable removing at home. Says tube is working fine. Ended call to take another call with patients oncologist.    ML

## 2022-01-06 NOTE — TELEPHONE ENCOUNTER
Attempted to reach patient to schedule follow up in the Endocrinology Clinic. No answer, LM on VM to call office back.    Schedule with LUCIO Grant in 3 months around 4/6/22. Edelmira Gupta on 1/6/2022 at 2:05 PM

## 2022-01-06 NOTE — LETTER
1/6/2022       RE: Shira Joy  [address]  [facility] [address]     Dear Colleague,    Thank you for referring your patient, Shira Joy, to the [facility]. Please see a copy of my visit note below.    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  She had been followed by our inpatient diabetes team.  She has been on TPN since 12/14, which is now on a 20 hour cycle (TPN to provide 200g dextrose).  They tried an 18 hour cycle, but they have been experiencing some errors- air in line. Not a consistent time of day for cycling.  She is not eating.  Just having a little bit of tea.     Since hospital discharge, she has been doing ok.  Not feeling great today.  Going to see [facility] at 11:30.     Her current insulin regimen is as follows:      Plan:   - Glargine 6 units every AM (even if TPN off).  To keep out of DKA. This is given 1 hour prior to the end of her TPN cycle.  - Humalog 1:20g CHO with meals, snacks (not currently eating, on TPN)  - Humalog medium resistance sliding scale insulin Q 4 hours  - Regular insulin 26 units in TPN (0.13 units/g dextrose)    - BG monitoring Q 4 hours  - NPH dosing below:      She is receiving chemo with dexamethasone every Monday (last treatment 1/3) x 3 weeks, then off 1 week. She takes  dexamethasone the night before.  She takes NPH 7 units with 4 mg dexamethasone on Sunday PM and  5 units NPH Monday AM (before chemo).  Her chemo was given on 1/3 and will plan to have her next treatments 1/10, 1/17, skip 1/24, 1/31, etc. This past week, she was using old NPH (>2 weeks old) and she wondered if that is why her glucose was so high.     Recent glucose is as follows (screenshots from her Ron[employer] sensor):                        Diabetes Control:   Lab Results   Component Value Date    A1C 7.1 12/07/2021     No past medical history on file.    Past Surgical History:   Procedure Laterality Date     ENDOSCOPIC INSERTION TUBE GASTROSTOMY N/A 12/15/2021    Procedure: INSERTION, PEG TUBE;  Surgeon: August Arrieta MD;  Location:  OR       No family history on file.    Social History     Socioeconomic History     Marital status:      Spouse name: Not on file     Number of children: Not on file     Years of education: Not on file     Highest education level: Not on file   Occupational History     Not on file   Tobacco Use     Smoking status: Never Smoker     Smokeless tobacco: Never Used   Substance and Sexual Activity     Alcohol use: Not on file     Drug use: Not on file     Sexual activity: Not on file   Other Topics Concern     Parent/sibling w/ CABG, MI or angioplasty before 65F 55M? Not Asked   Social History Narrative     Not on file     Social Determinants of Health     Financial Resource Strain: Not on file   Food Insecurity: Not on file   Transportation Needs: Not on file   Physical Activity: Not on file   Stress: Not on file   Social Connections: Not on file   Intimate Partner Violence: Not on file   Housing Stability: Not on file       Current Outpatient Medications   Medication     acetaminophen (TYLENOL) 325 MG tablet     Glucagon 3 MG/DOSE POWD     glucose 40 % (400 mg/mL) gel     insulin glargine (LANTUS PEN) 100 UNIT/ML pen     insulin lispro (HUMALOG KWIKPEN) 100 UNIT/ML (1 unit  dial) KWIKPEN     insulin lispro (HUMALOG KWIKPEN) 100 UNIT/ML (1 unit dial) KWIKPEN     insulin  UNIT/ML injection     insulin pen needle (BD АЛЕКСАНДР U/F) 32G X 4 MM miscellaneous     lipids (INTRALIPID) 20 % infusion     LORazepam (ATIVAN) 0.5 MG tablet     ondansetron (ZOFRAN-ODT) 4 MG ODT tab     pantoprazole (PROTONIX) 40 MG EC tablet     parenteral nutrition - PTA/DISCHARGE ORDER     senna-docusate (SENOKOT-S/PERICOLACE) 8.6-50 MG tablet     VITAMIN D PO     No current facility-administered medications for this visit.        No Known Allergies    Physical Exam  There were no vitals taken for this visit.  GENERAL: no distress  PSYCH: mentation appears normal, affect normal/bright, judgement and insight intact, normal speech    RESULTS  Lab Results   Component Value Date    A1C 7.1 (H) 12/07/2021       No results found for: TSH, T4    ALT   Date Value Ref Range Status   01/03/2022 46 0 - 50 U/L Final   12/27/2021 19 0 - 50 U/L Final   12/27/2021 21 0 - 50 U/L Final   ]    Recent Labs   Lab Test 01/03/22  1740 12/27/21  0945   TRIG 61 134       Lab Results   Component Value Date     01/03/2022      Lab Results   Component Value Date    POTASSIUM 3.9 01/03/2022     Lab Results   Component Value Date    CHLORIDE 101 01/03/2022     Lab Results   Component Value Date    JORGE L 8.9 01/03/2022     Lab Results   Component Value Date    CO2 29 01/03/2022     Lab Results   Component Value Date    BUN 17 01/03/2022     Lab Results   Component Value Date    CR 0.39 01/03/2022       GFR Estimate   Date Value Ref Range Status   01/03/2022 >90 >60 mL/min/1.73m2 Final     Comment:     Effective December 21, 2021 eGFRcr in adults is calculated using the 2021 CKD-EPI creatinine equation which includes age and gender (Dorothy hurley al., NEJM, DOI: 10.1056/UXYAix8943397)   12/27/2021 >90 >60 mL/min/1.73m2 Final     Comment:     Effective December 21, 2021 eGFRcr in adults is calculated using the 2021 CKD-EPI creatinine equation  which includes age and gender (Dorothy hurley al., [facility], DOI: 10.1056/WPISfk6766916)   12/27/2021 >90 >60 mL/min/1.73m2 Final     Comment:     Effective December 21, 2021 eGFRcr in adults is calculated using the 2021 CKD-EPI creatinine equation which includes age and gender (Dorothy hurley al., [facility], DOI: 10.1056/NRFKhz6229680)     No results found for: GFRESTBLACK    No results found for: MICROL  No results found for: MICROALBUMIN  Lab Results   Component Value Date    GADAB <5.0 12/08/2021    ISCAB <1:4 12/08/2021       No results found for: B12]    Most recent eye exam date: : Not Found     Assessment/Plan:     1.  Type 1 diabetes- diagnosed 11/2021.  This was induced by pembrolizumab.  She is currently undergoing chemotherapy with dexamethasone the night before and she has been experiencing hyperglycemia.  We reviewed the various insulins she is on and the mechanism of action. Advised patient to reach out with any changes to her chemo schedule or TPN.  We came up with the following plan (instructions given to patient):   Please increase glargine (lantus)  to 8 units (was 6 units).      Please increase regular insulin in TPN to 28 units (0.14 units/g dextrose).  This is increased from 26 units.    Please continue NPH insulin 7 units on Sunday nights with dexamethasone and 5 units AM of chemo (Mondays)    Please continue taking humalog correction every 4 hours if you are running high (follow correction scale below):    Daytime:   Do Not give Correction Insulin if Pre-Meal BG less than 140.   For Pre-Meal  - 189 give 1 unit.   For Pre-Meal  - 239 give 2 units.   For Pre-Meal  - 289 give 3 units.   For Pre-Meal  - 339 give 4 units.   For Pre-Meal - 399 give 5 units.   For Pre-Meal -449 give 6 units  For Pre-Meal BG greater than or equal to 450 give 7 units.      Bedtime:   Do Not give Bedtime Correction Insulin if BG less than 200.   For  - 249 give 1 units.   For  - 299 give 2  units.   For  - 349 give 3 units.   For  -399 give 4 units.   For BG greater than or equal to 400 give 5 units.    I have invited you to share david data with our clinic.  Please accept invitation so I can review your data if you have concerns.       Please send me an update in 1 week through [employer], sooner if you continue having glucose readings >250 or start having values less than 70 mg/dL.      I have refilled your medications, testing supplies, etc.  Please check ketones if glucose is high and you are feeling unwell (nausea, vomiting, etc).  Please notify the clinic if ketones are moderate or high.     Please reach out with any changes to TPN or chemo.    If you have concerns, please send me a [employer] message M-Th, call the clinic at [phone], or call [phone] after hours/weekends and ask to speak with the endocrinologist on call.        2.  F/U in 3 months or prn.  I am happy to adjust insulin remotely through [employer] as needed.  Plan discussed with pharmacist, patient and patient's .    69 minutes spent on the date of the encounter doing chart review, review of test results, review of continuous glucose sensor, interpretation of glucose data, patient visit and documentation, discussing plan with pharmacist, counseling/coordination of care, and discussion of follow up plan for worsening hyper and hypoglycemia.  The patient understood and is satisfied with today's visit.        Santa Mohan PA-C, MPAS   [facility]  Department of Medicine  Division of Endocrinology and Diabetes        Answers for HPI/ROS submitted by the patient on 1/6/2022  General Symptoms: Yes  Skin Symptoms: No  HENT Symptoms: No  EYE SYMPTOMS: No  HEART SYMPTOMS: No  LUNG SYMPTOMS: No  INTESTINAL SYMPTOMS: Yes  URINARY SYMPTOMS: No  GYNECOLOGIC SYMPTOMS: No  BREAST SYMPTOMS: No  SKELETAL SYMPTOMS: Yes  BLOOD SYMPTOMS: No  NERVOUS SYSTEM SYMPTOMS: No  MENTAL HEALTH SYMPTOMS: No  Fever: No  Loss of  appetite: Yes  Weight loss: No  Weight gain: No  Fatigue: Yes  Night sweats: No  Chills: No  Increased stress: Yes  Excessive hunger: No  Excessive thirst: No  Feeling hot or cold when others believe the temperature is normal: No  Loss of height: No  Post-operative complications: No  Surgical site pain: Yes  Hallucinations: No  Change in or Loss of Energy: Yes  Hyperactivity: No  Confusion: Yes  Heart burn or indigestion: No  Nausea: Yes  Vomiting: Yes  Abdominal pain: Yes  Bloating: Yes  Constipation: Yes  Diarrhea: No  Blood in stool: No  Black stools: No  Rectal or Anal pain: No  Fecal incontinence: No  Yellowing of skin or eyes: No  Vomit with blood: No  Change in stools: No  Back pain: Yes  Muscle aches: Yes  Neck pain: No  Swollen joints: No  Joint pain: No  Bone pain: No  Muscle cramps: No  Muscle weakness: Yes  Joint stiffness: No  Bone fracture: No        Shira is a 41 year old who is being evaluated via a billable video visit.      Patient reviewed medication and allergy lists on [employer] and states it all is correct.    How would you like to obtain your AVS? MyChart  If the video visit is dropped, the invitation should be resent by: Send to e-mail at: [email or web address]

## 2022-01-06 NOTE — PROGRESS NOTES
Shira is a 41 year old who is being evaluated via a billable video visit.      Patient reviewed medication and allergy lists on [employer] and states it all is correct.    How would you like to obtain your AVS? [employer]  If the video visit is dropped, the invitation should be resent by: Send to e-mail at: [email or web address]

## 2022-01-07 ENCOUNTER — MYC MEDICAL ADVICE (OUTPATIENT)
Dept: ENDOCRINOLOGY | Facility: CLINIC | Age: 42
End: 2022-01-07
Payer: COMMERCIAL

## 2022-01-10 ENCOUNTER — LAB REQUISITION (OUTPATIENT)
Dept: LAB | Facility: CLINIC | Age: 42
End: 2022-01-10
Payer: COMMERCIAL

## 2022-01-10 ENCOUNTER — TELEPHONE (OUTPATIENT)
Dept: ENDOCRINOLOGY | Facility: CLINIC | Age: 42
End: 2022-01-10

## 2022-01-10 ENCOUNTER — HOME INFUSION (PRE-WILLOW HOME INFUSION) (OUTPATIENT)
Dept: PHARMACY | Facility: CLINIC | Age: 42
End: 2022-01-10
Payer: COMMERCIAL

## 2022-01-10 DIAGNOSIS — E43 UNSPECIFIED SEVERE PROTEIN-CALORIE MALNUTRITION (H): ICD-10-CM

## 2022-01-10 LAB
ALBUMIN SERPL-MCNC: 2.2 G/DL (ref 3.4–5)
ALP SERPL-CCNC: 319 U/L (ref 40–150)
ALT SERPL W P-5'-P-CCNC: 147 U/L (ref 0–50)
ANION GAP SERPL CALCULATED.3IONS-SCNC: 3 MMOL/L (ref 3–14)
AST SERPL W P-5'-P-CCNC: 85 U/L (ref 0–45)
BASOPHILS # BLD MANUAL: 0 10E3/UL (ref 0–0.2)
BASOPHILS NFR BLD MANUAL: 0 %
BILIRUB DIRECT SERPL-MCNC: 0.2 MG/DL (ref 0–0.2)
BILIRUB SERPL-MCNC: 0.4 MG/DL (ref 0.2–1.3)
BILIRUB SERPL-MCNC: 0.4 MG/DL (ref 0.2–1.3)
BUN SERPL-MCNC: 18 MG/DL (ref 7–30)
CALCIUM SERPL-MCNC: 8.8 MG/DL (ref 8.5–10.1)
CHLORIDE BLD-SCNC: 99 MMOL/L (ref 94–109)
CO2 SERPL-SCNC: 31 MMOL/L (ref 20–32)
CREAT SERPL-MCNC: 0.4 MG/DL (ref 0.52–1.04)
EOSINOPHIL # BLD MANUAL: 0.2 10E3/UL (ref 0–0.7)
EOSINOPHIL NFR BLD MANUAL: 4 %
ERYTHROCYTE [DISTWIDTH] IN BLOOD BY AUTOMATED COUNT: 14.4 % (ref 10–15)
FASTING STATUS PATIENT QL REPORTED: NORMAL
GFR SERPL CREATININE-BSD FRML MDRD: >90 ML/MIN/1.73M2
GLUCOSE BLD-MCNC: 218 MG/DL (ref 70–99)
HCT VFR BLD AUTO: 24.9 % (ref 35–47)
HGB BLD-MCNC: 7.9 G/DL (ref 11.7–15.7)
LYMPHOCYTES # BLD MANUAL: 0.8 10E3/UL (ref 0.8–5.3)
LYMPHOCYTES NFR BLD MANUAL: 19 %
MAGNESIUM SERPL-MCNC: 2 MG/DL (ref 1.6–2.3)
MCH RBC QN AUTO: 28.1 PG (ref 26.5–33)
MCHC RBC AUTO-ENTMCNC: 31.7 G/DL (ref 31.5–36.5)
MCV RBC AUTO: 89 FL (ref 78–100)
MONOCYTES # BLD MANUAL: 0.9 10E3/UL (ref 0–1.3)
MONOCYTES NFR BLD MANUAL: 22 %
NEUTROPHILS # BLD MANUAL: 2.3 10E3/UL (ref 1.6–8.3)
NEUTROPHILS NFR BLD MANUAL: 55 %
PHOSPHATE SERPL-MCNC: 4.3 MG/DL (ref 2.5–4.5)
PLAT MORPH BLD: NORMAL
PLATELET # BLD AUTO: 485 10E3/UL (ref 150–450)
POTASSIUM BLD-SCNC: 4.6 MMOL/L (ref 3.4–5.3)
PROT SERPL-MCNC: 6.6 G/DL (ref 6.8–8.8)
RBC # BLD AUTO: 2.81 10E6/UL (ref 3.8–5.2)
RBC MORPH BLD: NORMAL
SODIUM SERPL-SCNC: 133 MMOL/L (ref 133–144)
TRIGL SERPL-MCNC: 74 MG/DL
WBC # BLD AUTO: 4.2 10E3/UL (ref 4–11)

## 2022-01-10 PROCEDURE — 80053 COMPREHEN METABOLIC PANEL: CPT | Performed by: OBSTETRICS & GYNECOLOGY

## 2022-01-10 PROCEDURE — 83735 ASSAY OF MAGNESIUM: CPT | Performed by: OBSTETRICS & GYNECOLOGY

## 2022-01-10 PROCEDURE — 84478 ASSAY OF TRIGLYCERIDES: CPT | Performed by: OBSTETRICS & GYNECOLOGY

## 2022-01-10 PROCEDURE — 84100 ASSAY OF PHOSPHORUS: CPT | Performed by: OBSTETRICS & GYNECOLOGY

## 2022-01-10 PROCEDURE — 85027 COMPLETE CBC AUTOMATED: CPT | Performed by: OBSTETRICS & GYNECOLOGY

## 2022-01-10 PROCEDURE — 82248 BILIRUBIN DIRECT: CPT | Performed by: OBSTETRICS & GYNECOLOGY

## 2022-01-10 NOTE — TELEPHONE ENCOUNTER
Called and spoke with PharmD, she did not know why a call was placed to us, as all pt's medications were approved and filled.  Nothing further.  Hema Torrez LPN on 1/10/2022 at 3:13 PM

## 2022-01-10 NOTE — TELEPHONE ENCOUNTER
[facility] Call Center    Phone Message    May a detailed message be left on voicemail: yes     Reason for Call: Other: [facility] pharmacy called regarding mutual patient, they states they need to speak with care team regarding medication changes.Please call [phone].     Action Taken: Message routed to:  [facility] Endo     Travel Screening: Not Applicable

## 2022-01-10 NOTE — TELEPHONE ENCOUNTER
Called pharm.  Message stated that pharmacist is on a break until 2pm.  Will try again.  Hema Torrez LPN on 1/10/2022 at 1:32 PM

## 2022-01-11 ENCOUNTER — HOME INFUSION (PRE-WILLOW HOME INFUSION) (OUTPATIENT)
Dept: PHARMACY | Facility: CLINIC | Age: 42
End: 2022-01-11
Payer: COMMERCIAL

## 2022-01-12 NOTE — TELEPHONE ENCOUNTER
I called Bandar to discuss recent glucose values.  He reports that Shira will not be going forward with any further treatments for cancer.    Santa Mohan

## 2022-01-13 ENCOUNTER — HOME INFUSION (PRE-WILLOW HOME INFUSION) (OUTPATIENT)
Dept: PHARMACY | Facility: CLINIC | Age: 42
End: 2022-01-13
Payer: COMMERCIAL

## 2022-01-14 ENCOUNTER — HOME INFUSION (PRE-WILLOW HOME INFUSION) (OUTPATIENT)
Dept: PHARMACY | Facility: CLINIC | Age: 42
End: 2022-01-14
Payer: COMMERCIAL

## 2022-01-14 ENCOUNTER — MEDICAL CORRESPONDENCE (OUTPATIENT)
Dept: HEALTH INFORMATION MANAGEMENT | Facility: CLINIC | Age: 42
End: 2022-01-14
Payer: COMMERCIAL

## 2022-01-16 ENCOUNTER — HOME INFUSION (PRE-WILLOW HOME INFUSION) (OUTPATIENT)
Dept: PHARMACY | Facility: CLINIC | Age: 42
End: 2022-01-16
Payer: COMMERCIAL

## 2022-01-25 NOTE — PROGRESS NOTES
This is a recent snapshot of the patient's [facility] medical record.  For current drug dose and complete information and questions, call [phone] or In Basket pool, fv home infusion ([id number])  CSN Number:  [id number]

## 2022-02-10 NOTE — PROGRESS NOTES
This is a recent snapshot of the patient's [facility] medical record.  For current drug dose and complete information and questions, call [phone] or In Basket pool, fv home infusion ([id number])  CSN Number:  [id number]

## 2022-02-18 NOTE — PROGRESS NOTES
This is a recent snapshot of the patient's [facility] medical record.  For current drug dose and complete information and questions, call [phone] or In Basket pool, fv home infusion ([id number])  CSN Number:  [id number]

## 2022-03-09 NOTE — PROGRESS NOTES
This is a recent snapshot of the patient's [facility] medical record.  For current drug dose and complete information and questions, call [phone] or In [facility] pool, fv home infusion ([id number])  CSN Number:  [id number]

## 2022-03-09 NOTE — PROGRESS NOTES
This is a recent snapshot of the patient's [facility] medical record.  For current drug dose and complete information and questions, call [phone] or In Basket pool, fv home infusion ([id number])  CSN Number:  [id number]

## 2022-03-10 NOTE — PROGRESS NOTES
This is a recent snapshot of the patient's [facility] medical record.  For current drug dose and complete information and questions, call [phone] or In Basket pool, fv home infusion ([id number])  CSN Number:  [id number]

## 2022-04-03 ENCOUNTER — HEALTH MAINTENANCE LETTER (OUTPATIENT)
Age: 42
End: 2022-04-03

## 2022-04-22 NOTE — PROGRESS NOTES
This is a recent snapshot of the patient's [facility] medical record.  For current drug dose and complete information and questions, call [phone] or In [facility] pool, fv home infusion ([id number])  CSN Number:  [id number]

## 2022-04-25 NOTE — PROGRESS NOTES
This is a recent snapshot of the patient's [facility] medical record.  For current drug dose and complete information and questions, call [phone] or In Basket pool, fv home infusion ([id number])  CSN Number:  [id number]

## 2022-04-28 NOTE — PROGRESS NOTES
This is a recent snapshot of the patient's [facility] medical record.  For current drug dose and complete information and questions, call [phone] or In Basket pool, fv home infusion ([id number])  CSN Number:  [id number]

## 2022-04-28 NOTE — PROGRESS NOTES
This is a recent snapshot of the patient's [facility] medical record.  For current drug dose and complete information and questions, call [phone] or In Basket pool, fv home infusion ([id number])  CSN Number:  [id number]

## 2022-04-29 NOTE — PROGRESS NOTES
This is a recent snapshot of the patient's [facility] medical record.  For current drug dose and complete information and questions, call [phone] or In Basket pool, fv home infusion ([id number])  CSN Number:  [id number]

## 2022-05-05 NOTE — PROGRESS NOTES
This is a recent snapshot of the patient's [facility] medical record.  For current drug dose and complete information and questions, call [phone] or In Basket pool, fv home infusion ([id number])  CSN Number:  [id number]

## 2022-05-10 NOTE — PROGRESS NOTES
This is a recent snapshot of the patient's [facility] medical record.  For current drug dose and complete information and questions, call [phone] or In Basket pool, fv home infusion ([id number])  CSN Number:  [id number]

## 2022-05-11 NOTE — PROGRESS NOTES
This is a recent snapshot of the patient's [facility] medical record.  For current drug dose and complete information and questions, call [phone] or In Basket pool, fv home infusion ([id number])  CSN Number:  [id number]

## 2022-05-19 NOTE — PROGRESS NOTES
This is a recent snapshot of the patient's [facility] medical record.  For current drug dose and complete information and questions, call [phone] or In Basket pool, fv home infusion ([id number])  CSN Number:  [id number]

## 2022-06-21 NOTE — PROGRESS NOTES
This is a recent snapshot of the patient's [facility] medical record.  For current drug dose and complete information and questions, call [phone] or In Basket pool, fv home infusion ([id number])  CSN Number:  [id number]

## 2022-07-24 ENCOUNTER — HEALTH MAINTENANCE LETTER (OUTPATIENT)
Age: 42
End: 2022-07-24

## 2022-10-03 ENCOUNTER — HEALTH MAINTENANCE LETTER (OUTPATIENT)
Age: 42
End: 2022-10-03

## 2023-02-11 ENCOUNTER — HEALTH MAINTENANCE LETTER (OUTPATIENT)
Age: 43
End: 2023-02-11

## 2023-04-03 PROBLEM — C78.7 MALIGNANT NEOPLASM METASTATIC TO LIVER (H): Status: ACTIVE | Noted: 2021-07-30

## 2023-04-03 PROBLEM — C78.6 MALIGNANT NEOPLASM METASTATIC TO PERITONEUM (H): Status: ACTIVE | Noted: 2021-07-30

## 2023-05-20 ENCOUNTER — HEALTH MAINTENANCE LETTER (OUTPATIENT)
Age: 43
End: 2023-05-20

## 2023-10-21 ENCOUNTER — HEALTH MAINTENANCE LETTER (OUTPATIENT)
Age: 43
End: 2023-10-21

## 2024-03-09 ENCOUNTER — HEALTH MAINTENANCE LETTER (OUTPATIENT)
Age: 44
End: 2024-03-09

## 2024-07-27 ENCOUNTER — HEALTH MAINTENANCE LETTER (OUTPATIENT)
Age: 44
End: 2024-07-27

## 2024-12-14 ENCOUNTER — HEALTH MAINTENANCE LETTER (OUTPATIENT)
Age: 44
End: 2024-12-14

## (undated) DEVICE — DRSG GAUZE 4X4" TRAY 6939

## (undated) DEVICE — KIT ENDO FIRST STEP DISINFECTANT 200ML W/POUCH EP-4

## (undated) DEVICE — INTRODUCER KIT GASTROSTOMY MIC G 22FR 98433

## (undated) DEVICE — DRAPE C-ARM W/STRAPS 42X72" 07-CA104

## (undated) DEVICE — DRAPE MAYO STAND 23X54 8337

## (undated) DEVICE — KIT PG 24FR SIL RADOPQ XTRN RTNT RING PUL MIC SECUR-LOK

## (undated) DEVICE — SYR 50ML CATH TIP W/O NDL 309620

## (undated) DEVICE — PREP CHLORAPREP 26ML TINTED ORANGE  260815

## (undated) DEVICE — DRSG DRAIN 2X2" 7087

## (undated) DEVICE — DEVICE SUTURE GRASPER TROCAR CLOSURE 14GA PMITCSG

## (undated) DEVICE — NDL COUNTER 20CT 31142493

## (undated) DEVICE — SOL WATER IRRIG 1000ML BOTTLE 2F7114

## (undated) DEVICE — LABEL MEDICATION SYSTEM 3303-P

## (undated) DEVICE — GLOVE PROTEXIS POWDER FREE SMT 8.0  2D72PT80X

## (undated) DEVICE — PACK ENDOSCOPY GI CUSTOM UMMC

## (undated) DEVICE — SUCTION MANIFOLD NEPTUNE 2 SYS 4 PORT 0702-020-000

## (undated) DEVICE — TUBE GASTROSTOMY PEG SET 24FR G22636 PEG-24-PULL-S

## (undated) DEVICE — ENDO TUBING CO2 SMARTCAP STERILE DISP 100145CO2EXT

## (undated) DEVICE — PAD CHUX UNDERPAD 23X24" 7136

## (undated) DEVICE — LINEN TOWEL PACK X5 5464

## (undated) DEVICE — INTR PEELAWAY 22FRX13CM G04096 PLVW-22.0-38

## (undated) DEVICE — DRSG-SPONGE DRAIN TRACH 4X4 6PLY ST NON256000

## (undated) DEVICE — ENDO BITE BLOCK ADULT OMNI-BLOC

## (undated) RX ORDER — HYDROMORPHONE HCL IN WATER/PF 6 MG/30 ML
PATIENT CONTROLLED ANALGESIA SYRINGE INTRAVENOUS
Status: DISPENSED
Start: 2021-12-15

## (undated) RX ORDER — ONDANSETRON 2 MG/ML
INJECTION INTRAMUSCULAR; INTRAVENOUS
Status: DISPENSED
Start: 2021-12-15

## (undated) RX ORDER — DEXAMETHASONE SODIUM PHOSPHATE 4 MG/ML
INJECTION, SOLUTION INTRA-ARTICULAR; INTRALESIONAL; INTRAMUSCULAR; INTRAVENOUS; SOFT TISSUE
Status: DISPENSED
Start: 2021-12-15

## (undated) RX ORDER — ROCURONIUM BROMIDE 50 MG/5 ML
SYRINGE (ML) INTRAVENOUS
Status: DISPENSED
Start: 2021-12-15

## (undated) RX ORDER — FENTANYL CITRATE 50 UG/ML
INJECTION, SOLUTION INTRAMUSCULAR; INTRAVENOUS
Status: DISPENSED
Start: 2021-12-15

## (undated) RX ORDER — LIDOCAINE HYDROCHLORIDE 20 MG/ML
INJECTION, SOLUTION EPIDURAL; INFILTRATION; INTRACAUDAL; PERINEURAL
Status: DISPENSED
Start: 2021-12-15

## (undated) RX ORDER — CEFAZOLIN SODIUM 1 G/3ML
INJECTION, POWDER, FOR SOLUTION INTRAMUSCULAR; INTRAVENOUS
Status: DISPENSED
Start: 2021-12-15

## (undated) RX ORDER — EPHEDRINE SULFATE 50 MG/ML
INJECTION, SOLUTION INTRAMUSCULAR; INTRAVENOUS; SUBCUTANEOUS
Status: DISPENSED
Start: 2021-12-15

## (undated) RX ORDER — FENTANYL CITRATE-0.9 % NACL/PF 10 MCG/ML
PLASTIC BAG, INJECTION (ML) INTRAVENOUS
Status: DISPENSED
Start: 2021-12-15

## (undated) RX ORDER — GLYCOPYRROLATE 0.2 MG/ML
INJECTION, SOLUTION INTRAMUSCULAR; INTRAVENOUS
Status: DISPENSED
Start: 2021-12-15